# Patient Record
Sex: MALE | Race: WHITE | NOT HISPANIC OR LATINO
[De-identification: names, ages, dates, MRNs, and addresses within clinical notes are randomized per-mention and may not be internally consistent; named-entity substitution may affect disease eponyms.]

---

## 2017-10-30 ENCOUNTER — APPOINTMENT (OUTPATIENT)
Dept: UROLOGY | Facility: CLINIC | Age: 82
End: 2017-10-30

## 2017-11-29 ENCOUNTER — APPOINTMENT (OUTPATIENT)
Dept: ULTRASOUND IMAGING | Facility: IMAGING CENTER | Age: 82
End: 2017-11-29

## 2017-11-29 ENCOUNTER — APPOINTMENT (OUTPATIENT)
Dept: UROLOGY | Facility: CLINIC | Age: 82
End: 2017-11-29

## 2018-01-24 ENCOUNTER — APPOINTMENT (OUTPATIENT)
Dept: UROLOGY | Facility: CLINIC | Age: 83
End: 2018-01-24

## 2018-05-19 ENCOUNTER — INPATIENT (INPATIENT)
Facility: HOSPITAL | Age: 83
LOS: 15 days | Discharge: HOME | DRG: 239 | End: 2018-06-04
Attending: SURGERY | Admitting: SURGERY
Payer: MEDICARE

## 2018-05-19 ENCOUNTER — EMERGENCY (EMERGENCY)
Facility: HOSPITAL | Age: 83
LOS: 1 days | Discharge: ANOTHER HOSPITAL | End: 2018-05-19
Attending: EMERGENCY MEDICINE
Payer: MEDICARE

## 2018-05-19 VITALS
DIASTOLIC BLOOD PRESSURE: 103 MMHG | OXYGEN SATURATION: 99 % | SYSTOLIC BLOOD PRESSURE: 225 MMHG | RESPIRATION RATE: 19 BRPM | TEMPERATURE: 98 F | HEART RATE: 105 BPM

## 2018-05-19 VITALS
WEIGHT: 197.98 LBS | SYSTOLIC BLOOD PRESSURE: 145 MMHG | HEIGHT: 71 IN | OXYGEN SATURATION: 96 % | DIASTOLIC BLOOD PRESSURE: 64 MMHG | HEART RATE: 122 BPM | RESPIRATION RATE: 20 BRPM

## 2018-05-19 VITALS
SYSTOLIC BLOOD PRESSURE: 212 MMHG | OXYGEN SATURATION: 100 % | DIASTOLIC BLOOD PRESSURE: 102 MMHG | WEIGHT: 197.98 LBS | HEART RATE: 103 BPM

## 2018-05-19 DIAGNOSIS — Z98.89 OTHER SPECIFIED POSTPROCEDURAL STATES: Chronic | ICD-10-CM

## 2018-05-19 DIAGNOSIS — Z98.49 CATARACT EXTRACTION STATUS, UNSPECIFIED EYE: Chronic | ICD-10-CM

## 2018-05-19 LAB
ALBUMIN SERPL ELPH-MCNC: 2.3 G/DL — LOW (ref 3.3–5)
ALP SERPL-CCNC: 117 U/L — SIGNIFICANT CHANGE UP (ref 40–120)
ALT FLD-CCNC: 31 U/L — SIGNIFICANT CHANGE UP (ref 12–78)
ANION GAP SERPL CALC-SCNC: 13 MMOL/L — SIGNIFICANT CHANGE UP (ref 5–17)
APTT BLD: 39.3 SEC — HIGH (ref 27.5–37.4)
AST SERPL-CCNC: 22 U/L — SIGNIFICANT CHANGE UP (ref 15–37)
BASOPHILS # BLD AUTO: 0.2 K/UL — SIGNIFICANT CHANGE UP (ref 0–0.2)
BASOPHILS NFR BLD AUTO: 1.8 % — SIGNIFICANT CHANGE UP (ref 0–2)
BILIRUB SERPL-MCNC: 0.3 MG/DL — SIGNIFICANT CHANGE UP (ref 0.2–1.2)
BLD GP AB SCN SERPL QL: SIGNIFICANT CHANGE UP
BUN SERPL-MCNC: 11 MG/DL — SIGNIFICANT CHANGE UP (ref 7–23)
CALCIUM SERPL-MCNC: 5.9 MG/DL — CRITICAL LOW (ref 8.5–10.1)
CHLORIDE SERPL-SCNC: 120 MMOL/L — HIGH (ref 96–108)
CO2 SERPL-SCNC: 17 MMOL/L — LOW (ref 22–31)
CREAT SERPL-MCNC: 0.76 MG/DL — SIGNIFICANT CHANGE UP (ref 0.5–1.3)
EOSINOPHIL # BLD AUTO: 0.2 K/UL — SIGNIFICANT CHANGE UP (ref 0–0.5)
EOSINOPHIL NFR BLD AUTO: 2.4 % — SIGNIFICANT CHANGE UP (ref 0–6)
GLUCOSE SERPL-MCNC: 117 MG/DL — HIGH (ref 70–99)
HCT VFR BLD CALC: 47.2 % — SIGNIFICANT CHANGE UP (ref 39–50)
HGB BLD-MCNC: 15.8 G/DL — SIGNIFICANT CHANGE UP (ref 13–17)
INR BLD: 4.25 RATIO — HIGH (ref 0.88–1.16)
LACTATE SERPL-SCNC: 2.6 MMOL/L — HIGH (ref 0.7–2)
LYMPHOCYTES # BLD AUTO: 2.8 K/UL — SIGNIFICANT CHANGE UP (ref 1–3.3)
LYMPHOCYTES # BLD AUTO: 30.4 % — SIGNIFICANT CHANGE UP (ref 13–44)
MCHC RBC-ENTMCNC: 32.5 PG — SIGNIFICANT CHANGE UP (ref 27–34)
MCHC RBC-ENTMCNC: 33.5 GM/DL — SIGNIFICANT CHANGE UP (ref 32–36)
MCV RBC AUTO: 96.9 FL — SIGNIFICANT CHANGE UP (ref 80–100)
MONOCYTES # BLD AUTO: 0.6 K/UL — SIGNIFICANT CHANGE UP (ref 0–0.9)
MONOCYTES NFR BLD AUTO: 6.7 % — SIGNIFICANT CHANGE UP (ref 1–9)
NEUTROPHILS # BLD AUTO: 5.5 K/UL — SIGNIFICANT CHANGE UP (ref 1.8–7.4)
NEUTROPHILS NFR BLD AUTO: 58.8 % — SIGNIFICANT CHANGE UP (ref 43–77)
PLATELET # BLD AUTO: 165 K/UL — SIGNIFICANT CHANGE UP (ref 150–400)
POTASSIUM SERPL-MCNC: 2.5 MMOL/L — CRITICAL LOW (ref 3.5–5.3)
POTASSIUM SERPL-SCNC: 2.5 MMOL/L — CRITICAL LOW (ref 3.5–5.3)
PROT SERPL-MCNC: 4.8 G/DL — LOW (ref 6–8.3)
PROTHROM AB SERPL-ACNC: 47.7 SEC — HIGH (ref 9.8–12.7)
RBC # BLD: 4.87 M/UL — SIGNIFICANT CHANGE UP (ref 4.2–5.8)
RBC # FLD: 12.4 % — SIGNIFICANT CHANGE UP (ref 10.3–14.5)
SODIUM SERPL-SCNC: 150 MMOL/L — HIGH (ref 135–145)
WBC # BLD: 9.4 K/UL — SIGNIFICANT CHANGE UP (ref 3.8–10.5)
WBC # FLD AUTO: 9.4 K/UL — SIGNIFICANT CHANGE UP (ref 3.8–10.5)

## 2018-05-19 PROCEDURE — 99285 EMERGENCY DEPT VISIT HI MDM: CPT

## 2018-05-19 PROCEDURE — 71045 X-RAY EXAM CHEST 1 VIEW: CPT

## 2018-05-19 PROCEDURE — 96374 THER/PROPH/DIAG INJ IV PUSH: CPT

## 2018-05-19 PROCEDURE — 71045 X-RAY EXAM CHEST 1 VIEW: CPT | Mod: 26

## 2018-05-19 PROCEDURE — 83605 ASSAY OF LACTIC ACID: CPT

## 2018-05-19 PROCEDURE — 85610 PROTHROMBIN TIME: CPT

## 2018-05-19 PROCEDURE — 86850 RBC ANTIBODY SCREEN: CPT

## 2018-05-19 PROCEDURE — 85730 THROMBOPLASTIN TIME PARTIAL: CPT

## 2018-05-19 PROCEDURE — 80053 COMPREHEN METABOLIC PANEL: CPT

## 2018-05-19 PROCEDURE — 85027 COMPLETE CBC AUTOMATED: CPT

## 2018-05-19 PROCEDURE — 99285 EMERGENCY DEPT VISIT HI MDM: CPT | Mod: 25

## 2018-05-19 PROCEDURE — 96375 TX/PRO/DX INJ NEW DRUG ADDON: CPT

## 2018-05-19 PROCEDURE — 86901 BLOOD TYPING SEROLOGIC RH(D): CPT

## 2018-05-19 PROCEDURE — 86900 BLOOD TYPING SEROLOGIC ABO: CPT

## 2018-05-19 PROCEDURE — 93005 ELECTROCARDIOGRAM TRACING: CPT

## 2018-05-19 PROCEDURE — 99284 EMERGENCY DEPT VISIT MOD MDM: CPT

## 2018-05-19 RX ORDER — NICARDIPINE HYDROCHLORIDE 30 MG/1
1 CAPSULE, EXTENDED RELEASE ORAL
Qty: 40 | Refills: 0 | Status: DISCONTINUED | OUTPATIENT
Start: 2018-05-19 | End: 2018-05-23

## 2018-05-19 RX ORDER — MORPHINE SULFATE 50 MG/1
4 CAPSULE, EXTENDED RELEASE ORAL ONCE
Qty: 0 | Refills: 0 | Status: DISCONTINUED | OUTPATIENT
Start: 2018-05-19 | End: 2018-05-19

## 2018-05-19 RX ORDER — HYDROMORPHONE HYDROCHLORIDE 2 MG/ML
1 INJECTION INTRAMUSCULAR; INTRAVENOUS; SUBCUTANEOUS ONCE
Qty: 0 | Refills: 0 | Status: DISCONTINUED | OUTPATIENT
Start: 2018-05-19 | End: 2018-05-19

## 2018-05-19 RX ADMIN — NICARDIPINE HYDROCHLORIDE 5 MG/HR: 30 CAPSULE, EXTENDED RELEASE ORAL at 23:02

## 2018-05-19 RX ADMIN — HYDROMORPHONE HYDROCHLORIDE 1 MILLIGRAM(S): 2 INJECTION INTRAMUSCULAR; INTRAVENOUS; SUBCUTANEOUS at 22:55

## 2018-05-19 RX ADMIN — MORPHINE SULFATE 4 MILLIGRAM(S): 50 CAPSULE, EXTENDED RELEASE ORAL at 22:00

## 2018-05-19 RX ADMIN — MORPHINE SULFATE 4 MILLIGRAM(S): 50 CAPSULE, EXTENDED RELEASE ORAL at 22:45

## 2018-05-19 NOTE — ED PROVIDER NOTE - NEUROLOGICAL, MLM
Alert and oriented, no focal deficits, no motor or sensory deficits. Alert and oriented, no focal deficits; Decreased strength and sensation to LLE.

## 2018-05-19 NOTE — ED PROVIDER NOTE - MEDICAL DECISION MAKING DETAILS
pt with acute ischemic foot, hx of aneurysms, pain meds, vasc consult, check labs, bp control, transfer vasc [place] pt with acute ischemic foot, hx of aneurysms, pain meds, vasc consult, check labs, bp control, transfer vasc [place],  bp control

## 2018-05-19 NOTE — ED PROVIDER NOTE - OBJECTIVE STATEMENT
88M with h/o AAA , recent LLE DVT , started on Lovenox last Sunday, now on coumadin; brought in by family for acute onset of LLE pain since 10 PM tonight. Pt presented to [place] ED with cold, pulseless, pale LLE and transferred to [facility] ED for evaluation by vascular and general surgery.  No known history of Afib.  Pt denies any other acute complaints. 88M with h/o AAA , recent LLE DVT , started on Lovenox last Sunday, now on coumadin; brought in by family for acute onset of LLE pain since 10 PM tonight. Pt presented to [place] ED with cold, pulseless, pale LLE and transferred to [facility] ED for evaluation by vascular and general surgery.  No known history of Afib.  Pt denies any other acute complaints. Per [place] ED attending, Dr. Sandoval and Lisandra made aware. Pt arrived on cardene gtt for HTN and received morphine and dilaudid prior to transfer. 88M with h/o Alzheimers, HTN,  AAA , recent LLE DVT , started on Lovenox last Sunday, now on coumadin; brought in by family for acute onset of LLE pain since 10 PM tonight. Pt presented to [place] ED with cold, pulseless, pale LLE and transferred to [facility] ED for evaluation by vascular and general surgery.  No known history of Afib.  Pt denies any other acute complaints. Per [place] ED attending, Dr. Sandoval and Lisandra made aware. Pt arrived on cardene gtt for HTN and received morphine and dilaudid prior to transfer.    PMD: Dr. 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 Pt presented to [place] ED with cold, pulseless, pale LLE and transferred to [facility] ED for evaluation by vascular and general surgery.  No known history of Afib.  Pt denies any other acute complaints. Per [place] ED attending, Dr. Sandoval and Lisandra made aware. Pt arrived on cardene gtt for HTN and received morphine and dilaudid prior to transfer.    PMD: Dr. Rojas

## 2018-05-19 NOTE — ED PROVIDER NOTE - PHYSICAL EXAMINATION
Pale, cool LLE from knee down to foot  No dopplerable DP/ PT pulses.  Strong 2 + L femoral pulse Pale, cool LLE from knee down to foot  No dopplerable DP/ PT pulses.  Strong 2 + L femoral pulse  Decreased strength and sensation to LLE.

## 2018-05-19 NOTE — ED ADULT TRIAGE NOTE - CHIEF COMPLAINT QUOTE
pt a+ox3,  transfer from [place], c.o left lower leg pain. left lower leg pulseless, blue and cold to touch. pt reports numbness to left foot and lower leg. pt reports increase in pain to left lower leg. pt transferred on cardene infusion, infusion paused at this time by EMS, state stopped medication because of BP in route to hospital. pt rec'd morphine en route to ED. Accepting ED MD @ bedside for eval.

## 2018-05-19 NOTE — ED PROVIDER NOTE - OBJECTIVE STATEMENT
Pt is a 89 yo male with hx 5cm aaa, right femoral art aneurysm, recent left leg dvt startd on coumading. pt about 2 hoiurs ago with sudden left foot pain, numnbess and palor.  pt denies cp, back pain, sob, trauma.  per family no fall.  pt denies hx of afib

## 2018-05-19 NOTE — ED PROVIDER NOTE - PHYSICAL EXAMINATION
no c/e,    left foot with pallor over distal 2/3 with no dp or pt pulse, cold to touch, no cyanosis, motor weak, + numbness, pt with warmth and color normal in calf above ankle , +popliteal pulse b/l  right foot wnl, 2+ pulses    all other pulses wnl

## 2018-05-19 NOTE — ED PROVIDER NOTE - FAMILY HISTORY
Father  Still living? No  Family history of heart disease, Age at diagnosis: Age Unknown     Sibling  Still living? No  Family history of Alzheimer's disease, Age at diagnosis: Age Unknown

## 2018-05-19 NOTE — ED PROVIDER NOTE - SKIN, MLM
Skin normal color for race, warm, dry and intact. No evidence of rash. Pale LLE from slightly above knee and downward.

## 2018-05-19 NOTE — ED PROVIDER NOTE - MEDICAL DECISION MAKING DETAILS
Pt transferred from [place] ED with pulseless LLE; h/o popliteal aneurysm and new LLE DVT found 1 week ago - plan to repeat blood work; consult vascular and general surgery;

## 2018-05-19 NOTE — ED PROVIDER NOTE - MUSCULOSKELETAL, MLM
Spine appears normal, range of motion is not limited, no muscle or joint tenderness Spine appears normal, painful ROM of L knee

## 2018-05-19 NOTE — ED ADULT NURSE NOTE - OBJECTIVE STATEMENT
Pt. received alert/awake and confused w/ chief complaint of left lower extremity pain for 1 day. Pt. presents w/ blue left lower extremity, w/ numbness and pulseless. Pt. also states pain to posterior left knee. Pt. placed on continuous cardiac monitor with continuous pulse ox. EKG performed at bedside upon arrival.

## 2018-05-19 NOTE — ED PROVIDER NOTE - PMH
AAA (abdominal aortic aneurysm)  5cm  Right iliac aneurysm 4cm  Alzheimer disease    Cataract    Gallstones    Prostate ca

## 2018-05-19 NOTE — ED ADULT NURSE NOTE - PMH
AAA (abdominal aortic aneurysm)  5cm  Right iliac aneurysm 4cm  Alzheimer disease    Cataract    DVT (deep venous thrombosis)    Gallstones    Prostate ca

## 2018-05-19 NOTE — ED PROVIDER NOTE - PROGRESS NOTE DETAILS
dw [place] transfer center,  accepted by dr zelaya or emergency surg,  will tx for by but requests no other meds

## 2018-05-19 NOTE — ED PROVIDER NOTE - CONSTITUTIONAL, MLM
normal... chronically ill, well nourished, awake, alert, oriented to person, place, time/situation and in pain

## 2018-05-19 NOTE — ED PROVIDER NOTE - PROGRESS NOTE DETAILS
paging general surgery and vascular surgery. Awaiting call back. Vascular surgery PA now at bedside. Pt now at CT; radiology tech's accepting creatining from [facility] Pt now at CT; radiology tech's accepting creatinine from PV.  WIll hold heparin at this time per vascular attending recs.

## 2018-05-20 ENCOUNTER — TRANSCRIPTION ENCOUNTER (OUTPATIENT)
Age: 83
End: 2018-05-20

## 2018-05-20 DIAGNOSIS — I70.90 UNSPECIFIED ATHEROSCLEROSIS: ICD-10-CM

## 2018-05-20 DIAGNOSIS — Z01.810 ENCOUNTER FOR PREPROCEDURAL CARDIOVASCULAR EXAMINATION: ICD-10-CM

## 2018-05-20 DIAGNOSIS — I82.502 CHRONIC EMBOLISM AND THROMBOSIS OF UNSPECIFIED DEEP VEINS OF LEFT LOWER EXTREMITY: ICD-10-CM

## 2018-05-20 DIAGNOSIS — Z98.49 CATARACT EXTRACTION STATUS, UNSPECIFIED EYE: Chronic | ICD-10-CM

## 2018-05-20 DIAGNOSIS — Z90.79 ACQUIRED ABSENCE OF OTHER GENITAL ORGAN(S): Chronic | ICD-10-CM

## 2018-05-20 DIAGNOSIS — Z90.49 ACQUIRED ABSENCE OF OTHER SPECIFIED PARTS OF DIGESTIVE TRACT: Chronic | ICD-10-CM

## 2018-05-20 DIAGNOSIS — I99.8 OTHER DISORDER OF CIRCULATORY SYSTEM: ICD-10-CM

## 2018-05-20 LAB
ALBUMIN SERPL ELPH-MCNC: 3.7 G/DL — SIGNIFICANT CHANGE UP (ref 3.3–5.2)
ALP SERPL-CCNC: 164 U/L — HIGH (ref 40–120)
ALT FLD-CCNC: 39 U/L — SIGNIFICANT CHANGE UP
ANION GAP SERPL CALC-SCNC: 16 MMOL/L — SIGNIFICANT CHANGE UP (ref 5–17)
ANION GAP SERPL CALC-SCNC: 16 MMOL/L — SIGNIFICANT CHANGE UP (ref 5–17)
ANION GAP SERPL CALC-SCNC: 19 MMOL/L — HIGH (ref 5–17)
APTT BLD: 105.4 SEC — HIGH (ref 27.5–37.4)
APTT BLD: 49.6 SEC — HIGH (ref 27.5–37.4)
APTT BLD: 93.7 SEC — HIGH (ref 27.5–37.4)
APTT BLD: 95.6 SEC — HIGH (ref 27.5–37.4)
APTT BLD: >200 SEC — CRITICAL HIGH (ref 27.5–37.4)
AST SERPL-CCNC: 34 U/L — SIGNIFICANT CHANGE UP
BASE EXCESS BLDV CALC-SCNC: -4.1 MMOL/L — LOW (ref -2–2)
BASOPHILS # BLD AUTO: 0 K/UL — SIGNIFICANT CHANGE UP (ref 0–0.2)
BASOPHILS # BLD AUTO: 0 K/UL — SIGNIFICANT CHANGE UP (ref 0–0.2)
BASOPHILS NFR BLD AUTO: 0.2 % — SIGNIFICANT CHANGE UP (ref 0–2)
BASOPHILS NFR BLD AUTO: 0.3 % — SIGNIFICANT CHANGE UP (ref 0–2)
BILIRUB SERPL-MCNC: 0.8 MG/DL — SIGNIFICANT CHANGE UP (ref 0.4–2)
BLD GP AB SCN SERPL QL: SIGNIFICANT CHANGE UP
BUN SERPL-MCNC: 13 MG/DL — SIGNIFICANT CHANGE UP (ref 8–20)
BUN SERPL-MCNC: 13 MG/DL — SIGNIFICANT CHANGE UP (ref 8–20)
BUN SERPL-MCNC: 15 MG/DL — SIGNIFICANT CHANGE UP (ref 8–20)
CA-I SERPL-SCNC: 1.08 MMOL/L — LOW (ref 1.15–1.33)
CALCIUM SERPL-MCNC: 8.4 MG/DL — LOW (ref 8.6–10.2)
CALCIUM SERPL-MCNC: 8.5 MG/DL — LOW (ref 8.6–10.2)
CALCIUM SERPL-MCNC: 9 MG/DL — SIGNIFICANT CHANGE UP (ref 8.6–10.2)
CHLORIDE BLDV-SCNC: 111 MMOL/L — HIGH (ref 98–107)
CHLORIDE SERPL-SCNC: 101 MMOL/L — SIGNIFICANT CHANGE UP (ref 98–107)
CHLORIDE SERPL-SCNC: 103 MMOL/L — SIGNIFICANT CHANGE UP (ref 98–107)
CHLORIDE SERPL-SCNC: 104 MMOL/L — SIGNIFICANT CHANGE UP (ref 98–107)
CO2 SERPL-SCNC: 19 MMOL/L — LOW (ref 22–29)
CO2 SERPL-SCNC: 23 MMOL/L — SIGNIFICANT CHANGE UP (ref 22–29)
CO2 SERPL-SCNC: 23 MMOL/L — SIGNIFICANT CHANGE UP (ref 22–29)
CREAT SERPL-MCNC: 0.89 MG/DL — SIGNIFICANT CHANGE UP (ref 0.5–1.3)
CREAT SERPL-MCNC: 0.98 MG/DL — SIGNIFICANT CHANGE UP (ref 0.5–1.3)
CREAT SERPL-MCNC: 1.16 MG/DL — SIGNIFICANT CHANGE UP (ref 0.5–1.3)
EOSINOPHIL # BLD AUTO: 0 K/UL — SIGNIFICANT CHANGE UP (ref 0–0.5)
EOSINOPHIL # BLD AUTO: 0 K/UL — SIGNIFICANT CHANGE UP (ref 0–0.5)
EOSINOPHIL NFR BLD AUTO: 0.1 % — SIGNIFICANT CHANGE UP (ref 0–5)
EOSINOPHIL NFR BLD AUTO: 0.2 % — SIGNIFICANT CHANGE UP (ref 0–5)
GAS PNL BLDA: SIGNIFICANT CHANGE UP
GAS PNL BLDV: 145 MMOL/L — SIGNIFICANT CHANGE UP (ref 135–145)
GAS PNL BLDV: SIGNIFICANT CHANGE UP
GAS PNL BLDV: SIGNIFICANT CHANGE UP
GLUCOSE BLDC GLUCOMTR-MCNC: 114 MG/DL — HIGH (ref 70–99)
GLUCOSE BLDC GLUCOMTR-MCNC: 131 MG/DL — HIGH (ref 70–99)
GLUCOSE BLDC GLUCOMTR-MCNC: 137 MG/DL — HIGH (ref 70–99)
GLUCOSE BLDV-MCNC: 211 MG/DL — HIGH (ref 70–99)
GLUCOSE SERPL-MCNC: 145 MG/DL — HIGH (ref 70–115)
GLUCOSE SERPL-MCNC: 156 MG/DL — HIGH (ref 70–115)
GLUCOSE SERPL-MCNC: 219 MG/DL — HIGH (ref 70–115)
HCO3 BLDV-SCNC: 21 MMOL/L — SIGNIFICANT CHANGE UP (ref 21–29)
HCT VFR BLD CALC: 47.6 % — SIGNIFICANT CHANGE UP (ref 42–52)
HCT VFR BLD CALC: 48.9 % — SIGNIFICANT CHANGE UP (ref 42–52)
HGB BLD-MCNC: 15.9 G/DL — SIGNIFICANT CHANGE UP (ref 14–18)
HGB BLD-MCNC: 16.5 G/DL — SIGNIFICANT CHANGE UP (ref 14–18)
INR BLD: 1.51 RATIO — HIGH (ref 0.88–1.16)
INR BLD: 4.71 RATIO — HIGH (ref 0.88–1.16)
LACTATE BLDV-MCNC: 4.2 MMOL/L — CRITICAL HIGH (ref 0.5–2)
LACTATE BLDV-MCNC: 4.2 MMOL/L — CRITICAL HIGH (ref 0.5–2)
LYMPHOCYTES # BLD AUTO: 1 K/UL — SIGNIFICANT CHANGE UP (ref 1–4.8)
LYMPHOCYTES # BLD AUTO: 1.2 K/UL — SIGNIFICANT CHANGE UP (ref 1–4.8)
LYMPHOCYTES # BLD AUTO: 12.5 % — LOW (ref 20–55)
LYMPHOCYTES # BLD AUTO: 6.7 % — LOW (ref 20–55)
MAGNESIUM SERPL-MCNC: 2.6 MG/DL — SIGNIFICANT CHANGE UP (ref 1.6–2.6)
MCHC RBC-ENTMCNC: 32 PG — HIGH (ref 27–31)
MCHC RBC-ENTMCNC: 32.9 PG — HIGH (ref 27–31)
MCHC RBC-ENTMCNC: 33.4 G/DL — SIGNIFICANT CHANGE UP (ref 32–36)
MCHC RBC-ENTMCNC: 33.7 G/DL — SIGNIFICANT CHANGE UP (ref 32–36)
MCV RBC AUTO: 95.8 FL — HIGH (ref 80–94)
MCV RBC AUTO: 97.6 FL — HIGH (ref 80–94)
MONOCYTES # BLD AUTO: 1.1 K/UL — HIGH (ref 0–0.8)
MONOCYTES # BLD AUTO: 1.2 K/UL — HIGH (ref 0–0.8)
MONOCYTES NFR BLD AUTO: 12.7 % — HIGH (ref 3–10)
MONOCYTES NFR BLD AUTO: 7.5 % — SIGNIFICANT CHANGE UP (ref 3–10)
NEUTROPHILS # BLD AUTO: 13 K/UL — HIGH (ref 1.8–8)
NEUTROPHILS # BLD AUTO: 6.8 K/UL — SIGNIFICANT CHANGE UP (ref 1.8–8)
NEUTROPHILS NFR BLD AUTO: 74.2 % — HIGH (ref 37–73)
NEUTROPHILS NFR BLD AUTO: 85.1 % — HIGH (ref 37–73)
PCO2 BLDV: 36 MMHG — SIGNIFICANT CHANGE UP (ref 35–50)
PH BLDV: 7.37 — SIGNIFICANT CHANGE UP (ref 7.32–7.43)
PHOSPHATE SERPL-MCNC: 4.6 MG/DL — SIGNIFICANT CHANGE UP (ref 2.4–4.7)
PLATELET # BLD AUTO: 143 K/UL — LOW (ref 150–400)
PLATELET # BLD AUTO: 158 K/UL — SIGNIFICANT CHANGE UP (ref 150–400)
PO2 BLDV: 199 MMHG — HIGH (ref 25–45)
POTASSIUM BLDV-SCNC: 3.5 MMOL/L — SIGNIFICANT CHANGE UP (ref 3.4–4.5)
POTASSIUM SERPL-MCNC: 3.6 MMOL/L — SIGNIFICANT CHANGE UP (ref 3.5–5.3)
POTASSIUM SERPL-MCNC: 4.6 MMOL/L — SIGNIFICANT CHANGE UP (ref 3.5–5.3)
POTASSIUM SERPL-MCNC: 5.7 MMOL/L — HIGH (ref 3.5–5.3)
POTASSIUM SERPL-SCNC: 3.6 MMOL/L — SIGNIFICANT CHANGE UP (ref 3.5–5.3)
POTASSIUM SERPL-SCNC: 4.6 MMOL/L — SIGNIFICANT CHANGE UP (ref 3.5–5.3)
POTASSIUM SERPL-SCNC: 5.7 MMOL/L — HIGH (ref 3.5–5.3)
PROT SERPL-MCNC: 7.3 G/DL — SIGNIFICANT CHANGE UP (ref 6.6–8.7)
PROTHROM AB SERPL-ACNC: 16.8 SEC — HIGH (ref 9.8–12.7)
PROTHROM AB SERPL-ACNC: 53.5 SEC — HIGH (ref 9.8–12.7)
RBC # BLD: 4.97 M/UL — SIGNIFICANT CHANGE UP (ref 4.6–6.2)
RBC # BLD: 5.01 M/UL — SIGNIFICANT CHANGE UP (ref 4.6–6.2)
RBC # FLD: 13.6 % — SIGNIFICANT CHANGE UP (ref 11–15.6)
RBC # FLD: 13.8 % — SIGNIFICANT CHANGE UP (ref 11–15.6)
SAO2 % BLDV: 100 % — SIGNIFICANT CHANGE UP
SODIUM SERPL-SCNC: 140 MMOL/L — SIGNIFICANT CHANGE UP (ref 135–145)
SODIUM SERPL-SCNC: 142 MMOL/L — SIGNIFICANT CHANGE UP (ref 135–145)
SODIUM SERPL-SCNC: 142 MMOL/L — SIGNIFICANT CHANGE UP (ref 135–145)
TSH SERPL-MCNC: 2 UIU/ML — SIGNIFICANT CHANGE UP (ref 0.27–4.2)
TSH SERPL-MCNC: 4.35 UIU/ML — HIGH (ref 0.27–4.2)
TYPE + AB SCN PNL BLD: SIGNIFICANT CHANGE UP
WBC # BLD: 15.3 K/UL — HIGH (ref 4.8–10.8)
WBC # BLD: 9.2 K/UL — SIGNIFICANT CHANGE UP (ref 4.8–10.8)
WBC # FLD AUTO: 15.3 K/UL — HIGH (ref 4.8–10.8)
WBC # FLD AUTO: 9.2 K/UL — SIGNIFICANT CHANGE UP (ref 4.8–10.8)

## 2018-05-20 PROCEDURE — 71045 X-RAY EXAM CHEST 1 VIEW: CPT | Mod: 26

## 2018-05-20 PROCEDURE — 75635 CT ANGIO ABDOMINAL ARTERIES: CPT | Mod: 26

## 2018-05-20 PROCEDURE — 99223 1ST HOSP IP/OBS HIGH 75: CPT

## 2018-05-20 PROCEDURE — 93010 ELECTROCARDIOGRAM REPORT: CPT

## 2018-05-20 RX ORDER — CALCIUM GLUCONATE 100 MG/ML
2 VIAL (ML) INTRAVENOUS ONCE
Qty: 0 | Refills: 0 | Status: COMPLETED | OUTPATIENT
Start: 2018-05-20 | End: 2018-05-20

## 2018-05-20 RX ORDER — HYDROMORPHONE HYDROCHLORIDE 2 MG/ML
1 INJECTION INTRAMUSCULAR; INTRAVENOUS; SUBCUTANEOUS
Qty: 0 | Refills: 0 | Status: DISCONTINUED | OUTPATIENT
Start: 2018-05-20 | End: 2018-05-21

## 2018-05-20 RX ORDER — SODIUM CHLORIDE 9 MG/ML
1000 INJECTION, SOLUTION INTRAVENOUS
Qty: 0 | Refills: 0 | Status: DISCONTINUED | OUTPATIENT
Start: 2018-05-20 | End: 2018-05-20

## 2018-05-20 RX ORDER — HEPARIN SODIUM 5000 [USP'U]/ML
3500 INJECTION INTRAVENOUS; SUBCUTANEOUS EVERY 6 HOURS
Qty: 0 | Refills: 0 | Status: DISCONTINUED | OUTPATIENT
Start: 2018-05-20 | End: 2018-05-20

## 2018-05-20 RX ORDER — CEFAZOLIN SODIUM 1 G
2000 VIAL (EA) INJECTION ONCE
Qty: 0 | Refills: 0 | Status: DISCONTINUED | OUTPATIENT
Start: 2018-05-20 | End: 2018-05-20

## 2018-05-20 RX ORDER — PHYTONADIONE (VIT K1) 5 MG
10 TABLET ORAL ONCE
Qty: 0 | Refills: 0 | Status: COMPLETED | OUTPATIENT
Start: 2018-05-20 | End: 2018-05-20

## 2018-05-20 RX ORDER — ACETAMINOPHEN 500 MG
1000 TABLET ORAL ONCE
Qty: 0 | Refills: 0 | Status: COMPLETED | OUTPATIENT
Start: 2018-05-20 | End: 2018-05-20

## 2018-05-20 RX ORDER — HYDROMORPHONE HYDROCHLORIDE 2 MG/ML
0.5 INJECTION INTRAMUSCULAR; INTRAVENOUS; SUBCUTANEOUS ONCE
Qty: 0 | Refills: 0 | Status: DISCONTINUED | OUTPATIENT
Start: 2018-05-20 | End: 2018-05-20

## 2018-05-20 RX ORDER — MAGNESIUM SULFATE 500 MG/ML
2 VIAL (ML) INJECTION ONCE
Qty: 0 | Refills: 0 | Status: COMPLETED | OUTPATIENT
Start: 2018-05-20 | End: 2018-05-20

## 2018-05-20 RX ORDER — ASPIRIN/CALCIUM CARB/MAGNESIUM 324 MG
81 TABLET ORAL DAILY
Qty: 0 | Refills: 0 | Status: DISCONTINUED | OUTPATIENT
Start: 2018-05-20 | End: 2018-05-21

## 2018-05-20 RX ORDER — POTASSIUM PHOSPHATE, MONOBASIC POTASSIUM PHOSPHATE, DIBASIC 236; 224 MG/ML; MG/ML
30 INJECTION, SOLUTION INTRAVENOUS ONCE
Qty: 0 | Refills: 0 | Status: COMPLETED | OUTPATIENT
Start: 2018-05-20 | End: 2018-05-20

## 2018-05-20 RX ORDER — SODIUM CHLORIDE 9 MG/ML
1000 INJECTION INTRAMUSCULAR; INTRAVENOUS; SUBCUTANEOUS ONCE
Qty: 0 | Refills: 0 | Status: COMPLETED | OUTPATIENT
Start: 2018-05-20 | End: 2018-05-20

## 2018-05-20 RX ORDER — POTASSIUM CHLORIDE 20 MEQ
10 PACKET (EA) ORAL ONCE
Qty: 0 | Refills: 0 | Status: COMPLETED | OUTPATIENT
Start: 2018-05-20 | End: 2018-05-20

## 2018-05-20 RX ORDER — PANTOPRAZOLE SODIUM 20 MG/1
40 TABLET, DELAYED RELEASE ORAL
Qty: 0 | Refills: 0 | Status: DISCONTINUED | OUTPATIENT
Start: 2018-05-20 | End: 2018-05-21

## 2018-05-20 RX ORDER — DONEPEZIL HYDROCHLORIDE 10 MG/1
10 TABLET, FILM COATED ORAL AT BEDTIME
Qty: 0 | Refills: 0 | Status: DISCONTINUED | OUTPATIENT
Start: 2018-05-20 | End: 2018-05-21

## 2018-05-20 RX ORDER — LISINOPRIL 2.5 MG/1
5 TABLET ORAL DAILY
Qty: 0 | Refills: 0 | Status: DISCONTINUED | OUTPATIENT
Start: 2018-05-20 | End: 2018-05-21

## 2018-05-20 RX ORDER — ATORVASTATIN CALCIUM 80 MG/1
10 TABLET, FILM COATED ORAL AT BEDTIME
Qty: 0 | Refills: 0 | Status: DISCONTINUED | OUTPATIENT
Start: 2018-05-20 | End: 2018-05-21

## 2018-05-20 RX ORDER — HEPARIN SODIUM 5000 [USP'U]/ML
1200 INJECTION INTRAVENOUS; SUBCUTANEOUS
Qty: 25000 | Refills: 0 | Status: DISCONTINUED | OUTPATIENT
Start: 2018-05-20 | End: 2018-05-21

## 2018-05-20 RX ORDER — HEPARIN SODIUM 5000 [USP'U]/ML
7000 INJECTION INTRAVENOUS; SUBCUTANEOUS EVERY 6 HOURS
Qty: 0 | Refills: 0 | Status: DISCONTINUED | OUTPATIENT
Start: 2018-05-20 | End: 2018-05-20

## 2018-05-20 RX ORDER — GABAPENTIN 400 MG/1
300 CAPSULE ORAL THREE TIMES A DAY
Qty: 0 | Refills: 0 | Status: DISCONTINUED | OUTPATIENT
Start: 2018-05-20 | End: 2018-05-21

## 2018-05-20 RX ORDER — HEPARIN SODIUM 5000 [USP'U]/ML
INJECTION INTRAVENOUS; SUBCUTANEOUS
Qty: 25000 | Refills: 0 | Status: DISCONTINUED | OUTPATIENT
Start: 2018-05-20 | End: 2018-05-20

## 2018-05-20 RX ORDER — CEFAZOLIN SODIUM 1 G
2000 VIAL (EA) INJECTION ONCE
Qty: 0 | Refills: 0 | Status: DISCONTINUED | OUTPATIENT
Start: 2018-05-21 | End: 2018-05-21

## 2018-05-20 RX ORDER — HYDROMORPHONE HYDROCHLORIDE 2 MG/ML
0.5 INJECTION INTRAMUSCULAR; INTRAVENOUS; SUBCUTANEOUS
Qty: 0 | Refills: 0 | Status: DISCONTINUED | OUTPATIENT
Start: 2018-05-20 | End: 2018-05-21

## 2018-05-20 RX ADMIN — Medication 50 GRAM(S): at 04:35

## 2018-05-20 RX ADMIN — DONEPEZIL HYDROCHLORIDE 10 MILLIGRAM(S): 10 TABLET, FILM COATED ORAL at 21:24

## 2018-05-20 RX ADMIN — HEPARIN SODIUM 1000 UNIT(S)/HR: 5000 INJECTION INTRAVENOUS; SUBCUTANEOUS at 23:46

## 2018-05-20 RX ADMIN — GABAPENTIN 300 MILLIGRAM(S): 400 CAPSULE ORAL at 05:40

## 2018-05-20 RX ADMIN — PANTOPRAZOLE SODIUM 40 MILLIGRAM(S): 20 TABLET, DELAYED RELEASE ORAL at 06:19

## 2018-05-20 RX ADMIN — HEPARIN SODIUM 1000 UNIT(S)/HR: 5000 INJECTION INTRAVENOUS; SUBCUTANEOUS at 17:26

## 2018-05-20 RX ADMIN — Medication 200 GRAM(S): at 14:56

## 2018-05-20 RX ADMIN — GABAPENTIN 300 MILLIGRAM(S): 400 CAPSULE ORAL at 21:24

## 2018-05-20 RX ADMIN — POTASSIUM PHOSPHATE, MONOBASIC POTASSIUM PHOSPHATE, DIBASIC 83.33 MILLIMOLE(S): 236; 224 INJECTION, SOLUTION INTRAVENOUS at 09:30

## 2018-05-20 RX ADMIN — Medication 1000 MILLIGRAM(S): at 04:00

## 2018-05-20 RX ADMIN — GABAPENTIN 300 MILLIGRAM(S): 400 CAPSULE ORAL at 14:58

## 2018-05-20 RX ADMIN — HEPARIN SODIUM 1200 UNIT(S)/HR: 5000 INJECTION INTRAVENOUS; SUBCUTANEOUS at 05:03

## 2018-05-20 RX ADMIN — SODIUM CHLORIDE 1000 MILLILITER(S): 9 INJECTION INTRAMUSCULAR; INTRAVENOUS; SUBCUTANEOUS at 01:50

## 2018-05-20 RX ADMIN — HYDROMORPHONE HYDROCHLORIDE 0.5 MILLIGRAM(S): 2 INJECTION INTRAMUSCULAR; INTRAVENOUS; SUBCUTANEOUS at 03:57

## 2018-05-20 RX ADMIN — ATORVASTATIN CALCIUM 10 MILLIGRAM(S): 80 TABLET, FILM COATED ORAL at 21:24

## 2018-05-20 RX ADMIN — Medication 102 MILLIGRAM(S): at 02:02

## 2018-05-20 RX ADMIN — Medication 400 MILLIGRAM(S): at 03:57

## 2018-05-20 RX ADMIN — HYDROMORPHONE HYDROCHLORIDE 0.5 MILLIGRAM(S): 2 INJECTION INTRAMUSCULAR; INTRAVENOUS; SUBCUTANEOUS at 04:55

## 2018-05-20 RX ADMIN — Medication 81 MILLIGRAM(S): at 12:14

## 2018-05-20 RX ADMIN — HYDROMORPHONE HYDROCHLORIDE 0.5 MILLIGRAM(S): 2 INJECTION INTRAMUSCULAR; INTRAVENOUS; SUBCUTANEOUS at 02:02

## 2018-05-20 RX ADMIN — SODIUM CHLORIDE 100 MILLILITER(S): 9 INJECTION, SOLUTION INTRAVENOUS at 03:55

## 2018-05-20 RX ADMIN — LISINOPRIL 5 MILLIGRAM(S): 2.5 TABLET ORAL at 06:19

## 2018-05-20 RX ADMIN — Medication 100 MILLIEQUIVALENT(S): at 10:04

## 2018-05-20 RX ADMIN — HYDROMORPHONE HYDROCHLORIDE 0.5 MILLIGRAM(S): 2 INJECTION INTRAMUSCULAR; INTRAVENOUS; SUBCUTANEOUS at 04:35

## 2018-05-20 RX ADMIN — POTASSIUM PHOSPHATE, MONOBASIC POTASSIUM PHOSPHATE, DIBASIC 83.33 MILLIMOLE(S): 236; 224 INJECTION, SOLUTION INTRAVENOUS at 04:43

## 2018-05-20 NOTE — H&P ADULT - NSHPPHYSICALEXAM_GEN_ALL_CORE
VSS, NAD, A&O   CVS: S1, S2  Chest: BS BL  Abd: soft, NT, ecchymotic   LLE: pale, insensate, without motor function, cool to touch from foot to knee extending into thigh, palpable popliteal fullness, pulses non-palpable throughout exremity except for 2+ femoral pulse  RLE: NVI , warm

## 2018-05-20 NOTE — PROGRESS NOTE ADULT - SUBJECTIVE AND OBJECTIVE BOX
PRE OPERATIVE NOTE    Pre-op Diagnosis: Critical limb ischemia  Procedure: Left AKA  Surgeon: Dr Fry    PAST MEDICAL & SURGICAL HISTORY:  DVT (deep venous thrombosis)  Cataract of both eyes  Prostate CA  AAA (abdominal aortic aneurysm)  Alzheimer disease  Hypertension  DVT (deep venous thrombosis)  AAA (abdominal aortic aneurysm): 5cm  Right iliac aneurysm 4cm  Alzheimer disease  Cataract  Prostate ca  Gallstones  History of cataract surgery  H/O prostatectomy  History of cholecystectomy  H/O prostatectomy:   Status post cataract surgery: bilateral  History of cholecystectomy:     Allergies  No Known Allergies    Daily Height in cm: 180.34 (19 May 2018 23:36)    Daily Weight in k.3 (20 May 2018 03:30)    Vital Signs Last 24 Hrs  T(C): 36.4 (20 May 2018 15:59), Max: 37.2 (20 May 2018 05:00)  T(F): 97.5 (20 May 2018 15:59), Max: 99 (20 May 2018 05:00)  HR: 86 (20 May 2018 16:00) (79 - 124)  BP: 171/81 (20 May 2018 16:00) (145/64 - 225/103)  BP(mean): 117 (20 May 2018 16:00) (100 - 130)  RR: 15 (20 May 2018 16:00) (11 - 20)  SpO2: 97% (20 May 2018 16:00) (95% - 100%)    MEDICATIONS  (STANDING):  aspirin  chewable 81 milliGRAM(s) Oral daily  atorvastatin 10 milliGRAM(s) Oral at bedtime  donepezil 10 milliGRAM(s) Oral at bedtime  gabapentin 300 milliGRAM(s) Oral three times a day  heparin  Infusion. 1200 Unit(s)/Hr (12 mL/Hr) IV Continuous <Continuous>  lisinopril 5 milliGRAM(s) Oral daily  pantoprazole    Tablet 40 milliGRAM(s) Oral before breakfast    MEDICATIONS  (PRN):  HYDROmorphone  Injectable 0.5 milliGRAM(s) IV Push every 3 hours PRN Moderate Pain (4 - 6)  HYDROmorphone  Injectable 1 milliGRAM(s) IV Push every 3 hours PRN Severe Pain (7 - 10)                            15.9   9.2   )-----------( 143      ( 20 May 2018 09:44 )             47.6     05-20    142  |  103  |  13.0  ----------------------------<  156<H>  4.6   |  23.0  |  0.98    Ca    8.5<L>      20 May 2018 15:47  Phos  4.6       Mg     2.6         TPro  7.3  /  Alb  3.7  /  TBili  0.8  /  DBili  x   /  AST  34  /  ALT  39  /  AlkPhos  164<H>      PT/INR - ( 20 May 2018 09:44 )   PT: 16.8 sec;   INR: 1.51 ratio    PTT - ( 20 May 2018 15:47 )  PTT:>200.0 sec    CAPILLARY BLOOD GLUCOSE  POCT Blood Glucose.: 114 mg/dL (20 May 2018 11:51)    Type & Screen: on chart  CXR: on chart  EKG: on chart    A/P: 88y Male planned for above procedure  Consent to be obtained by MD in periop holding area  NPO past midnight, except medications  lisinopril  Hold heparin on call to OR  Blood on hold, 4 Units  Periop antibiotics ordered

## 2018-05-20 NOTE — ED ADULT NURSE NOTE - OBJECTIVE STATEMENT
Pt A&Ox4 c/o LLE pain and numbness at this time, was a transfer from [place] for Surgery consult. LLE is dusky in color cool to touch, absent pulse and sentation. Pt resting comfortably, VSS, no signs of distress at this time, CM in place, safety maintained, call bell in reach.

## 2018-05-20 NOTE — H&P ADULT - PMH
AAA (abdominal aortic aneurysm)    Alzheimer disease    Cataract of both eyes    DVT (deep venous thrombosis)    Hypertension    Prostate CA

## 2018-05-20 NOTE — H&P ADULT - HISTORY OF PRESENT ILLNESS
88 year old male w/PMH of Alzheimers, prostate ca s/p prostatectomy transferred here from [place] with pulselessness, pain and numbness to E that developed around 9pm . Patients family reports a recent diagnosis of DVT to [facility] and started Lovenox / coumadin therapy about 10 days ago with a recent INR of 2.3 a few days ago, however INR - 4.2 on repeat labs here . Lactate uptrending from 2.6 to 4.7 . Pt comes in on cardene gtt for SBP > 200 .     Pts family report no history of vascular intervention .   Pt complains of pain despite medication, loss of sensation and ability to move leg.  Pt normally ambulates independently at home.

## 2018-05-20 NOTE — ED ADULT NURSE NOTE - CHIEF COMPLAINT QUOTE
pt a+ox3,  transfer from [place], c.o left lower leg pain. left lower leg pulseless, blue and cold to touch. pt reports numbness to left foot and lower leg. pt reports increase in pain to left lower leg. pt transferred on cardene infusion, infusion paused at this time by EMS, state stopped medication because of BP in route to hospital. pt rec'd morphine en route to ED. Accepting ED MD @ bedside for eval.

## 2018-05-20 NOTE — CONSULT NOTE ADULT - SUBJECTIVE AND OBJECTIVE BOX
[facility]                                                        Office: [address] [address]                                                       Telephone: [phone]. Fax:[phone]      CC: leg pain    HPI: 88 year old male w/PMH of Alzheimers, prostate ca s/p prostatectomy transferred here from [place] with pulselessness, pain and numbness to [facility] that developed around 9pm . Patients family reports a recent diagnosis of DVT to [facility] and started Lovenox / coumadin therapy about 10 days ago with a recent INR of 2.3 a few days ago, however INR - 4.2 on repeat labs here . Lactate uptrending from 2.6 to 4.7 . Pt comes in on cardene gtt for SBP > 200 .     Unclear cardiac history. Patient unable to provide any specifics. No chest pain at this time.     PAST MEDICAL & SURGICAL HISTORY:  DVT (deep venous thrombosis)  Cataract of both eyes  Prostate CA  AAA (abdominal aortic aneurysm)  Alzheimer disease  Hypertension  DVT (deep venous thrombosis)  AAA (abdominal aortic aneurysm): 5cm  Right iliac aneurysm 4cm  Alzheimer disease  Cataract  Prostate ca  Gallstones  History of cataract surgery  H/O prostatectomy  History of cholecystectomy  H/O prostatectomy: 1998  Status post cataract surgery: bilateral  History of cholecystectomy: 1997    FAMILY HISTORY:  No pertinent family history in first degree relatives  Family history of Alzheimer's disease (Sibling)  Family history of heart disease (Father)    SOCIAL HISTORY: no EtOH, drugs or tobacco    MEDICATIONS  (STANDING):  aspirin  chewable 81 milliGRAM(s) Oral daily  atorvastatin 10 milliGRAM(s) Oral at bedtime  calcium gluconate IVPB 2 Gram(s) IV Intermittent once  donepezil 10 milliGRAM(s) Oral at bedtime  gabapentin 300 milliGRAM(s) Oral three times a day  heparin  Infusion. 1200 Unit(s)/Hr (12 mL/Hr) IV Continuous <Continuous>  lisinopril 5 milliGRAM(s) Oral daily  multiple electrolytes Injection Type 1 1000 milliLiter(s) (100 mL/Hr) IV Continuous <Continuous>  pantoprazole    Tablet 40 milliGRAM(s) Oral before breakfast    ROS: All others negative    PHYSICAL EXAM:  Vital Signs Last 24 Hrs  T(C): 37.2 (20 May 2018 12:10), Max: 37.2 (20 May 2018 05:00)  T(F): 98.9 (20 May 2018 12:10), Max: 99 (20 May 2018 05:00)  HR: 87 (20 May 2018 10:00) (87 - 124)  BP: 151/70 (20 May 2018 10:00) (145/64 - 225/103)  BP(mean): 100 (20 May 2018 10:00) (100 - 130)  RR: 19 (20 May 2018 10:00) (11 - 20)  SpO2: 95% (20 May 2018 10:00) (95% - 100%)  I&O's Summary    19 May 2018 07:01  -  20 May 2018 07:00  --------------------------------------------------------  IN: 1394.6 mL / OUT: 0 mL / NET: 1394.6 mL    20 May 2018 07:01  -  20 May 2018 12:33  --------------------------------------------------------  IN: 456 mL / OUT: 825 mL / NET: -369 mL      Appearance: Normal	  HEENT:   Normal oral mucosa, PERRL, EOMI	  Lymphatic: No lymphadenopathy  Cardiovascular: Normal S1 S2, No JVD, No murmurs, No edema  Respiratory: Lungs clear to auscultation	  Psychiatry: A & O x 3, Mood & affect appropriate  Gastrointestinal:  Soft, Non-tender, + BS	  Skin: No rashes, No ecchymoses, No cyanosis  Neurologic: unable to move left lower extremity.   Extremities: Left lower extremity is cold with no pulse.    ECG: Sinus with PACs and PVCs.   LABS:                        15.9   9.2   )-----------( 143      ( 20 May 2018 09:44 )             47.6     05-20    140  |  101  |  13.0  ----------------------------<  145<H>  5.7<H>   |  23.0  |  0.89    Ca    8.4<L>      20 May 2018 09:44  Phos  4.6     05-20  Mg     2.6     05-20    TPro  7.3  /  Alb  3.7  /  TBili  0.8  /  DBili  x   /  AST  34  /  ALT  39  /  AlkPhos  164<H>  05-20    PT/INR - ( 20 May 2018 09:44 )   PT: 16.8 sec;   INR: 1.51 ratio         PTT - ( 20 May 2018 09:44 )  PTT:93.7 sec  CARDIAC MARKERS ( 20 May 2018 00:20 )  x     / x     / 209 U/L / x     / 3.1 ng/mL      RADIOLOGY & ADDITIONAL STUDIES: Thrombosed pop aneurysm.

## 2018-05-20 NOTE — H&P ADULT - PROBLEM SELECTOR PLAN 1
-Vitamin K to reverse coumadin / INR of 4.7  -2units FFP    -Start heparin gtt @ 1000 units , no bolus to prevent further clot  -Admit to SICU for continued vascular checks   -Family and Dr. Fry agree to plan for AKA on Monday 5/21   -Echo ordered   -Home meds resumed   -Dilaudid prn for analgesia

## 2018-05-20 NOTE — H&P ADULT - NSHPLABSRESULTS_GEN_ALL_CORE
CTA - L ext iliac open, CFA , profunda patent with narrowing of SFA and complete occlusion distal segment of SFA, no distal flow to extremity. popliteal aneurysm

## 2018-05-20 NOTE — PATIENT PROFILE ADULT. - VISION (WITH CORRECTIVE LENSES IF THE PATIENT USUALLY WEARS THEM):
Normal vision: sees adequately in most situations; can see medication labels, newsprint/Prescription Glassess

## 2018-05-20 NOTE — PROGRESS NOTE ADULT - SUBJECTIVE AND OBJECTIVE BOX
\Spoke to pt and family iin great detail about the findings  Left legt cold to knee no motor no sensation no pusle occluded pop aneurysm  Recommended AKA which will be done monday. Family all understand and agree to the preocedure  Cardio work up will be done pre op

## 2018-05-21 ENCOUNTER — RESULT REVIEW (OUTPATIENT)
Age: 83
End: 2018-05-21

## 2018-05-21 LAB
ANION GAP SERPL CALC-SCNC: 11 MMOL/L — SIGNIFICANT CHANGE UP (ref 5–17)
ANION GAP SERPL CALC-SCNC: 13 MMOL/L — SIGNIFICANT CHANGE UP (ref 5–17)
APTT BLD: 74.1 SEC — HIGH (ref 27.5–37.4)
BASOPHILS # BLD AUTO: 0 K/UL — SIGNIFICANT CHANGE UP (ref 0–0.2)
BASOPHILS NFR BLD AUTO: 0.5 % — SIGNIFICANT CHANGE UP (ref 0–2)
BUN SERPL-MCNC: 16 MG/DL — SIGNIFICANT CHANGE UP (ref 8–20)
BUN SERPL-MCNC: 17 MG/DL — SIGNIFICANT CHANGE UP (ref 8–20)
CALCIUM SERPL-MCNC: 7.7 MG/DL — LOW (ref 8.6–10.2)
CALCIUM SERPL-MCNC: 8.3 MG/DL — LOW (ref 8.6–10.2)
CHLORIDE SERPL-SCNC: 106 MMOL/L — SIGNIFICANT CHANGE UP (ref 98–107)
CHLORIDE SERPL-SCNC: 107 MMOL/L — SIGNIFICANT CHANGE UP (ref 98–107)
CK MB CFR SERPL CALC: 147.2 NG/ML — HIGH (ref 0–6.7)
CK MB CFR SERPL CALC: 67.5 NG/ML — HIGH (ref 0–6.7)
CK SERPL-CCNC: CRITICAL HIGH U/L (ref 30–200)
CK SERPL-CCNC: CRITICAL HIGH U/L (ref 30–200)
CO2 SERPL-SCNC: 22 MMOL/L — SIGNIFICANT CHANGE UP (ref 22–29)
CO2 SERPL-SCNC: 24 MMOL/L — SIGNIFICANT CHANGE UP (ref 22–29)
CREAT SERPL-MCNC: 1.09 MG/DL — SIGNIFICANT CHANGE UP (ref 0.5–1.3)
CREAT SERPL-MCNC: 1.11 MG/DL — SIGNIFICANT CHANGE UP (ref 0.5–1.3)
EOSINOPHIL # BLD AUTO: 0 K/UL — SIGNIFICANT CHANGE UP (ref 0–0.5)
EOSINOPHIL NFR BLD AUTO: 0.5 % — SIGNIFICANT CHANGE UP (ref 0–5)
GLUCOSE BLDC GLUCOMTR-MCNC: 131 MG/DL — HIGH (ref 70–99)
GLUCOSE SERPL-MCNC: 132 MG/DL — HIGH (ref 70–115)
GLUCOSE SERPL-MCNC: 136 MG/DL — HIGH (ref 70–115)
HCT VFR BLD CALC: 38.7 % — LOW (ref 42–52)
HCT VFR BLD CALC: 47.8 % — SIGNIFICANT CHANGE UP (ref 42–52)
HGB BLD-MCNC: 12.5 G/DL — LOW (ref 14–18)
HGB BLD-MCNC: 15.4 G/DL — SIGNIFICANT CHANGE UP (ref 14–18)
INR BLD: 1.11 RATIO — SIGNIFICANT CHANGE UP (ref 0.88–1.16)
LYMPHOCYTES # BLD AUTO: 1.7 K/UL — SIGNIFICANT CHANGE UP (ref 1–4.8)
LYMPHOCYTES # BLD AUTO: 18 % — LOW (ref 20–55)
MAGNESIUM SERPL-MCNC: 2.1 MG/DL — SIGNIFICANT CHANGE UP (ref 1.6–2.6)
MAGNESIUM SERPL-MCNC: 2.3 MG/DL — SIGNIFICANT CHANGE UP (ref 1.6–2.6)
MCHC RBC-ENTMCNC: 31.6 PG — HIGH (ref 27–31)
MCHC RBC-ENTMCNC: 32.2 G/DL — SIGNIFICANT CHANGE UP (ref 32–36)
MCHC RBC-ENTMCNC: 32.3 G/DL — SIGNIFICANT CHANGE UP (ref 32–36)
MCHC RBC-ENTMCNC: 32.3 PG — HIGH (ref 27–31)
MCV RBC AUTO: 100 FL — HIGH (ref 80–94)
MCV RBC AUTO: 98 FL — HIGH (ref 80–94)
MONOCYTES # BLD AUTO: 1.2 K/UL — HIGH (ref 0–0.8)
MONOCYTES NFR BLD AUTO: 12.2 % — HIGH (ref 3–10)
NEUTROPHILS # BLD AUTO: 6.4 K/UL — SIGNIFICANT CHANGE UP (ref 1.8–8)
NEUTROPHILS NFR BLD AUTO: 68.6 % — SIGNIFICANT CHANGE UP (ref 37–73)
PHOSPHATE SERPL-MCNC: 2.6 MG/DL — SIGNIFICANT CHANGE UP (ref 2.4–4.7)
PHOSPHATE SERPL-MCNC: 3.6 MG/DL — SIGNIFICANT CHANGE UP (ref 2.4–4.7)
PLATELET # BLD AUTO: 131 K/UL — LOW (ref 150–400)
PLATELET # BLD AUTO: 137 K/UL — LOW (ref 150–400)
POTASSIUM SERPL-MCNC: 4.1 MMOL/L — SIGNIFICANT CHANGE UP (ref 3.5–5.3)
POTASSIUM SERPL-MCNC: 4.8 MMOL/L — SIGNIFICANT CHANGE UP (ref 3.5–5.3)
POTASSIUM SERPL-SCNC: 4.1 MMOL/L — SIGNIFICANT CHANGE UP (ref 3.5–5.3)
POTASSIUM SERPL-SCNC: 4.8 MMOL/L — SIGNIFICANT CHANGE UP (ref 3.5–5.3)
PROTHROM AB SERPL-ACNC: 12.2 SEC — SIGNIFICANT CHANGE UP (ref 9.8–12.7)
RBC # BLD: 3.87 M/UL — LOW (ref 4.6–6.2)
RBC # BLD: 4.88 M/UL — SIGNIFICANT CHANGE UP (ref 4.6–6.2)
RBC # FLD: 14.1 % — SIGNIFICANT CHANGE UP (ref 11–15.6)
RBC # FLD: 14.3 % — SIGNIFICANT CHANGE UP (ref 11–15.6)
SODIUM SERPL-SCNC: 141 MMOL/L — SIGNIFICANT CHANGE UP (ref 135–145)
SODIUM SERPL-SCNC: 142 MMOL/L — SIGNIFICANT CHANGE UP (ref 135–145)
WBC # BLD: 8.7 K/UL — SIGNIFICANT CHANGE UP (ref 4.8–10.8)
WBC # BLD: 9.4 K/UL — SIGNIFICANT CHANGE UP (ref 4.8–10.8)
WBC # FLD AUTO: 8.7 K/UL — SIGNIFICANT CHANGE UP (ref 4.8–10.8)
WBC # FLD AUTO: 9.4 K/UL — SIGNIFICANT CHANGE UP (ref 4.8–10.8)

## 2018-05-21 PROCEDURE — 88311 DECALCIFY TISSUE: CPT | Mod: 26

## 2018-05-21 PROCEDURE — 88307 TISSUE EXAM BY PATHOLOGIST: CPT | Mod: 26

## 2018-05-21 PROCEDURE — 93010 ELECTROCARDIOGRAM REPORT: CPT

## 2018-05-21 PROCEDURE — 99291 CRITICAL CARE FIRST HOUR: CPT

## 2018-05-21 PROCEDURE — 99222 1ST HOSP IP/OBS MODERATE 55: CPT

## 2018-05-21 RX ORDER — FENTANYL CITRATE 50 UG/ML
25 INJECTION INTRAVENOUS
Qty: 0 | Refills: 0 | Status: DISCONTINUED | OUTPATIENT
Start: 2018-05-21 | End: 2018-05-21

## 2018-05-21 RX ORDER — SODIUM CHLORIDE 9 MG/ML
1000 INJECTION, SOLUTION INTRAVENOUS ONCE
Qty: 0 | Refills: 0 | Status: COMPLETED | OUTPATIENT
Start: 2018-05-21 | End: 2018-05-21

## 2018-05-21 RX ORDER — SODIUM CHLORIDE 9 MG/ML
500 INJECTION, SOLUTION INTRAVENOUS ONCE
Qty: 0 | Refills: 0 | Status: DISCONTINUED | OUTPATIENT
Start: 2018-05-21 | End: 2018-05-21

## 2018-05-21 RX ORDER — SENNA PLUS 8.6 MG/1
2 TABLET ORAL AT BEDTIME
Qty: 0 | Refills: 0 | Status: DISCONTINUED | OUTPATIENT
Start: 2018-05-21 | End: 2018-06-04

## 2018-05-21 RX ORDER — ASPIRIN/CALCIUM CARB/MAGNESIUM 324 MG
81 TABLET ORAL DAILY
Qty: 0 | Refills: 0 | Status: DISCONTINUED | OUTPATIENT
Start: 2018-05-21 | End: 2018-06-04

## 2018-05-21 RX ORDER — GABAPENTIN 400 MG/1
300 CAPSULE ORAL THREE TIMES A DAY
Qty: 0 | Refills: 0 | Status: DISCONTINUED | OUTPATIENT
Start: 2018-05-21 | End: 2018-06-04

## 2018-05-21 RX ORDER — PANTOPRAZOLE SODIUM 20 MG/1
40 TABLET, DELAYED RELEASE ORAL
Qty: 0 | Refills: 0 | Status: DISCONTINUED | OUTPATIENT
Start: 2018-05-21 | End: 2018-06-04

## 2018-05-21 RX ORDER — LANOLIN ALCOHOL/MO/W.PET/CERES
5 CREAM (GRAM) TOPICAL AT BEDTIME
Qty: 0 | Refills: 0 | Status: DISCONTINUED | OUTPATIENT
Start: 2018-05-21 | End: 2018-05-21

## 2018-05-21 RX ORDER — DOCUSATE SODIUM 100 MG
100 CAPSULE ORAL THREE TIMES A DAY
Qty: 0 | Refills: 0 | Status: DISCONTINUED | OUTPATIENT
Start: 2018-05-21 | End: 2018-06-04

## 2018-05-21 RX ORDER — GABAPENTIN 400 MG/1
300 CAPSULE ORAL
Qty: 0 | Refills: 0 | Status: DISCONTINUED | OUTPATIENT
Start: 2018-05-21 | End: 2018-05-21

## 2018-05-21 RX ORDER — SODIUM CHLORIDE 9 MG/ML
1000 INJECTION, SOLUTION INTRAVENOUS
Qty: 0 | Refills: 0 | Status: DISCONTINUED | OUTPATIENT
Start: 2018-05-21 | End: 2018-05-22

## 2018-05-21 RX ORDER — LANOLIN ALCOHOL/MO/W.PET/CERES
5 CREAM (GRAM) TOPICAL AT BEDTIME
Qty: 0 | Refills: 0 | Status: DISCONTINUED | OUTPATIENT
Start: 2018-05-21 | End: 2018-06-04

## 2018-05-21 RX ORDER — DONEPEZIL HYDROCHLORIDE 10 MG/1
10 TABLET, FILM COATED ORAL AT BEDTIME
Qty: 0 | Refills: 0 | Status: DISCONTINUED | OUTPATIENT
Start: 2018-05-21 | End: 2018-06-04

## 2018-05-21 RX ORDER — ATORVASTATIN CALCIUM 80 MG/1
10 TABLET, FILM COATED ORAL AT BEDTIME
Qty: 0 | Refills: 0 | Status: DISCONTINUED | OUTPATIENT
Start: 2018-05-21 | End: 2018-06-04

## 2018-05-21 RX ORDER — SODIUM CHLORIDE 9 MG/ML
1000 INJECTION, SOLUTION INTRAVENOUS
Qty: 0 | Refills: 0 | Status: DISCONTINUED | OUTPATIENT
Start: 2018-05-21 | End: 2018-05-21

## 2018-05-21 RX ORDER — FENTANYL CITRATE 50 UG/ML
50 INJECTION INTRAVENOUS
Qty: 0 | Refills: 0 | Status: DISCONTINUED | OUTPATIENT
Start: 2018-05-21 | End: 2018-05-21

## 2018-05-21 RX ORDER — HYDROMORPHONE HYDROCHLORIDE 2 MG/ML
0.4 INJECTION INTRAMUSCULAR; INTRAVENOUS; SUBCUTANEOUS
Qty: 0 | Refills: 0 | Status: DISCONTINUED | OUTPATIENT
Start: 2018-05-21 | End: 2018-05-21

## 2018-05-21 RX ORDER — ONDANSETRON 8 MG/1
4 TABLET, FILM COATED ORAL ONCE
Qty: 0 | Refills: 0 | Status: DISCONTINUED | OUTPATIENT
Start: 2018-05-21 | End: 2018-05-21

## 2018-05-21 RX ORDER — HEPARIN SODIUM 5000 [USP'U]/ML
5000 INJECTION INTRAVENOUS; SUBCUTANEOUS EVERY 8 HOURS
Qty: 0 | Refills: 0 | Status: DISCONTINUED | OUTPATIENT
Start: 2018-05-21 | End: 2018-05-29

## 2018-05-21 RX ORDER — HYDROMORPHONE HYDROCHLORIDE 2 MG/ML
1 INJECTION INTRAMUSCULAR; INTRAVENOUS; SUBCUTANEOUS
Qty: 0 | Refills: 0 | Status: DISCONTINUED | OUTPATIENT
Start: 2018-05-21 | End: 2018-05-21

## 2018-05-21 RX ORDER — HEPARIN SODIUM 5000 [USP'U]/ML
1200 INJECTION INTRAVENOUS; SUBCUTANEOUS
Qty: 25000 | Refills: 0 | Status: DISCONTINUED | OUTPATIENT
Start: 2018-05-21 | End: 2018-05-21

## 2018-05-21 RX ORDER — HYDROMORPHONE HYDROCHLORIDE 2 MG/ML
0.5 INJECTION INTRAMUSCULAR; INTRAVENOUS; SUBCUTANEOUS
Qty: 0 | Refills: 0 | Status: DISCONTINUED | OUTPATIENT
Start: 2018-05-21 | End: 2018-05-27

## 2018-05-21 RX ADMIN — ATORVASTATIN CALCIUM 10 MILLIGRAM(S): 80 TABLET, FILM COATED ORAL at 21:38

## 2018-05-21 RX ADMIN — HYDROMORPHONE HYDROCHLORIDE 0.5 MILLIGRAM(S): 2 INJECTION INTRAMUSCULAR; INTRAVENOUS; SUBCUTANEOUS at 22:04

## 2018-05-21 RX ADMIN — DONEPEZIL HYDROCHLORIDE 10 MILLIGRAM(S): 10 TABLET, FILM COATED ORAL at 21:38

## 2018-05-21 RX ADMIN — Medication 81 MILLIGRAM(S): at 17:27

## 2018-05-21 RX ADMIN — HYDROMORPHONE HYDROCHLORIDE 0.5 MILLIGRAM(S): 2 INJECTION INTRAMUSCULAR; INTRAVENOUS; SUBCUTANEOUS at 22:20

## 2018-05-21 RX ADMIN — SENNA PLUS 2 TABLET(S): 8.6 TABLET ORAL at 21:38

## 2018-05-21 RX ADMIN — Medication 62.5 MILLIMOLE(S): at 18:40

## 2018-05-21 RX ADMIN — SODIUM CHLORIDE 150 MILLILITER(S): 9 INJECTION, SOLUTION INTRAVENOUS at 21:44

## 2018-05-21 RX ADMIN — HEPARIN SODIUM 5000 UNIT(S): 5000 INJECTION INTRAVENOUS; SUBCUTANEOUS at 21:38

## 2018-05-21 RX ADMIN — LISINOPRIL 5 MILLIGRAM(S): 2.5 TABLET ORAL at 05:29

## 2018-05-21 RX ADMIN — SODIUM CHLORIDE 1000 MILLILITER(S): 9 INJECTION, SOLUTION INTRAVENOUS at 09:12

## 2018-05-21 RX ADMIN — GABAPENTIN 300 MILLIGRAM(S): 400 CAPSULE ORAL at 05:29

## 2018-05-21 RX ADMIN — HEPARIN SODIUM 1000 UNIT(S)/HR: 5000 INJECTION INTRAVENOUS; SUBCUTANEOUS at 06:04

## 2018-05-21 RX ADMIN — Medication 5 MILLIGRAM(S): at 21:38

## 2018-05-21 RX ADMIN — GABAPENTIN 300 MILLIGRAM(S): 400 CAPSULE ORAL at 21:38

## 2018-05-21 RX ADMIN — SODIUM CHLORIDE 100 MILLILITER(S): 9 INJECTION, SOLUTION INTRAVENOUS at 06:26

## 2018-05-21 RX ADMIN — Medication 100 MILLIGRAM(S): at 21:38

## 2018-05-21 NOTE — CONSULT NOTE ADULT - ASSESSMENT
87yo M with Severe Atheroscerosis Arterial occlusuion LLE    LLE without sensory or motor-  Ischemic  Scheduled for L AKA this morning    Alzheimers Dementia will most likely have post-op Delirium    Pre-op Pain- cannot advocate or self report  Expected post-op pain-  Assume pain is present  Multimodal pain management plan needed  Recommend Fentanyl 12 mcg/hr patch  Dialudid 0.5mgIV  Q6 and Q4 prn  Ofirmev infusion  Risperdal for post-op delirium symptom management    Reached out to family; will support during perioperative period; help get through acute crisis.  Will meet post-op with them today    AKD= hydrate 89yo M with Severe Atheroscerosis Arterial occlusuion LLE    LLE without sensory or motor-  Ischemic  S/P L AKA POD #0    Alzheimers Dementia will most likely have post-op Delirium    Pain- cannot advocate or self report  Expected post-op pain-  Assume pain is present  Multimodal pain management plan needed  Recommend Fentanyl 12 mcg/hr patch  Dialudid 0.5mgIV  Q6 and Q4 prn  Ofirmev infusion  Risperdal 0.5 disintegrating tablet  at bedtime and Q8 prn for post-op delirium symptom management    Reached out to family; will support during perioperative period; help get through acute crisis.  Met with wife and family members at bedside, Patient awake, doing well post-op C/O nerve pain otherwise comfortable    AKD= hydrate

## 2018-05-21 NOTE — PROGRESS NOTE ADULT - SUBJECTIVE AND OBJECTIVE BOX
Patient seen, s/p left AKA for acute on chronic arterial insufficiency with neurological and tissue loss.    POD 0  Patient currently with no complaints.  Tolerating PO, pain controlled.  Patient was extubated postoperatively with 50cc reported operative blood loss.    Vital Signs Last 24 Hrs  T(C): 37.5 (21 May 2018 15:40), Max: 37.5 (21 May 2018 15:40)  T(F): 99.5 (21 May 2018 15:40), Max: 99.5 (21 May 2018 15:40)  HR: 101 (21 May 2018 16:00) (77 - 108)  BP: 131/72 (21 May 2018 16:00) (107/86 - 175/78)  BP(mean): 96 (21 May 2018 16:00) (94 - 118)  RR: 30 (21 May 2018 16:00) (13 - 30)  SpO2: 97% (21 May 2018 16:00) (92% - 100%)  I&O's Detail    20 May 2018 07:01  -  21 May 2018 07:00  --------------------------------------------------------  IN:    heparin  Infusion.: 258 mL    multiple electrolytes Injection Type 1multiple electrolytes Injection Type 1: 200 mL    multiple electrolytes Injection Type 1multiple electrolytes Injection Type 1: 500 mL    Oral Fluid: 590 mL    Solution: 100 mL    Solution: 100 mL    Solution: 664 mL  Total IN: 2412 mL    OUT:    Indwelling Catheter - Urethral: 1915 mL  Total OUT: 1915 mL    Total NET: 497 mL      21 May 2018 07:01  -  21 May 2018 16:20  --------------------------------------------------------  IN:    heparin  Infusion.: 30 mL    multiple electrolytes Injection Type 1: 300 mL    multiple electrolytes Injection Type 1multiple electrolytes Injection Type 1: 1200 mL    Oral Fluid: 120 mL  Total IN: 1650 mL    OUT:    Indwelling Catheter - Urethral: 450 mL  Total OUT: 450 mL    Total NET: 1200 mL        aspirin  chewable 81  heparin  Injectable 5000    PAST MEDICAL & SURGICAL HISTORY:  DVT (deep venous thrombosis)  Cataract of both eyes  Prostate CA  AAA (abdominal aortic aneurysm)  Alzheimer disease  Hypertension  DVT (deep venous thrombosis)  AAA (abdominal aortic aneurysm): 5cm  Right iliac aneurysm 4cm  Alzheimer disease  Cataract  Prostate ca  Gallstones  History of cataract surgery  H/O prostatectomy  History of cholecystectomy  H/O prostatectomy: 1998  Status post cataract surgery: bilateral  History of cholecystectomy: 1997    Physical Exam:  General: NAD, resting comfortably in bed  Pulmonary: Nonlabored breathing, no respiratory distress  Cardiovascular: NSR  Abdominal: soft, distended, non tender   Extremities: left AKA stump with clean dressing in place.  Right foot with palpable DP        LABS:                        12.5   8.7   )-----------( 131      ( 21 May 2018 14:43 )             38.7     05-21    141  |  106  |  17.0  ----------------------------<  132<H>  4.8   |  24.0  |  1.09    Ca    7.7<L>      21 May 2018 14:43  Phos  2.6     05-21  Mg     2.1     05-21    TPro  7.3  /  Alb  3.7  /  TBili  0.8  /  DBili  x   /  AST  34  /  ALT  39  /  AlkPhos  164<H>  05-20    PT/INR - ( 21 May 2018 05:39 )   PT: 12.2 sec;   INR: 1.11 ratio         PTT - ( 21 May 2018 05:39 )  PTT:74.1 sec  CAPILLARY BLOOD GLUCOSE      POCT Blood Glucose.: 131 mg/dL (21 May 2018 05:26)  POCT Blood Glucose.: 137 mg/dL (20 May 2018 23:12)  POCT Blood Glucose.: 131 mg/dL (20 May 2018 16:55)      Radiology and Additional Studies:    Assessment:88yMaleHPI:    S/P Left AKA, Patient is stable with no complaints, doing well   Patient with aneurysmal abdominal aorta, bilateral common iliacs and right popliteal, will likely need additional intervention post AKA recovery, will discuss with Dr. Fry/[place]       POD# 0        Plan:  Continue to hold heparin ggt  PO diet  Bed rest for now, dressing to remain in place for 48-72 hours   Cont IV Hydration trend creatine and CK

## 2018-05-21 NOTE — CONSULT NOTE ADULT - SUBJECTIVE AND OBJECTIVE BOX
HPI: Jacinto is an 87yo M PMH of Alzheimers Dementia, Atherosclerosis. Multiple Aneurysms with severelower extremity arterial insufficiency. Admitted with worsening LLE  mottling, cold temperature unable to ambulate; scheduled for a L AKA this morning.  CC:  Confusion--Forgetfulness-Awake and alert to self this morning, "What's going on ? where are they taking me?" Gently reoriented, and reassured we will be taking care of his leg.  Asked about LLE pain he said no. Cannot feel leg (sensation)-not moving lower extremity independently- wincing when I touched affected leg.    88 year old male w/PMH of Alzheimers, prostate ca s/p prostatectomy transferred here from [place] with pulselessness, pain and numbness to [facility] that developed around 9pm . Patients family reports a recent diagnosis of DVT to [facility] and started Lovenox / coumadin therapy about 10 days ago with a recent INR of 2.3 a few days ago, however INR - 4.2 on repeat labs here . Lactate uptrending from 2.6 to 4.7 . Pt comes in on cardene gtt for SBP > 200 .     Pts family report no history of vascular intervention .   Pt complains of pain despite medication, loss of sensation and ability to move leg.  Pt normally ambulates independently at home. (20 May 2018 06:48)      PERTINENT PMH REVIEWED: Yes     PAST MEDICAL & SURGICAL HISTORY:  DVT (deep venous thrombosis)  Cataract of both eyes  Prostate CA  AAA (abdominal aortic aneurysm)  Alzheimer disease  Hypertension  DVT (deep venous thrombosis)  AAA (abdominal aortic aneurysm): 5cm  Right iliac aneurysm 4cm  Alzheimer disease  Cataract  Prostate ca  Gallstones  History of cataract surgery  H/O prostatectomy  History of cholecystectomy  H/O prostatectomy: 1998  Status post cataract surgery: bilateral  History of cholecystectomy: 1997      SOCIAL HISTORY:  EtOH    No                                    Drugs    No                                     nonsmoker                                    Admitted from: home  Wife: Key Díaz [phone]    FAMILY HISTORY:  No pertinent family history in first degree relatives  Family history of Alzheimer's disease (Sibling)  Family history of heart disease (Father)      No Known Allergies    Baseline ADLs (prior to admission):  Independent/ Dependent      Present Symptoms:     Dyspnea: 0   Nausea/Vomiting:  No  Anxiety:  Yes   Depression: Yes No  Fatigue: Yes No awake  Loss of appetite: Yes No    Pain: Not able to self advocate denies pain- We must assume Pain is Present            Character-            Duration-            Effect-            Factors-            Frequency-            Location-            Severity-LLE    Review of Systems: Reviewed                    Unable to obtain due to poor mentation   All others negative  MEDICATIONS  (STANDING):  aspirin  chewable 81 milliGRAM(s) Oral daily  atorvastatin 10 milliGRAM(s) Oral at bedtime  ceFAZolin  Injectable. 2000 milliGRAM(s) IV Push once  donepezil 10 milliGRAM(s) Oral at bedtime  gabapentin 300 milliGRAM(s) Oral three times a day  heparin  Infusion. 1200 Unit(s)/Hr (12 mL/Hr) IV Continuous <Continuous>  lisinopril 5 milliGRAM(s) Oral daily  melatonin 5 milliGRAM(s) Oral at bedtime  multiple electrolytes Injection Type 1 1000 milliLiter(s) (100 mL/Hr) IV Continuous <Continuous>  pantoprazole    Tablet 40 milliGRAM(s) Oral before breakfast    MEDICATIONS  (PRN):  HYDROmorphone  Injectable 0.5 milliGRAM(s) IV Push every 3 hours PRN Moderate Pain (4 - 6)  HYDROmorphone  Injectable 1 milliGRAM(s) IV Push every 3 hours PRN Severe Pain (7 - 10)      PHYSICAL EXAM:    Vital Signs Last 24 Hrs  T(C): 36.7 (21 May 2018 08:00), Max: 37.2 (20 May 2018 12:10)  T(F): 98.1 (21 May 2018 08:00), Max: 99 (20 May 2018 20:00)  HR: 77 (21 May 2018 10:00) (77 - 101)  BP: 148/70 (21 May 2018 10:00) (132/68 - 179/83)  BP(mean): 100 (21 May 2018 10:00) (94 - 119)  RR: 16 (21 May 2018 10:00) (13 - 20)  SpO2: 97% (21 May 2018 10:00) (92% - 97%)    General: alert  oriented x ____ lethargic agitated                  cachexia  nonverbal  coma    Karnofsky:  %    HEENT: normal  dry mouth  ET tube/trach    Lungs: comfortable tachypnea/labored breathing  excessive secretions    CV: normal  tachycardia    GI: normal  distended  tender  no BS               PEG/NG/OG tube  constipation  last BM:     : normal  incontinent  oliguria/anuria  parra    MSK: normal  weakness  edema             ambulatory  bedbound/wheelchair bound    Skin: normal  pressure ulcers- Stage_____  no rash    LABS:                        15.4   9.4   )-----------( 137      ( 21 May 2018 05:39 )             47.8     05-21    142  |  107  |  16.0  ----------------------------<  136<H>  4.1   |  22.0  |  1.11    Ca    8.3<L>      21 May 2018 05:39  Phos  3.6     05-21  Mg     2.3     05-21    TPro  7.3  /  Alb  3.7  /  TBili  0.8  /  DBili  x   /  AST  34  /  ALT  39  /  AlkPhos  164<H>  05-20    PT/INR - ( 21 May 2018 05:39 )   PT: 12.2 sec;   INR: 1.11 ratio         PTT - ( 21 May 2018 05:39 )  PTT:74.1 sec    I&O's Summary    20 May 2018 07:01  -  21 May 2018 07:00  --------------------------------------------------------  IN: 2412 mL / OUT: 1915 mL / NET: 497 mL    21 May 2018 07:01  -  21 May 2018 11:04  --------------------------------------------------------  IN: 1230 mL / OUT: 250 mL / NET: 980 mL        RADIOLOGY & ADDITIONAL STUDIES:    ADVANCE DIRECTIVES:   DNR YES   Completed on:          Rescinded for OR today          MOL   NO   Completed on:  Living Will   NO   Completed on:      COUNSELING:    Face to face meeting to discuss Advanced Care Planning - Time Spent ______ Minutes.  See goals of care note.  ** Called daughter Ayala Koenig-notified her patient on the way to OR  More than 50% time spent in counseling and coordinating care. ______ Minutes.                                               He was calm; confused-no idea where he was going, or remebered  	  Thank you for the opportunity to assist with the care of this patient.   [facility] [phone]. HPI: Jacinto is an 89yo M PMH of Alzheimers Dementia, Atherosclerosis. Multiple Aneurysms with severelower extremity arterial insufficiency. Admitted with worsening LLE  mottling, cold temperature unable to ambulate; scheduled for a L AKA this morning.  CC:  Confusion--Forgetfulness-Awake and alert to self this morning, "What's going on ? where are they taking me?" Gently reoriented, and reassured we will be taking care of his leg.  Asked about LLE pain he said no. Cannot feel leg (sensation)-not moving lower extremity independently- wincing when I touched affected leg.    88 year old male w/PMH of Alzheimers, prostate ca s/p prostatectomy transferred here from [place] with pulselessness, pain and numbness to [facility] that developed around 9pm . Patients family reports a recent diagnosis of DVT to [facility] and started Lovenox / coumadin therapy about 10 days ago with a recent INR of 2.3 a few days ago, however INR - 4.2 on repeat labs here . Lactate uptrending from 2.6 to 4.7 . Pt comes in on cardene gtt for SBP > 200 .     Pts family report no history of vascular intervention .   Pt complains of pain despite medication, loss of sensation and ability to move leg.  Pt normally ambulates independently at home. (20 May 2018 06:48)      PERTINENT PMH REVIEWED: Yes     PAST MEDICAL & SURGICAL HISTORY:  DVT (deep venous thrombosis)  Cataract of both eyes  Prostate CA  AAA (abdominal aortic aneurysm)  Alzheimer disease  Hypertension  DVT (deep venous thrombosis)  AAA (abdominal aortic aneurysm): 5cm  Right iliac aneurysm 4cm  Alzheimer disease  Cataract  Prostate ca  Gallstones  History of cataract surgery  H/O prostatectomy  History of cholecystectomy  H/O prostatectomy: 1998  Status post cataract surgery: bilateral  History of cholecystectomy: 1997      SOCIAL HISTORY:  EtOH    No                                    Drugs    No                                     nonsmoker                                    Admitted from: home  Wife: Key Díaz [phone]    FAMILY HISTORY:  No pertinent family history in first degree relatives  Family history of Alzheimer's disease (Sibling)  Family history of heart disease (Father)      No Known Allergies    Baseline ADLs (prior to admission):  Independent/ Dependent      Present Symptoms:     Dyspnea: 0   Nausea/Vomiting:  No  Anxiety:  Yes   Depression: Yes No  Fatigue: Yes No awake  Loss of appetite: Yes No    Pain: Not able to self advocate denies pain- We must assume Pain is Present            Character-            Duration-            Effect-            Factors-            Frequency-            Location-            Severity-LLE    Review of Systems: Reviewed                    Unable to obtain due to poor mentation   All others negative  MEDICATIONS  (STANDING):  aspirin  chewable 81 milliGRAM(s) Oral daily  atorvastatin 10 milliGRAM(s) Oral at bedtime  ceFAZolin  Injectable. 2000 milliGRAM(s) IV Push once  donepezil 10 milliGRAM(s) Oral at bedtime  gabapentin 300 milliGRAM(s) Oral three times a day  heparin  Infusion. 1200 Unit(s)/Hr (12 mL/Hr) IV Continuous <Continuous>  lisinopril 5 milliGRAM(s) Oral daily  melatonin 5 milliGRAM(s) Oral at bedtime  multiple electrolytes Injection Type 1 1000 milliLiter(s) (100 mL/Hr) IV Continuous <Continuous>  pantoprazole    Tablet 40 milliGRAM(s) Oral before breakfast    MEDICATIONS  (PRN):  HYDROmorphone  Injectable 0.5 milliGRAM(s) IV Push every 3 hours PRN Moderate Pain (4 - 6)  HYDROmorphone  Injectable 1 milliGRAM(s) IV Push every 3 hours PRN Severe Pain (7 - 10)      PHYSICAL EXAM:    Vital Signs Last 24 Hrs  T(C): 36.7 (21 May 2018 08:00), Max: 37.2 (20 May 2018 12:10)  T(F): 98.1 (21 May 2018 08:00), Max: 99 (20 May 2018 20:00)  HR: 77 (21 May 2018 10:00) (77 - 101)  BP: 148/70 (21 May 2018 10:00) (132/68 - 179/83)  BP(mean): 100 (21 May 2018 10:00) (94 - 119)  RR: 16 (21 May 2018 10:00) (13 - 20)  SpO2: 97% (21 May 2018 10:00) (92% - 97%)    General: alert  oriented x ____ lethargic agitated                  cachexia  nonverbal  coma    Karnofsky:  %    HEENT: normal  dry mouth  ET tube/trach    Lungs: comfortable tachypnea/labored breathing  excessive secretions    CV: normal  tachycardia    GI: normal  distended  tender  no BS               PEG/NG/OG tube  constipation  last BM:     : normal  incontinent  oliguria/anuria  parra    MSK: normal  weakness  edema             ambulatory  bedbound/wheelchair bound    Skin: normal  pressure ulcers- Stage_____  no rash    LABS:                        15.4   9.4   )-----------( 137      ( 21 May 2018 05:39 )             47.8     05-21    142  |  107  |  16.0  ----------------------------<  136<H>  4.1   |  22.0  |  1.11    Ca    8.3<L>      21 May 2018 05:39  Phos  3.6     05-21  Mg     2.3     05-21    TPro  7.3  /  Alb  3.7  /  TBili  0.8  /  DBili  x   /  AST  34  /  ALT  39  /  AlkPhos  164<H>  05-20    PT/INR - ( 21 May 2018 05:39 )   PT: 12.2 sec;   INR: 1.11 ratio         PTT - ( 21 May 2018 05:39 )  PTT:74.1 sec    I&O's Summary    20 May 2018 07:01  -  21 May 2018 07:00  --------------------------------------------------------  IN: 2412 mL / OUT: 1915 mL / NET: 497 mL    21 May 2018 07:01  -  21 May 2018 11:04  --------------------------------------------------------  IN: 1230 mL / OUT: 250 mL / NET: 980 mL        RADIOLOGY & ADDITIONAL STUDIES:    ADVANCE DIRECTIVES:   DNR YES   Completed on:          Rescinded for OR today          [facility]   NO   Completed on:  Living Will   NO   Completed on:

## 2018-05-21 NOTE — BRIEF OPERATIVE NOTE - PROCEDURE
<<-----Click on this checkbox to enter Procedure Amputation above knee  05/21/2018  Left leg  Active  JTHERIOT

## 2018-05-21 NOTE — PROGRESS NOTE ADULT - SUBJECTIVE AND OBJECTIVE BOX
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  No acute overnight events reported.  Patient also with aneurysmal dilations to his Abdominal aorta, bilateral iliacs and bilateral popliteals.        (20 May 2018 06:48)    ceFAZolin  Injectable. 2000  aspirin  chewable 81  ceFAZolin  Injectable. 2000  heparin  Infusion. 1200  lisinopril 5    Allergies    No Known Allergies    Intolerances        Vital Signs Last 24 Hrs  T(C): 36.7 (21 May 2018 08:00), Max: 37.2 (20 May 2018 12:10)  T(F): 98.1 (21 May 2018 08:00), Max: 99 (20 May 2018 20:00)  HR: 84 (21 May 2018 07:00) (79 - 101)  BP: 132/68 (21 May 2018 07:00) (132/68 - 179/83)  BP(mean): 94 (21 May 2018 07:00) (94 - 119)  RR: 15 (21 May 2018 07:00) (13 - 20)  SpO2: 93% (21 May 2018 07:00) (92% - 97%)  I&O's Detail    20 May 2018 07:01  -  21 May 2018 07:00  --------------------------------------------------------  IN:    heparin  Infusion.: 258 mL    multiple electrolytes Injection Type 1: 200 mL    multiple electrolytes Injection Type 1multiple electrolytes Injection Type 1: 500 mL    Oral Fluid: 590 mL    Solution: 100 mL    Solution: 100 mL    Solution: 664 mL  Total IN: 2412 mL    OUT:    Indwelling Catheter - Urethral: 1915 mL  Total OUT: 1915 mL    Total NET: 497 mL          Physical Exam:  General: NAD, resting comfortably in bed  Pulmonary: Nonlabored breathing, no respiratory distress  Cardiovascular: NSR  Abdominal: soft, Distended, No tenderness  Extremities: Mottled left foot, cold, no motor or sensory fxn.  Unable to flex left knee  Pulses:   Right:    Right Femoral palpable, + dopplerable signal to right DP                                                                      Left: no palpable pulses                                                      LABS:                        15.4   9.4   )-----------( 137      ( 21 May 2018 05:39 )             47.8     05-21    142  |  107  |  16.0  ----------------------------<  136<H>  4.1   |  22.0  |  1.11    Ca    8.3<L>      21 May 2018 05:39  Phos  3.6     05-21  Mg     2.3     05-21    TPro  7.3  /  Alb  3.7  /  TBili  0.8  /  DBili  x   /  AST  34  /  ALT  39  /  AlkPhos  164<H>  05-20    PT/INR - ( 21 May 2018 05:39 )   PT: 12.2 sec;   INR: 1.11 ratio         PTT - ( 21 May 2018 05:39 )  PTT:74.1 sec  CAPILLARY BLOOD GLUCOSE      POCT Blood Glucose.: 131 mg/dL (21 May 2018 05:26)  POCT Blood Glucose.: 137 mg/dL (20 May 2018 23:12)  POCT Blood Glucose.: 131 mg/dL (20 May 2018 16:55)  POCT Blood Glucose.: 114 mg/dL (20 May 2018 11:51)    Radiology and Additional Studies:    Assessment and Plan:      88y Male with Atherosclerosis, multiple aneurysms, acute on chronic arterial occlusion of the left lower extremity with extensive tissue loss requiring left above the knee amputation.     - Keep NPO  - hold heparin ggt on call to the OR      DVT (deep venous thrombosis)  Cataract of both eyes  Prostate CA  AAA (abdominal aortic aneurysm)  Alzheimer disease  Hypertension  DVT (deep venous thrombosis)  AAA (abdominal aortic aneurysm)  Alzheimer disease  Cataract  Prostate ca  Gallstones  History of cataract surgery  H/O prostatectomy  History of cholecystectomy  H/O prostatectomy  Status post cataract surgery  History of cholecystectomy

## 2018-05-21 NOTE — PROGRESS NOTE ADULT - SUBJECTIVE AND OBJECTIVE BOX
INTERVAL HPI/OVERNIGHT EVENTS:  urine output was marginal. on heparin drip for cold left leg.    PRESSORS: [ ] YES [x ] NO  WHICH:  DOSE:    ANTIBIOTICS:          x        DATE STARTED:  ANTIBIOTICS:                  DATE STARTED:  ANTIBIOTICS:                  DATE STARTED:    MEDICATIONS  (STANDING):  aspirin  chewable 81 milliGRAM(s) Oral daily  atorvastatin 10 milliGRAM(s) Oral at bedtime  ceFAZolin  Injectable. 2000 milliGRAM(s) IV Push once  donepezil 10 milliGRAM(s) Oral at bedtime  gabapentin 300 milliGRAM(s) Oral three times a day  lisinopril 5 milliGRAM(s) Oral daily  melatonin 5 milliGRAM(s) Oral at bedtime  multiple electrolytes Injection Type 1 1000 milliLiter(s) (100 mL/Hr) IV Continuous <Continuous>  pantoprazole    Tablet 40 milliGRAM(s) Oral before breakfast    MEDICATIONS  (PRN):  HYDROmorphone  Injectable 0.5 milliGRAM(s) IV Push every 3 hours PRN Moderate Pain (4 - 6)  HYDROmorphone  Injectable 1 milliGRAM(s) IV Push every 3 hours PRN Severe Pain (7 - 10)      Drug Dosing Weight  Height (cm): 180.34 (19 May 2018 23:36)  Weight (kg): 89.8 (19 May 2018 23:36)  BMI (kg/m2): 27.6 (19 May 2018 23:36)  BSA (m2): 2.1 (19 May 2018 23:36)    CENTRAL LINE: [ ] YES [x ] NO  LOCATION:   DATE INSERTED:  REMOVE: [ ] YES [ ] NO  EXPLAIN:    MICHAELS: [x ] YES [ ] NO    DATE INSERTED:  REMOVE: [ ] YES [ ] NO  EXPLAIN:    A-LINE: [ ] YES [x ] NO  LOCATION:   DATE INSERTED:  REMOVE: [ ] YES [ ] NO  EXPLAIN:    PAST MEDICAL & SURGICAL HISTORY:  DVT (deep venous thrombosis)  Cataract of both eyes  Prostate CA  AAA (abdominal aortic aneurysm)  Alzheimer disease  Hypertension  DVT (deep venous thrombosis)  AAA (abdominal aortic aneurysm): 5cm  Right iliac aneurysm 4cm  Alzheimer disease  Cataract  Prostate ca  Gallstones  History of cataract surgery  H/O prostatectomy  History of cholecystectomy  H/O prostatectomy: 1998  Status post cataract surgery: bilateral  History of cholecystectomy: 1997      ICU Vital Signs Last 24 Hrs  T(C): 36.8 (21 May 2018 11:17), Max: 37.2 (20 May 2018 20:00)  T(F): 98.2 (21 May 2018 11:17), Max: 99 (20 May 2018 20:00)  HR: 87 (21 May 2018 11:17) (77 - 101)  BP: 140/79 (21 May 2018 11:17) (132/68 - 179/83)  BP(mean): 100 (21 May 2018 10:00) (94 - 119)  ABP: --  ABP(mean): --  RR: 16 (21 May 2018 11:17) (15 - 20)  SpO2: 100% (21 May 2018 11:17) (92% - 100%)      ABG - ( 20 May 2018 08:29 )  pH, Arterial: 7.40  pH, Blood: x     /  pCO2: 39    /  pO2: 84    / HCO3: 24    / Base Excess: -0.8  /  SaO2: 97                  I&O's Detail    20 May 2018 07:01  -  21 May 2018 07:00  --------------------------------------------------------  IN:    heparin  Infusion.: 258 mL    multiple electrolytes Injection Type 1: 200 mL    multiple electrolytes Injection Type 1multiple electrolytes Injection Type 1: 500 mL    Oral Fluid: 590 mL    Solution: 100 mL    Solution: 100 mL    Solution: 664 mL  Total IN: 2412 mL    OUT:    Indwelling Catheter - Urethral: 1915 mL  Total OUT: 1915 mL    Total NET: 497 mL      21 May 2018 07:01  -  21 May 2018 12:26  --------------------------------------------------------  IN:    heparin  Infusion.: 30 mL    multiple electrolytes Injection Type 1: 1200 mL  Total IN: 1230 mL    OUT:    Indwelling Catheter - Urethral: 250 mL  Total OUT: 250 mL    Total NET: 980 mL              Physical Exam:    Neurological:  a/o x 2 confussed, hx of dementia.    HEENT: PERRLA, EOMI, no drainage or redness    Neck: No bruits; no thyromegaly or nodules,  No JVD    Back: Normal spine flexure, No CVA tenderness, No deformity or limitation of movement    Respiratory: Breath Sounds equal & clear    Cardiovascular: afib, no rvr    Gastrointestinal: Soft, non-tender, normal bowel sounds    Extremities: rle : warm good sensation, lle: no sensation below knee, mottled to knee no movement.     Vascular: +dopplerable rle dp, no pulse noted to lle.      LABS:  CBC Full  -  ( 21 May 2018 05:39 )  WBC Count : 9.4 K/uL  Hemoglobin : 15.4 g/dL  Hematocrit : 47.8 %  Platelet Count - Automated : 137 K/uL  Mean Cell Volume : 98.0 fl  Mean Cell Hemoglobin : 31.6 pg  Mean Cell Hemoglobin Concentration : 32.2 g/dL  Auto Neutrophil # : 6.4 K/uL  Auto Lymphocyte # : 1.7 K/uL  Auto Monocyte # : 1.2 K/uL  Auto Eosinophil # : 0.0 K/uL  Auto Basophil # : 0.0 K/uL  Auto Neutrophil % : 68.6 %  Auto Lymphocyte % : 18.0 %  Auto Monocyte % : 12.2 %  Auto Eosinophil % : 0.5 %  Auto Basophil % : 0.5 %    05-21    142  |  107  |  16.0  ----------------------------<  136<H>  4.1   |  22.0  |  1.11    Ca    8.3<L>      21 May 2018 05:39  Phos  3.6     05-21  Mg     2.3     05-21    TPro  7.3  /  Alb  3.7  /  TBili  0.8  /  DBili  x   /  AST  34  /  ALT  39  /  AlkPhos  164<H>  05-20    PT/INR - ( 21 May 2018 05:39 )   PT: 12.2 sec;   INR: 1.11 ratio         PTT - ( 21 May 2018 05:39 )  PTT:74.1 sec      Assessment and Plan:    Neuro:  Monitor for delirium.  Continue to optimize pain control.     CV: Continue hemodynamic monitoring, resume home meds    Pulm: Pulmonary toilet.  Continue incentive spirometer.  Chest PT.  Encourage OOB to chair and ambulation     GI/Nutrition: Bowel Regimen, will start diet after surgery today.    /Renal: monitor UOP. Monitor BMP.  Replete Lytes as needed  . the patient has now developed rhabdo, cpk >28K, increase LR to 150cc/hour, check labs q6hr. keep hourly output .0.5cc/kg/hour.    HEME- hep drip , ptt in good range.    ID: x    Lines/Tubes: keep fole yfor monitoring i/o strictly     Endo: x    Skin: x    Dispo: sicu    critical care time : 45min

## 2018-05-21 NOTE — BRIEF OPERATIVE NOTE - OPERATION/FINDINGS
Well perfused tissue above the knee. No signs of infection or ischemia. Critical structures identified. Hemostasis achieved. Closed in multiple layers.

## 2018-05-22 LAB
ANION GAP SERPL CALC-SCNC: 13 MMOL/L — SIGNIFICANT CHANGE UP (ref 5–17)
APTT BLD: 26.7 SEC — LOW (ref 27.5–37.4)
BUN SERPL-MCNC: 17 MG/DL — SIGNIFICANT CHANGE UP (ref 8–20)
CALCIUM SERPL-MCNC: 7.3 MG/DL — LOW (ref 8.6–10.2)
CHLORIDE SERPL-SCNC: 104 MMOL/L — SIGNIFICANT CHANGE UP (ref 98–107)
CK MB CFR SERPL CALC: 11.2 NG/ML — HIGH (ref 0–6.7)
CK MB CFR SERPL CALC: 16.6 NG/ML — HIGH (ref 0–6.7)
CK MB CFR SERPL CALC: 23.3 NG/ML — HIGH (ref 0–6.7)
CK SERPL-CCNC: 5196 U/L — HIGH (ref 30–200)
CK SERPL-CCNC: 5855 U/L — HIGH (ref 30–200)
CK SERPL-CCNC: 7111 U/L — HIGH (ref 30–200)
CO2 SERPL-SCNC: 23 MMOL/L — SIGNIFICANT CHANGE UP (ref 22–29)
CREAT SERPL-MCNC: 1.02 MG/DL — SIGNIFICANT CHANGE UP (ref 0.5–1.3)
EOSINOPHIL # BLD AUTO: 0 K/UL — SIGNIFICANT CHANGE UP (ref 0–0.5)
EOSINOPHIL NFR BLD AUTO: 0 % — SIGNIFICANT CHANGE UP (ref 0–5)
GLUCOSE SERPL-MCNC: 156 MG/DL — HIGH (ref 70–115)
HCT VFR BLD CALC: 33.9 % — LOW (ref 42–52)
HGB BLD-MCNC: 10.8 G/DL — LOW (ref 14–18)
LYMPHOCYTES # BLD AUTO: 0.8 K/UL — LOW (ref 1–4.8)
LYMPHOCYTES # BLD AUTO: 7.5 % — LOW (ref 20–55)
MAGNESIUM SERPL-MCNC: 2.1 MG/DL — SIGNIFICANT CHANGE UP (ref 1.6–2.6)
MCHC RBC-ENTMCNC: 31.6 PG — HIGH (ref 27–31)
MCHC RBC-ENTMCNC: 31.9 G/DL — LOW (ref 32–36)
MCV RBC AUTO: 99.1 FL — HIGH (ref 80–94)
MONOCYTES # BLD AUTO: 1.5 K/UL — HIGH (ref 0–0.8)
MONOCYTES NFR BLD AUTO: 13.1 % — HIGH (ref 3–10)
NEUTROPHILS # BLD AUTO: 8.8 K/UL — HIGH (ref 1.8–8)
NEUTROPHILS NFR BLD AUTO: 79.2 % — HIGH (ref 37–73)
PHOSPHATE SERPL-MCNC: 2.6 MG/DL — SIGNIFICANT CHANGE UP (ref 2.4–4.7)
PLATELET # BLD AUTO: 136 K/UL — LOW (ref 150–400)
POTASSIUM SERPL-MCNC: 4.6 MMOL/L — SIGNIFICANT CHANGE UP (ref 3.5–5.3)
POTASSIUM SERPL-SCNC: 4.6 MMOL/L — SIGNIFICANT CHANGE UP (ref 3.5–5.3)
RBC # BLD: 3.42 M/UL — LOW (ref 4.6–6.2)
RBC # FLD: 13.7 % — SIGNIFICANT CHANGE UP (ref 11–15.6)
SODIUM SERPL-SCNC: 140 MMOL/L — SIGNIFICANT CHANGE UP (ref 135–145)
WBC # BLD: 11.1 K/UL — HIGH (ref 4.8–10.8)
WBC # FLD AUTO: 11.1 K/UL — HIGH (ref 4.8–10.8)

## 2018-05-22 PROCEDURE — 93971 EXTREMITY STUDY: CPT | Mod: 26,LT

## 2018-05-22 RX ORDER — SODIUM CHLORIDE 9 MG/ML
1000 INJECTION, SOLUTION INTRAVENOUS
Qty: 0 | Refills: 0 | Status: DISCONTINUED | OUTPATIENT
Start: 2018-05-22 | End: 2018-05-30

## 2018-05-22 RX ORDER — LISINOPRIL 2.5 MG/1
5 TABLET ORAL DAILY
Qty: 0 | Refills: 0 | Status: DISCONTINUED | OUTPATIENT
Start: 2018-05-22 | End: 2018-06-04

## 2018-05-22 RX ADMIN — GABAPENTIN 300 MILLIGRAM(S): 400 CAPSULE ORAL at 05:49

## 2018-05-22 RX ADMIN — HYDROMORPHONE HYDROCHLORIDE 0.5 MILLIGRAM(S): 2 INJECTION INTRAMUSCULAR; INTRAVENOUS; SUBCUTANEOUS at 19:45

## 2018-05-22 RX ADMIN — Medication 81 MILLIGRAM(S): at 12:04

## 2018-05-22 RX ADMIN — Medication 5 MILLIGRAM(S): at 21:18

## 2018-05-22 RX ADMIN — GABAPENTIN 300 MILLIGRAM(S): 400 CAPSULE ORAL at 21:18

## 2018-05-22 RX ADMIN — HEPARIN SODIUM 5000 UNIT(S): 5000 INJECTION INTRAVENOUS; SUBCUTANEOUS at 14:56

## 2018-05-22 RX ADMIN — DONEPEZIL HYDROCHLORIDE 10 MILLIGRAM(S): 10 TABLET, FILM COATED ORAL at 21:18

## 2018-05-22 RX ADMIN — SENNA PLUS 2 TABLET(S): 8.6 TABLET ORAL at 21:18

## 2018-05-22 RX ADMIN — GABAPENTIN 300 MILLIGRAM(S): 400 CAPSULE ORAL at 14:56

## 2018-05-22 RX ADMIN — HEPARIN SODIUM 5000 UNIT(S): 5000 INJECTION INTRAVENOUS; SUBCUTANEOUS at 05:48

## 2018-05-22 RX ADMIN — Medication 100 MILLIGRAM(S): at 14:56

## 2018-05-22 RX ADMIN — PANTOPRAZOLE SODIUM 40 MILLIGRAM(S): 20 TABLET, DELAYED RELEASE ORAL at 05:54

## 2018-05-22 RX ADMIN — HEPARIN SODIUM 5000 UNIT(S): 5000 INJECTION INTRAVENOUS; SUBCUTANEOUS at 21:18

## 2018-05-22 RX ADMIN — HYDROMORPHONE HYDROCHLORIDE 0.5 MILLIGRAM(S): 2 INJECTION INTRAMUSCULAR; INTRAVENOUS; SUBCUTANEOUS at 20:00

## 2018-05-22 RX ADMIN — Medication 100 MILLIGRAM(S): at 05:49

## 2018-05-22 RX ADMIN — Medication 62.5 MILLIMOLE(S): at 09:58

## 2018-05-22 RX ADMIN — HYDROMORPHONE HYDROCHLORIDE 0.5 MILLIGRAM(S): 2 INJECTION INTRAMUSCULAR; INTRAVENOUS; SUBCUTANEOUS at 09:58

## 2018-05-22 RX ADMIN — ATORVASTATIN CALCIUM 10 MILLIGRAM(S): 80 TABLET, FILM COATED ORAL at 21:18

## 2018-05-22 RX ADMIN — HYDROMORPHONE HYDROCHLORIDE 0.5 MILLIGRAM(S): 2 INJECTION INTRAMUSCULAR; INTRAVENOUS; SUBCUTANEOUS at 10:13

## 2018-05-22 RX ADMIN — LISINOPRIL 5 MILLIGRAM(S): 2.5 TABLET ORAL at 12:04

## 2018-05-22 RX ADMIN — Medication 100 MILLIGRAM(S): at 21:18

## 2018-05-22 RX ADMIN — SODIUM CHLORIDE 100 MILLILITER(S): 9 INJECTION, SOLUTION INTRAVENOUS at 21:18

## 2018-05-22 NOTE — PROGRESS NOTE ADULT - SUBJECTIVE AND OBJECTIVE BOX
Patient is a 88y old  Male who presents with a chief complaint of cold L leg (20 May 2018 06:48)    Pt is S/P  L AKA       POD# 1  Extubated without difficulty and no acute events overnight  Feels well with minimal discomfort    Vital Signs Last 24 Hrs  T(C): 37.3 (22 May 2018 07:00), Max: 37.8 (21 May 2018 20:00)  T(F): 99.1 (22 May 2018 07:00), Max: 100 (21 May 2018 20:00)  HR: 80 (22 May 2018 07:00) (59 - 108)  BP: 146/68 (22 May 2018 05:00) (107/86 - 148/82)  BP(mean): 98 (22 May 2018 05:00) (82 - 100)  RR: 16 (22 May 2018 07:00) (13 - 30)  SpO2: 93% (22 May 2018 07:00) (93% - 100%)  I&O's Detail    21 May 2018 07:01  -  22 May 2018 07:00  --------------------------------------------------------  IN:    heparin  Infusion.: 30 mL    multiple electrolytes Injection Type 1: 2550 mL    multiple electrolytes Injection Type 1multiple electrolytes Injection Type 1: 1200 mL    Oral Fluid: 460 mL    Solution: 251.5 mL  Total IN: 4491.5 mL    OUT:    Indwelling Catheter - Urethral: 1495 mL  Total OUT: 1495 mL    Total NET: 2996.5 mL    MEDICATIONS  (STANDING):  aspirin  chewable 81 milliGRAM(s) Oral daily  atorvastatin 10 milliGRAM(s) Oral at bedtime  docusate sodium 100 milliGRAM(s) Oral three times a day  donepezil 10 milliGRAM(s) Oral at bedtime  gabapentin 300 milliGRAM(s) Oral three times a day  heparin  Injectable 5000 Unit(s) SubCutaneous every 8 hours  melatonin 5 milliGRAM(s) Oral at bedtime  multiple electrolytes Injection Type 1 1000 milliLiter(s) (150 mL/Hr) IV Continuous <Continuous>  pantoprazole    Tablet 40 milliGRAM(s) Oral before breakfast  senna 2 Tablet(s) Oral at bedtime    MEDICATIONS  (PRN):  HYDROmorphone  Injectable 0.5 milliGRAM(s) IV Push every 3 hours PRN Moderate Pain (4 - 6)  HYDROmorphone  Injectable 1 milliGRAM(s) IV Push every 3 hours PRN Severe Pain (7 - 10)      PAST MEDICAL & SURGICAL HISTORY:  DVT (deep venous thrombosis)  Cataract of both eyes  Prostate CA  AAA (abdominal aortic aneurysm)  Alzheimer disease  Hypertension  DVT (deep venous thrombosis)  AAA (abdominal aortic aneurysm): 5cm  Right iliac aneurysm 4cm  Alzheimer disease  Cataract  Prostate ca  Gallstones  History of cataract surgery  H/O prostatectomy  History of cholecystectomy  H/O prostatectomy: 1998  Status post cataract surgery: bilateral  History of cholecystectomy: 1997    Physical Exam:  General: NAD, resting comfortably in bed  Pulmonary: Nonlabored breathing, CTA  Cardiovascular: Normal S1, S2  Abdominal: soft, NT/ND  Extremities: L AKA dressing CDI, mod swelling thigh  Pulses:   Right:                                                                           FEM [X ]2+ [ ]1+ [ ]doppler                                               DP [ ]2+ [X ]1+ [ ]doppler                                                    LABS:                        10.8   11.1  )-----------( 136      ( 22 May 2018 05:13 )             33.9     05-22    140  |  104  |  17.0  ----------------------------<  156<H>  4.6   |  23.0  |  1.02    Ca    7.3<L>      22 May 2018 05:13  Phos  2.6     05-22  Mg     2.1     05-22      PT/INR - ( 21 May 2018 05:39 )   PT: 12.2 sec;   INR: 1.11 ratio    PTT - ( 22 May 2018 05:13 )  PTT:26.7 sec    Radiology and Additional Studies:  < from: CT Angio Abd Aorta w/run-off w/ IV Cont (05.20.18 @ 00:50) >  EXAM:  CT ANGIO ABD AOR W RUN(W)AW IC                          PROCEDURE DATE:  05/20/2018          INTERPRETATION:  [facility] RADIOLOGIST PRELIMINARY REPORT    EXAM:  CT Angiography Abdomen and Pelvis With Runoff to the Lower   Extremities   With Intravenous Contrast  CLINICAL HISTORY:  88 years old, male; Signs and symptoms; Numbness;   Lower   extremity; Left  TECHNIQUE:  Axial computed tomographic angiography images of the abdomen,   pelvis and lower extremities with intravenous contrast using CT   angiography   protocol.  MIP reconstructed images were created and reviewed.  Coronal   and   sagittal reformatted images were created and reviewed.  COMPARISON:  None currently available.      FINDINGS: Infrarenal abdominal aortic aneurysm measures 4.7 x 5.4 cm.  No   rupture.  Thick concentric mural thrombus lines the aneurysmal segment,   there   are small ulcerations in the thrombus.  Thin irregular mural thrombus   along the   visualized distal descending thoracic aorta and suprarenal abdominal   aorta.    The celiac trunk and its major branches are patent.  The SMA has a mild   stenosis at its origin, otherwise appears patent.  Mild to moderate left   renal   artery stenosis, the right renal artery is grossly patent.    Right leg: Aneurysmal change of the common iliac artery measuring 4.6 x   4.9 cm,   large crescent shape mural thrombus in the aneurysmal segment.  Mild   aneurysmal   change of the internal iliac artery measuring 1.3 cm.  The external iliac   artery is very tortuous with a mild stenosis at its origin.  The common   femoral, profunda femoral and superficial femoral arteries are widely   patent   with scattered calcifications.  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  A second smaller distal   popliteal   artery aneurysm with concentric thrombus measures 2.2 x 2.8 cm.  No   rupture of   either aneurysm.  Faint enhancement of the anterior and posterior tibial   and   peroneal arteries.  Multiple calcifications along these arteries.  The   dorsalis   pedis and plantar arteries also faintly enhance.  Subcutaneous edema of   the   foot, ankle and calf.    Left leg: Aneurysmal change of the common iliac artery measures 3.0 cm   across.    Aneurysmal change of the internal iliac artery measures 2.0 cm across.    The   external iliac, common femoral and profunda femoral arteries are widely   patent.    The lumen of the superficial femoral artery becomes quite irregular and   stenotic about 5-6 cm from its origin and eventually occludes at the   adductor   canal, uncertain if the irregular filling defects in the SFA lumen   represent   irregular mural thrombus versus embolic clot.  There is no reconstitution   of   distal arteries.  Aneurysmal change ofthe popliteal artery measures 4.2   x 5.0   cm.  Densely calcified trifurcation arteries.  No rupture of the   aneurysms.    Mild subcutaneous edema of the left calf and foot.    Small hiatal hernia.  No small bowel obstruction.  No appendix seen, no  secondary signs of appendicitis.  Diverticula in the distal descending   colon   and proximal sigmoid colon.  No diverticulitis.  Wall thickening of the   rectosigmoid.  No free fluid or free air.      Normal liver.  Cholecystectomy.      Normal pancreas.      Normal spleen.      Normal adrenal glands.  Irregular, lobulated kidneys.  Moderate left   renal   atrophy.  5 x 7 mm nonobstructing calcified stone in the mid left kidney.    Mild   left hydronephrosis, and no obstructing calcified stoneseen.  No right   hydronephrosis.Normal appearing bladder.  Prior prostate surgery,   multiple   surgical clips in the pelvis.    Degenerative changes and osteopenia.      Mild dependent atelectasis bilaterally.  Otherwise well-aerated lung   bases.    IMPRESSION:         Please see above discussion.  Multiple irregular filling defects lie in   the   lumen of the left superficial femoral artery, the left SFA occludes by   the   adductor canal.  There is no reconstitution of distal arteries of the   left leg,   concerning for severe arterial compromise to the left calf and foot.    Unknown   if the irregular filling defects in the left SFA represent irregular   mural   thrombus and severe vascular disease versus thromboemboli.  Vascular   surgical   consult recommended.    Aneurysmal change of the infrarenal abdominal aorta, the common iliac   arteries   bilaterally, the internal iliac arteries bilaterally and the popliteal   arteries   bilaterally as described above.  No rupture.    Mild left hydronephrosis, unknown etiology.    Diverticula in the distal descending and proximal sigmoid colon, no   diverticulitis.        Assessment: 88 year old male w/PMH of Alzheimers, prostate ca s/p prostatectomy transferred here from [place] with pulselessness, pain and numbness to [facility] that developed around 9pm . Patients family reports a recent diagnosis of DVT to [facility] and started Lovenox / coumadin therapy about 10 days ago with a recent INR of 2.3 a few days ago, however INR - 4.2 on repeat labs here . Lactate uptrending from 2.6 to 4.7 . Pt comes in on cardene gtt for SBP > 200 .      S/P  L AKA       POD# 1  R iliac and popliteal artery aneurysms  Infrarenal AAA    Plan:  Cont ASA and Statin  OOB/Ambulate/PT consult  Dressing change tomorrow  Maintain parra for now until more mobile to prevent wound contamination  May go to monitored floor Patient is a 88y old  Male who presents with a chief complaint of cold L leg (20 May 2018 06:48)    Pt is S/P  L AKA       POD# 1  Extubated without difficulty and no acute events overnight  Feels well with minimal discomfort    Vital Signs Last 24 Hrs  T(C): 37.3 (22 May 2018 07:00), Max: 37.8 (21 May 2018 20:00)  T(F): 99.1 (22 May 2018 07:00), Max: 100 (21 May 2018 20:00)  HR: 80 (22 May 2018 07:00) (59 - 108)  BP: 146/68 (22 May 2018 05:00) (107/86 - 148/82)  BP(mean): 98 (22 May 2018 05:00) (82 - 100)  RR: 16 (22 May 2018 07:00) (13 - 30)  SpO2: 93% (22 May 2018 07:00) (93% - 100%)  I&O's Detail    21 May 2018 07:01  -  22 May 2018 07:00  --------------------------------------------------------  IN:    heparin  Infusion.: 30 mL    multiple electrolytes Injection Type 1: 2550 mL    multiple electrolytes Injection Type 1multiple electrolytes Injection Type 1: 1200 mL    Oral Fluid: 460 mL    Solution: 251.5 mL  Total IN: 4491.5 mL    OUT:    Indwelling Catheter - Urethral: 1495 mL  Total OUT: 1495 mL    Total NET: 2996.5 mL    MEDICATIONS  (STANDING):  aspirin  chewable 81 milliGRAM(s) Oral daily  atorvastatin 10 milliGRAM(s) Oral at bedtime  docusate sodium 100 milliGRAM(s) Oral three times a day  donepezil 10 milliGRAM(s) Oral at bedtime  gabapentin 300 milliGRAM(s) Oral three times a day  heparin  Injectable 5000 Unit(s) SubCutaneous every 8 hours  melatonin 5 milliGRAM(s) Oral at bedtime  multiple electrolytes Injection Type 1 1000 milliLiter(s) (150 mL/Hr) IV Continuous <Continuous>  pantoprazole    Tablet 40 milliGRAM(s) Oral before breakfast  senna 2 Tablet(s) Oral at bedtime    MEDICATIONS  (PRN):  HYDROmorphone  Injectable 0.5 milliGRAM(s) IV Push every 3 hours PRN Moderate Pain (4 - 6)  HYDROmorphone  Injectable 1 milliGRAM(s) IV Push every 3 hours PRN Severe Pain (7 - 10)      PAST MEDICAL & SURGICAL HISTORY:  DVT (deep venous thrombosis)  Cataract of both eyes  Prostate CA  AAA (abdominal aortic aneurysm)  Alzheimer disease  Hypertension  DVT (deep venous thrombosis)  AAA (abdominal aortic aneurysm): 5cm  Right iliac aneurysm 4cm  Alzheimer disease  Cataract  Prostate ca  Gallstones  History of cataract surgery  H/O prostatectomy  History of cholecystectomy  H/O prostatectomy: 1998  Status post cataract surgery: bilateral  History of cholecystectomy: 1997    Physical Exam:  General: NAD, resting comfortably in bed  Pulmonary: Nonlabored breathing, CTA  Cardiovascular: Normal S1, S2  Abdominal: soft, NT/ND  Extremities: L AKA dressing CDI, mod swelling thigh  Pulses:   Right:                                                                           FEM [X ]2+ [ ]1+ [ ]doppler                                               DP [ ]2+ [X ]1+ [ ]doppler                                                    LABS:                        10.8   11.1  )-----------( 136      ( 22 May 2018 05:13 )             33.9     05-22    140  |  104  |  17.0  ----------------------------<  156<H>  4.6   |  23.0  |  1.02    Ca    7.3<L>      22 May 2018 05:13  Phos  2.6     05-22  Mg     2.1     05-22      PT/INR - ( 21 May 2018 05:39 )   PT: 12.2 sec;   INR: 1.11 ratio    PTT - ( 22 May 2018 05:13 )  PTT:26.7 sec    Radiology and Additional Studies:  < from: CT Angio Abd Aorta w/run-off w/ IV Cont (05.20.18 @ 00:50) >  EXAM:  CT ANGIO ABD AOR W RUN(W)AW IC                          PROCEDURE DATE:  05/20/2018          INTERPRETATION:  [facility] RADIOLOGIST PRELIMINARY REPORT    EXAM:  CT Angiography Abdomen and Pelvis With Runoff to the Lower   Extremities   With Intravenous Contrast  CLINICAL HISTORY:  88 years old, male; Signs and symptoms; Numbness;   Lower   extremity; Left  TECHNIQUE:  Axial computed tomographic angiography images of the abdomen,   pelvis and lower extremities with intravenous contrast using CT   angiography   protocol.  MIP reconstructed images were created and reviewed.  Coronal   and   sagittal reformatted images were created and reviewed.  COMPARISON:  None currently available.      FINDINGS: Infrarenal abdominal aortic aneurysm measures 4.7 x 5.4 cm.  No   rupture.  Thick concentric mural thrombus lines the aneurysmal segment,   there   are small ulcerations in the thrombus.  Thin irregular mural thrombus   along the   visualized distal descending thoracic aorta and suprarenal abdominal   aorta.    The celiac trunk and its major branches are patent.  The SMA has a mild   stenosis at its origin, otherwise appears patent.  Mild to moderate left   renal   artery stenosis, the right renal artery is grossly patent.    Right leg: Aneurysmal change of the common iliac artery measuring 4.6 x   4.9 cm,   large crescent shape mural thrombus in the aneurysmal segment.  Mild   aneurysmal   change of the internal iliac artery measuring 1.3 cm.  The external iliac   artery is very tortuous with a mild stenosis at its origin.  The common   femoral, profunda femoral and superficial femoral arteries are widely   patent   with scattered calcifications.  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  A second smaller distal   popliteal   artery aneurysm with concentric thrombus measures 2.2 x 2.8 cm.  No   rupture of   either aneurysm.  Faint enhancement of the anterior and posterior tibial   and   peroneal arteries.  Multiple calcifications along these arteries.  The   dorsalis   pedis and plantar arteries also faintly enhance.  Subcutaneous edema of   the   foot, ankle and calf.    Left leg: Aneurysmal change of the common iliac artery measures 3.0 cm   across.    Aneurysmal change of the internal iliac artery measures 2.0 cm across.    The   external iliac, common femoral and profunda femoral arteries are widely   patent.    The lumen of the superficial femoral artery becomes quite irregular and   stenotic about 5-6 cm from its origin and eventually occludes at the   adductor   canal, uncertain if the irregular filling defects in the SFA lumen   represent   irregular mural thrombus versus embolic clot.  There is no reconstitution   of   distal arteries.  Aneurysmal change ofthe popliteal artery measures 4.2   x 5.0   cm.  Densely calcified trifurcation arteries.  No rupture of the   aneurysms.    Mild subcutaneous edema of the left calf and foot.    Small hiatal hernia.  No small bowel obstruction.  No appendix seen, no  secondary signs of appendicitis.  Diverticula in the distal descending   colon   and proximal sigmoid colon.  No diverticulitis.  Wall thickening of the   rectosigmoid.  No free fluid or free air.      Normal liver.  Cholecystectomy.      Normal pancreas.      Normal spleen.      Normal adrenal glands.  Irregular, lobulated kidneys.  Moderate left   renal   atrophy.  5 x 7 mm nonobstructing calcified stone in the mid left kidney.    Mild   left hydronephrosis, and no obstructing calcified stoneseen.  No right   hydronephrosis.Normal appearing bladder.  Prior prostate surgery,   multiple   surgical clips in the pelvis.    Degenerative changes and osteopenia.      Mild dependent atelectasis bilaterally.  Otherwise well-aerated lung   bases.    IMPRESSION:         Please see above discussion.  Multiple irregular filling defects lie in   the   lumen of the left superficial femoral artery, the left SFA occludes by   the   adductor canal.  There is no reconstitution of distal arteries of the   left leg,   concerning for severe arterial compromise to the left calf and foot.    Unknown   if the irregular filling defects in the left SFA represent irregular   mural   thrombus and severe vascular disease versus thromboemboli.  Vascular   surgical   consult recommended.    Aneurysmal change of the infrarenal abdominal aorta, the common iliac   arteries   bilaterally, the internal iliac arteries bilaterally and the popliteal   arteries   bilaterally as described above.  No rupture.    Mild left hydronephrosis, unknown etiology.    Diverticula in the distal descending and proximal sigmoid colon, no   diverticulitis.        Assessment: 88 year old male w/PMH of Alzheimers, prostate ca s/p prostatectomy transferred here from [place] with pulselessness, pain and numbness to [facility] that developed around 9pm . Patients family reports a recent diagnosis of DVT to [facility] and started Lovenox / coumadin therapy about 10 days ago with a recent INR of 2.3 a few days ago, however INR - 4.2 on repeat labs here . Lactate uptrending from 2.6 to 4.7 . Pt comes in on cardene gtt for SBP > 200 .      S/P  L AKA       POD# 1  R iliac and popliteal artery aneurysms  Infrarenal AAA    Plan:  Cont ASA and Statin  OOB/Ambulate/PT consult  Dressing change tomorrow  Maintain parra for now until more mobile to prevent wound contamination  May go to monitored floor  Will get venous duplex of LLE with h/o DVT(unknown where) to R/O L fem DVT. If + will start heparin gtt for AC  Will need further intervention of aneurysm and likely infrarenal AAA as well  Discussed with Dr Fry

## 2018-05-22 NOTE — PROGRESS NOTE ADULT - SUBJECTIVE AND OBJECTIVE BOX
INTERVAL HPI/OVERNIGHT EVENTS:  s/p left aka , resolving rhabdo.    PRESSORS: [ ] YES x[ ] NO  WHICH:  DOSE:    ANTIBIOTICS:       x           DATE STARTED:  ANTIBIOTICS:                  DATE STARTED:  ANTIBIOTICS:                  DATE STARTED:    MEDICATIONS  (STANDING):  aspirin  chewable 81 milliGRAM(s) Oral daily  atorvastatin 10 milliGRAM(s) Oral at bedtime  docusate sodium 100 milliGRAM(s) Oral three times a day  donepezil 10 milliGRAM(s) Oral at bedtime  gabapentin 300 milliGRAM(s) Oral three times a day  heparin  Injectable 5000 Unit(s) SubCutaneous every 8 hours  lisinopril 5 milliGRAM(s) Oral daily  melatonin 5 milliGRAM(s) Oral at bedtime  multiple electrolytes Injection Type 1 1000 milliLiter(s) (100 mL/Hr) IV Continuous <Continuous>  pantoprazole    Tablet 40 milliGRAM(s) Oral before breakfast  senna 2 Tablet(s) Oral at bedtime    MEDICATIONS  (PRN):  HYDROmorphone  Injectable 0.5 milliGRAM(s) IV Push every 3 hours PRN Moderate Pain (4 - 6)  HYDROmorphone  Injectable 1 milliGRAM(s) IV Push every 3 hours PRN Severe Pain (7 - 10)      Drug Dosing Weight  Height (cm): 180.34 (19 May 2018 23:36)  Weight (kg): 89.8 (19 May 2018 23:36)  BMI (kg/m2): 27.6 (19 May 2018 23:36)  BSA (m2): 2.1 (19 May 2018 23:36)    CENTRAL LINE: [ ] YES [ x] NO  LOCATION:   DATE INSERTED:  REMOVE: [ ] YES [ ] NO  EXPLAIN:    PARRA: [x ] YES [ ] NO    DATE INSERTED:  REMOVE: [ ] YES [ ] NO  EXPLAIN:    A-LINE: [ ] YES [ x] NO  LOCATION:   DATE INSERTED:  REMOVE: [ ] YES [ ] NO  EXPLAIN:    PAST MEDICAL & SURGICAL HISTORY:  DVT (deep venous thrombosis)  Cataract of both eyes  Prostate CA  AAA (abdominal aortic aneurysm)  Alzheimer disease  Hypertension  DVT (deep venous thrombosis)  AAA (abdominal aortic aneurysm): 5cm  Right iliac aneurysm 4cm  Alzheimer disease  Cataract  Prostate ca  Gallstones  History of cataract surgery  H/O prostatectomy  History of cholecystectomy  H/O prostatectomy: 1998  Status post cataract surgery: bilateral  History of cholecystectomy: 1997      ICU Vital Signs Last 24 Hrs  T(C): 36.9 (22 May 2018 12:00), Max: 37.8 (21 May 2018 20:00)  T(F): 98.4 (22 May 2018 12:00), Max: 100 (21 May 2018 20:00)  HR: 88 (22 May 2018 13:00) (59 - 101)  BP: 140/67 (22 May 2018 13:00) (119/82 - 146/79)  BP(mean): 96 (22 May 2018 13:00) (82 - 98)  ABP: 159/61 (22 May 2018 13:00) (106/41 - 175/76)  ABP(mean): 98 (22 May 2018 13:00) (63 - 115)  RR: 22 (22 May 2018 13:00) (13 - 30)  SpO2: 94% (22 May 2018 13:00) (93% - 98%)          I&O's Detail    21 May 2018 07:01  -  22 May 2018 07:00  --------------------------------------------------------  IN:    heparin  Infusion.: 30 mL    multiple electrolytes Injection Type 1multiple electrolytes Injection Type 1: 1200 mL    multiple electrolytes Injection Type 1multiple electrolytes Injection Type 1: 2550 mL    Oral Fluid: 460 mL    Solution: 251.5 mL  Total IN: 4491.5 mL    OUT:    Indwelling Catheter - Urethral: 1495 mL  Total OUT: 1495 mL    Total NET: 2996.5 mL      22 May 2018 07:01  -  22 May 2018 14:47  --------------------------------------------------------  IN:    multiple electrolytes Injection Type 1: 400 mL    multiple electrolytes Injection Type 1multiple electrolytes Injection Type 1: 300 mL    Solution: 250 mL  Total IN: 950 mL    OUT:    Indwelling Catheter - Urethral: 225 mL  Total OUT: 225 mL    Total NET: 725 mL              Physical Exam:    Neurological:  No sensory/motor deficits    Respiratory: Breath Sounds equal & clear to auscultation on room air.    Cardiovascular: irregular.    Gastrointestinal: Soft, non-tender, normal bowel sounds    Extremities: left aka site is clean dry intact     Vascular: + dopplerable rle pulse    Musculoskeletal: No joint pain, swelling or deformity; no limitation of movement    Skin: No rashes    LABS:  CBC Full  -  ( 22 May 2018 05:13 )  WBC Count : 11.1 K/uL  Hemoglobin : 10.8 g/dL  Hematocrit : 33.9 %  Platelet Count - Automated : 136 K/uL  Mean Cell Volume : 99.1 fl  Mean Cell Hemoglobin : 31.6 pg  Mean Cell Hemoglobin Concentration : 31.9 g/dL  Auto Neutrophil # : 8.8 K/uL  Auto Lymphocyte # : 0.8 K/uL  Auto Monocyte # : 1.5 K/uL  Auto Eosinophil # : 0.0 K/uL  Auto Basophil # : x  Auto Neutrophil % : 79.2 %  Auto Lymphocyte % : 7.5 %  Auto Monocyte % : 13.1 %  Auto Eosinophil % : 0.0 %  Auto Basophil % : x    05-22    140  |  104  |  17.0  ----------------------------<  156<H>  4.6   |  23.0  |  1.02    Ca    7.3<L>      22 May 2018 05:13  Phos  2.6     05-22  Mg     2.1     05-22      PT/INR - ( 21 May 2018 05:39 )   PT: 12.2 sec;   INR: 1.11 ratio         PTT - ( 22 May 2018 05:13 )  PTT:26.7 sec      Assessment and Plan:    Neuro: Monitor for delirium.  Continue to optimize pain control.    CV: Continue hemodynamic monitoring    Pulm: Pulmonary toilet.  Continue incentive spirometer.  Chest PT.  Encourage OOB to chair and ambulation     GI/Nutrition: Bowel Regimen    /Renal: rhado improved, d/c parra , need to still monitor i/o's. decrease ivf rate to 100cc/hour. serial check cpk until <1000.    HEME- DVT prophylaxis, SCDs, asa as per vascular, f/u u/s b/l le to r/o dvt.      Dispo: sicu until rhabdo resolves , then asf to vascular service.

## 2018-05-22 NOTE — DIETITIAN INITIAL EVALUATION ADULT. - OTHER INFO
Pt is POD #1 of AKA on left leg. Pt's family members present at bedside and will help feed him. Pt wears upper dentures and there are no complaints of chewing difficulties. Pt was eating well PTA and has been eating well on his current diet order. As per H&P note, pt's weight was 184# on most recent hospital admission in 2015. Meal preferences and menu options discussed with pt's family members. Pt is POD #1 of AKA on left leg. Pt's family members present at bedside and will help feed him. Pt wears upper dentures and there are no complaints of chewing difficulties with softer foods such as sandwiches.  Pt was eating well PTA and has been eating well on his current diet order. As per H&P note, pt's weight was 184# on most recent hospital admission in 2015. Meal preferences and menu options discussed with pt's family members.

## 2018-05-22 NOTE — DIETITIAN INITIAL EVALUATION ADULT. - PERTINENT LABORATORY DATA
05-22 Na140 mmol/L Glu 156 mg/dL<H> K+ 4.6 mmol/L Cr  1.02 mg/dL BUN 17.0 mg/dL Phos 2.6 mg/dL Alb n/a   PAB n/a

## 2018-05-22 NOTE — CHART NOTE - NSCHARTNOTEFT_GEN_A_CORE
US reviewed....preliminarily no evidence of L DVT  Dressing was removed and L AKA incision with staples intact; no drainage and/or erythema  Re wrapped with DSD and ACE  Will discuss findings with Dr Fry

## 2018-05-23 LAB
ANION GAP SERPL CALC-SCNC: 12 MMOL/L — SIGNIFICANT CHANGE UP (ref 5–17)
BASOPHILS # BLD AUTO: 0 K/UL — SIGNIFICANT CHANGE UP (ref 0–0.2)
BASOPHILS NFR BLD AUTO: 0.2 % — SIGNIFICANT CHANGE UP (ref 0–2)
BUN SERPL-MCNC: 14 MG/DL — SIGNIFICANT CHANGE UP (ref 8–20)
CALCIUM SERPL-MCNC: 7.3 MG/DL — LOW (ref 8.6–10.2)
CHLORIDE SERPL-SCNC: 102 MMOL/L — SIGNIFICANT CHANGE UP (ref 98–107)
CK MB CFR SERPL CALC: 7.4 NG/ML — HIGH (ref 0–6.7)
CK SERPL-CCNC: 4580 U/L — HIGH (ref 30–200)
CO2 SERPL-SCNC: 23 MMOL/L — SIGNIFICANT CHANGE UP (ref 22–29)
CREAT SERPL-MCNC: 0.86 MG/DL — SIGNIFICANT CHANGE UP (ref 0.5–1.3)
EOSINOPHIL # BLD AUTO: 0 K/UL — SIGNIFICANT CHANGE UP (ref 0–0.5)
EOSINOPHIL NFR BLD AUTO: 0.4 % — SIGNIFICANT CHANGE UP (ref 0–6)
GLUCOSE SERPL-MCNC: 109 MG/DL — SIGNIFICANT CHANGE UP (ref 70–115)
HCT VFR BLD CALC: 30.8 % — LOW (ref 42–52)
HGB BLD-MCNC: 9.9 G/DL — LOW (ref 14–18)
LYMPHOCYTES # BLD AUTO: 1.7 K/UL — SIGNIFICANT CHANGE UP (ref 1–4.8)
LYMPHOCYTES # BLD AUTO: 16.6 % — LOW (ref 20–55)
MAGNESIUM SERPL-MCNC: 1.9 MG/DL — SIGNIFICANT CHANGE UP (ref 1.6–2.6)
MCHC RBC-ENTMCNC: 32 PG — HIGH (ref 27–31)
MCHC RBC-ENTMCNC: 32.1 G/DL — SIGNIFICANT CHANGE UP (ref 32–36)
MCV RBC AUTO: 99.7 FL — HIGH (ref 80–94)
MONOCYTES # BLD AUTO: 1.4 K/UL — HIGH (ref 0–0.8)
MONOCYTES NFR BLD AUTO: 14.1 % — HIGH (ref 3–10)
NEUTROPHILS # BLD AUTO: 6.9 K/UL — SIGNIFICANT CHANGE UP (ref 1.8–8)
NEUTROPHILS NFR BLD AUTO: 68.4 % — SIGNIFICANT CHANGE UP (ref 37–73)
PHOSPHATE SERPL-MCNC: 2.2 MG/DL — LOW (ref 2.4–4.7)
PLATELET # BLD AUTO: 147 K/UL — LOW (ref 150–400)
POTASSIUM SERPL-MCNC: 4.2 MMOL/L — SIGNIFICANT CHANGE UP (ref 3.5–5.3)
POTASSIUM SERPL-SCNC: 4.2 MMOL/L — SIGNIFICANT CHANGE UP (ref 3.5–5.3)
RBC # BLD: 3.09 M/UL — LOW (ref 4.6–6.2)
RBC # FLD: 13.3 % — SIGNIFICANT CHANGE UP (ref 11–15.6)
SODIUM SERPL-SCNC: 137 MMOL/L — SIGNIFICANT CHANGE UP (ref 135–145)
WBC # BLD: 10.1 K/UL — SIGNIFICANT CHANGE UP (ref 4.8–10.8)
WBC # FLD AUTO: 10.1 K/UL — SIGNIFICANT CHANGE UP (ref 4.8–10.8)

## 2018-05-23 PROCEDURE — 74018 RADEX ABDOMEN 1 VIEW: CPT | Mod: 26

## 2018-05-23 RX ORDER — MINERAL OIL
133 OIL (ML) MISCELLANEOUS ONCE
Qty: 0 | Refills: 0 | Status: COMPLETED | OUTPATIENT
Start: 2018-05-23 | End: 2018-05-23

## 2018-05-23 RX ORDER — MINERAL OIL
133 OIL (ML) MISCELLANEOUS EVERY 4 HOURS
Qty: 0 | Refills: 0 | Status: COMPLETED | OUTPATIENT
Start: 2018-05-23 | End: 2018-05-24

## 2018-05-23 RX ORDER — MINERAL OIL
133 OIL (ML) MISCELLANEOUS EVERY 6 HOURS
Qty: 0 | Refills: 0 | Status: DISCONTINUED | OUTPATIENT
Start: 2018-05-23 | End: 2018-05-23

## 2018-05-23 RX ORDER — MINERAL OIL
133 OIL (ML) MISCELLANEOUS EVERY 4 HOURS
Qty: 0 | Refills: 0 | Status: DISCONTINUED | OUTPATIENT
Start: 2018-05-23 | End: 2018-05-23

## 2018-05-23 RX ADMIN — Medication 5 MILLIGRAM(S): at 22:08

## 2018-05-23 RX ADMIN — Medication 133 MILLILITER(S): at 15:59

## 2018-05-23 RX ADMIN — HEPARIN SODIUM 5000 UNIT(S): 5000 INJECTION INTRAVENOUS; SUBCUTANEOUS at 13:17

## 2018-05-23 RX ADMIN — Medication 81 MILLIGRAM(S): at 13:17

## 2018-05-23 RX ADMIN — GABAPENTIN 300 MILLIGRAM(S): 400 CAPSULE ORAL at 13:17

## 2018-05-23 RX ADMIN — SENNA PLUS 2 TABLET(S): 8.6 TABLET ORAL at 22:08

## 2018-05-23 RX ADMIN — PANTOPRAZOLE SODIUM 40 MILLIGRAM(S): 20 TABLET, DELAYED RELEASE ORAL at 06:01

## 2018-05-23 RX ADMIN — ATORVASTATIN CALCIUM 10 MILLIGRAM(S): 80 TABLET, FILM COATED ORAL at 22:07

## 2018-05-23 RX ADMIN — HEPARIN SODIUM 5000 UNIT(S): 5000 INJECTION INTRAVENOUS; SUBCUTANEOUS at 22:07

## 2018-05-23 RX ADMIN — Medication 100 MILLIGRAM(S): at 05:53

## 2018-05-23 RX ADMIN — GABAPENTIN 300 MILLIGRAM(S): 400 CAPSULE ORAL at 05:53

## 2018-05-23 RX ADMIN — HEPARIN SODIUM 5000 UNIT(S): 5000 INJECTION INTRAVENOUS; SUBCUTANEOUS at 05:52

## 2018-05-23 RX ADMIN — Medication 100 MILLIGRAM(S): at 13:17

## 2018-05-23 RX ADMIN — Medication 133 MILLILITER(S): at 09:25

## 2018-05-23 RX ADMIN — GABAPENTIN 300 MILLIGRAM(S): 400 CAPSULE ORAL at 22:07

## 2018-05-23 RX ADMIN — Medication 133 MILLILITER(S): at 20:57

## 2018-05-23 RX ADMIN — HYDROMORPHONE HYDROCHLORIDE 0.5 MILLIGRAM(S): 2 INJECTION INTRAMUSCULAR; INTRAVENOUS; SUBCUTANEOUS at 06:04

## 2018-05-23 RX ADMIN — Medication 100 MILLIGRAM(S): at 22:07

## 2018-05-23 RX ADMIN — DONEPEZIL HYDROCHLORIDE 10 MILLIGRAM(S): 10 TABLET, FILM COATED ORAL at 22:08

## 2018-05-23 RX ADMIN — Medication 62.5 MILLIMOLE(S): at 09:26

## 2018-05-23 RX ADMIN — HYDROMORPHONE HYDROCHLORIDE 0.5 MILLIGRAM(S): 2 INJECTION INTRAMUSCULAR; INTRAVENOUS; SUBCUTANEOUS at 06:18

## 2018-05-23 RX ADMIN — SODIUM CHLORIDE 100 MILLILITER(S): 9 INJECTION, SOLUTION INTRAVENOUS at 06:10

## 2018-05-23 RX ADMIN — Medication 10 MILLIGRAM(S): at 06:18

## 2018-05-23 RX ADMIN — LISINOPRIL 5 MILLIGRAM(S): 2.5 TABLET ORAL at 05:53

## 2018-05-23 NOTE — PROGRESS NOTE ADULT - SUBJECTIVE AND OBJECTIVE BOX
Patient is a 88y old  Male who presents with a chief complaint of cold L leg (20 May 2018 06:48)    Pt is S/P  L AKA       POD# 2  No acute events overnight  Feels well with minimal discomfort    ICU Vital Signs Last 24 Hrs  T(C): 37.6 (23 May 2018 07:00), Max: 38 (22 May 2018 19:33)  T(F): 99.7 (23 May 2018 07:00), Max: 100.4 (22 May 2018 19:33)  HR: 81 (23 May 2018 07:00) (66 - 101)  BP: 158/72 (22 May 2018 21:00) (130/72 - 190/86)  BP(mean): 104 (22 May 2018 21:00) (96 - 123)  ABP: 152/54 (23 May 2018 07:00) (152/54 - 186/69)  ABP(mean): 89 (23 May 2018 07:00) (84 - 131)  RR: 13 (23 May 2018 07:00) (13 - 27)  SpO2: 96% (23 May 2018 07:00) (92% - 97%)  I&O's Summary    22 May 2018 07:01  -  23 May 2018 07:00  --------------------------------------------------------  IN: 3050 mL / OUT: 1550 mL / NET: 1500 mL      MEDICATIONS  (STANDING):  aspirin  chewable 81 milliGRAM(s) Oral daily  atorvastatin 10 milliGRAM(s) Oral at bedtime  docusate sodium 100 milliGRAM(s) Oral three times a day  donepezil 10 milliGRAM(s) Oral at bedtime  gabapentin 300 milliGRAM(s) Oral three times a day  heparin  Injectable 5000 Unit(s) SubCutaneous every 8 hours  lisinopril 5 milliGRAM(s) Oral daily  melatonin 5 milliGRAM(s) Oral at bedtime  mineral oil enema 133 milliLiter(s) Rectal once  multiple electrolytes Injection Type 1 1000 milliLiter(s) (100 mL/Hr) IV Continuous <Continuous>  pantoprazole    Tablet 40 milliGRAM(s) Oral before breakfast  senna 2 Tablet(s) Oral at bedtime  sodium phosphate IVPB 15 milliMole(s) IV Intermittent once    MEDICATIONS  (PRN):  HYDROmorphone  Injectable 0.5 milliGRAM(s) IV Push every 3 hours PRN Moderate Pain (4 - 6)  HYDROmorphone  Injectable 1 milliGRAM(s) IV Push every 3 hours PRN Severe Pain (7 - 10)    PAST MEDICAL & SURGICAL HISTORY:  DVT (deep venous thrombosis)  Cataract of both eyes  Prostate CA  AAA (abdominal aortic aneurysm)  Alzheimer disease  Hypertension  DVT (deep venous thrombosis)  AAA (abdominal aortic aneurysm): 5cm  Right iliac aneurysm 4cm  Alzheimer disease  Cataract  Prostate ca  Gallstones  History of cataract surgery  H/O prostatectomy  History of cholecystectomy  H/O prostatectomy: 1998  Status post cataract surgery: bilateral  History of cholecystectomy: 1997    Physical Exam:  General: NAD, resting comfortably in bed  Pulmonary: Nonlabored breathing, CTA  Cardiovascular: Normal S1, S2  Abdominal: Distended, tympanic, NT, +hypo BS, no BM  Extremities: L AKA dressing CDI, swelling improved L thigh                                                   LABS:                        9.9    10.1  )-----------( 147      ( 23 May 2018 05:58 )             30.8   05-23    137  |  102  |  14.0  ----------------------------<  109  4.2   |  23.0  |  0.86    Ca    7.3<L>      23 May 2018 05:58  Phos  2.2     05-23  Mg     1.9     05-23    PTT - ( 22 May 2018 05:13 )  PTT:26.7 sec     Radiology and Additional Studies:  < from: CT Angio Abd Aorta w/run-off w/ IV Cont (05.20.18 @ 00:50) >  EXAM:  CT ANGIO ABD AOR W RUN(W)AW IC                          PROCEDURE DATE:  05/20/2018    EXAM:  CT Angiography Abdomen and Pelvis With Runoff to the Lower   Extremities   With Intravenous Contrast  CLINICAL HISTORY:  88 years old, male; Signs and symptoms; Numbness;   Lower   extremity; Left  TECHNIQUE:  Axial computed tomographic angiography images of the abdomen,   pelvis and lower extremities with intravenous contrast using CT   angiography   protocol.  MIP reconstructed images were created and reviewed.  Coronal   and   sagittal reformatted images were created and reviewed.  COMPARISON:  None currently available.      FINDINGS: Infrarenal abdominal aortic aneurysm measures 4.7 x 5.4 cm.  No   rupture.  Thick concentric mural thrombus lines the aneurysmal segment,   there   are small ulcerations in the thrombus.  Thin irregular mural thrombus   along the   visualized distal descending thoracic aorta and suprarenal abdominal   aorta.    The celiac trunk and its major branches are patent.  The SMA has a mild   stenosis at its origin, otherwise appears patent.  Mild to moderate left   renal   artery stenosis, the right renal artery is grossly patent.    Right leg: Aneurysmal change of the common iliac artery measuring 4.6 x   4.9 cm,   large crescent shape mural thrombus in the aneurysmal segment.  Mild   aneurysmal   change of the internal iliac artery measuring 1.3 cm.  The external iliac   artery is very tortuous with a mild stenosis at its origin.  The common   femoral, profunda femoral and superficial femoral arteries are widely   patent   with scattered calcifications.  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  A second smaller distal   popliteal   artery aneurysm with concentric thrombus measures 2.2 x 2.8 cm.  No   rupture of   either aneurysm.  Faint enhancement of the anterior and posterior tibial   and   peroneal arteries.  Multiple calcifications along these arteries.  The   dorsalis   pedis and plantar arteries also faintly enhance.  Subcutaneous edema of   the   foot, ankle and calf.    Left leg: Aneurysmal change of the common iliac artery measures 3.0 cm   across.    Aneurysmal change of the internal iliac artery measures 2.0 cm across.    The   external iliac, common femoral and profunda femoral arteries are widely   patent.    The lumen of the superficial femoral artery becomes quite irregular and   stenotic about 5-6 cm from its origin and eventually occludes at the   adductor   canal, uncertain if the irregular filling defects in the SFA lumen   represent   irregular mural thrombus versus embolic clot.  There is no reconstitution   of   distal arteries.  Aneurysmal change ofthe popliteal artery measures 4.2   x 5.0   cm.  Densely calcified trifurcation arteries.  No rupture of the   aneurysms.    Mild subcutaneous edema of the left calf and foot.    Small hiatal hernia.  No small bowel obstruction.  No appendix seen, no  secondary signs of appendicitis.  Diverticula in the distal descending   colon   and proximal sigmoid colon.  No diverticulitis.  Wall thickening of the   rectosigmoid.  No free fluid or free air.      Normal liver.  Cholecystectomy.      Normal pancreas.      Normal spleen.      Normal adrenal glands.  Irregular, lobulated kidneys.  Moderate left   renal   atrophy.  5 x 7 mm nonobstructing calcified stone in the mid left kidney.    Mild   left hydronephrosis, and no obstructing calcified stoneseen.  No right   hydronephrosis.Normal appearing bladder.  Prior prostate surgery,   multiple   surgical clips in the pelvis.    Degenerative changes and osteopenia.      Mild dependent atelectasis bilaterally.  Otherwise well-aerated lung   bases.    IMPRESSION:         Please see above discussion.  Multiple irregular filling defects lie in   the   lumen of the left superficial femoral artery, the left SFA occludes by   the   adductor canal.  There is no reconstitution of distal arteries of the   left leg,   concerning for severe arterial compromise to the left calf and foot.    Unknown   if the irregular filling defects in the left SFA represent irregular   mural   thrombus and severe vascular disease versus thromboemboli.  Vascular   surgical   consult recommended.    Aneurysmal change of the infrarenal abdominal aorta, the common iliac   arteries   bilaterally, the internal iliac arteries bilaterally and the popliteal   arteries   bilaterally as described above.  No rupture.    Mild left hydronephrosis, unknown etiology.    Diverticula in the distal descending and proximal sigmoid colon, no   diverticulitis.      EXAM:  US DPLX LWR EXT VEINS LTD LT                          PROCEDURE DATE:  05/22/2018      INTERPRETATION:  CLINICAL INFORMATION: Status post above-knee amputation   with left eye swelling evaluate for femoral vein thrombosis. COMPARISON:   None available.    TECHNIQUE: Duplex sonography of the LEFT LOWER extremity with color and   spectral Doppler, with and without compression.      FINDINGS:    There is normal compressibility of the left common femoral, femoral.   Greater saphenous vein patent.        Doppler examination shows normal spontaneous and phasic flow.    IMPRESSION:     No evidence of left lower extremity deep venous thrombosis.        Assessment: 88 year old male w/PMH of Alzheimers, prostate ca s/p prostatectomy transferred here from [place] with pulselessness, pain and numbness to [facility] that developed around 9pm . Patients family reports a recent diagnosis of DVT to [facility] and started Lovenox / coumadin therapy about 10 days ago with a recent INR of 2.3 a few days ago, however INR - 4.2 on repeat labs here . Lactate uptrending from 2.6 to 4.7 . Pt comes in on cardene gtt for SBP > 200 .      S/P  L AKA       POD# 2  R iliac and popliteal artery aneurysms  Infrarenal AAA  Abd distended ? ileus    Plan:  Cont ASA and Statin  OOB/Ambulate/PT consult  Daily dressing changes DSD  Maintain parra for now until more mobile to prevent wound contamination  May go to monitored floor  Will get KUB to eval for possible ileus; bowel regimen in place  Will need further intervention of aneurysm and likely infrarenal AAA as well  Discussed with Dr Fry

## 2018-05-23 NOTE — PROGRESS NOTE ADULT - SUBJECTIVE AND OBJECTIVE BOX
INTERVAL HPI/OVERNIGHT EVENTS:  developed abd distention overnight. maintained good urine output.     PRESSORS: [ ] YES [x ] NO  WHICH:  DOSE:    ANTIBIOTICS:              x    DATE STARTED:  ANTIBIOTICS:                  DATE STARTED:  ANTIBIOTICS:                  DATE STARTED:    MEDICATIONS  (STANDING):  aspirin  chewable 81 milliGRAM(s) Oral daily  atorvastatin 10 milliGRAM(s) Oral at bedtime  docusate sodium 100 milliGRAM(s) Oral three times a day  donepezil 10 milliGRAM(s) Oral at bedtime  gabapentin 300 milliGRAM(s) Oral three times a day  heparin  Injectable 5000 Unit(s) SubCutaneous every 8 hours  lisinopril 5 milliGRAM(s) Oral daily  melatonin 5 milliGRAM(s) Oral at bedtime  mineral oil enema 133 milliLiter(s) Rectal every 4 hours  multiple electrolytes Injection Type 1 1000 milliLiter(s) (100 mL/Hr) IV Continuous <Continuous>  pantoprazole    Tablet 40 milliGRAM(s) Oral before breakfast  senna 2 Tablet(s) Oral at bedtime    MEDICATIONS  (PRN):  HYDROmorphone  Injectable 0.5 milliGRAM(s) IV Push every 3 hours PRN Moderate Pain (4 - 6)  HYDROmorphone  Injectable 1 milliGRAM(s) IV Push every 3 hours PRN Severe Pain (7 - 10)      Drug Dosing Weight  Height (cm): 180.34 (19 May 2018 23:36)  Weight (kg): 89.8 (19 May 2018 23:36)  BMI (kg/m2): 27.6 (19 May 2018 23:36)  BSA (m2): 2.1 (19 May 2018 23:36)    CENTRAL LINE: [ ] YES [x ] NO  LOCATION:   DATE INSERTED:  REMOVE: [ ] YES [ ] NO  EXPLAIN:    MICHAELS: [x ] YES [ ] NO    DATE INSERTED:  REMOVE: [ ] YES [x] NO  EXPLAIN: monitoring rhabdo need strict i/o monitoring.     A-LINE: [ ] YES [x ] NO  LOCATION:   DATE INSERTED:  REMOVE: [ ] YES [ ] NO  EXPLAIN:    PAST MEDICAL & SURGICAL HISTORY:  DVT (deep venous thrombosis)  Cataract of both eyes  Prostate CA  AAA (abdominal aortic aneurysm)  Alzheimer disease  Hypertension  DVT (deep venous thrombosis)  AAA (abdominal aortic aneurysm): 5cm  Right iliac aneurysm 4cm  Alzheimer disease  Cataract  Prostate ca  Gallstones  History of cataract surgery  H/O prostatectomy  History of cholecystectomy  H/O prostatectomy: 1998  Status post cataract surgery: bilateral  History of cholecystectomy: 1997      ICU Vital Signs Last 24 Hrs  T(C): 37.7 (23 May 2018 12:00), Max: 38 (22 May 2018 19:33)  T(F): 99.9 (23 May 2018 12:00), Max: 100.4 (22 May 2018 19:33)  HR: 75 (23 May 2018 15:00) (72 - 101)  BP: 148/66 (23 May 2018 15:00) (146/65 - 190/86)  BP(mean): 95 (23 May 2018 15:00) (93 - 123)  ABP: 100/86 (23 May 2018 15:00) (94/73 - 186/69)  ABP(mean): 95 (23 May 2018 15:00) (78 - 131)  RR: 20 (23 May 2018 15:00) (13 - 27)  SpO2: 98% (23 May 2018 15:00) (92% - 98%)          I&O's Detail    22 May 2018 07:01  -  23 May 2018 07:00  --------------------------------------------------------  IN:    multiple electrolytes Injection Type 1: 2200 mL    multiple electrolytes Injection Type 1multiple electrolytes Injection Type 1: 300 mL    Oral Fluid: 300 mL    Solution: 250 mL  Total IN: 3050 mL    OUT:    Indwelling Catheter - Urethral: 1550 mL  Total OUT: 1550 mL    Total NET: 1500 mL      23 May 2018 07:01  -  23 May 2018 16:11  --------------------------------------------------------  IN:    multiple electrolytes Injection Type 1: 900 mL    Solution: 250 mL  Total IN: 1150 mL    OUT:    Indwelling Catheter - Urethral: 810 mL  Total OUT: 810 mL    Total NET: 340 mL              Physical Exam:    Neurological:  baseline confused. but alert .      Respiratory: Breath Sounds equal & clear to auscultation, no accessory muscle use    Cardiovascular: irregular rhythm      Gastrointestinal: Soft, +distended, +tympany , no ttp noted.    Extremities: No peripheral edema, No cyanosis, clubbing     Vascular: Equal and normal pulses: 2+ peripheral pulses throughout    Musculoskeletal: left lower ext surgical site c/d/i.     Skin: No rashes    LABS:  CBC Full  -  ( 23 May 2018 05:58 )  WBC Count : 10.1 K/uL  Hemoglobin : 9.9 g/dL  Hematocrit : 30.8 %  Platelet Count - Automated : 147 K/uL  Mean Cell Volume : 99.7 fl  Mean Cell Hemoglobin : 32.0 pg  Mean Cell Hemoglobin Concentration : 32.1 g/dL  Auto Neutrophil # : 6.9 K/uL  Auto Lymphocyte # : 1.7 K/uL  Auto Monocyte # : 1.4 K/uL  Auto Eosinophil # : 0.0 K/uL  Auto Basophil # : 0.0 K/uL  Auto Neutrophil % : 68.4 %  Auto Lymphocyte % : 16.6 %  Auto Monocyte % : 14.1 %  Auto Eosinophil % : 0.4 %  Auto Basophil % : 0.2 %    05-23    137  |  102  |  14.0  ----------------------------<  109  4.2   |  23.0  |  0.86    Ca    7.3<L>      23 May 2018 05:58  Phos  2.2     05-23  Mg     1.9     05-23      PTT - ( 22 May 2018 05:13 )  PTT:26.7 sec      Assessment and Plan:    Neuro: pain control    CV: Continue hemodynamic monitoring    Pulm: Pulmonary toilet.  Continue incentive spirometer.  Chest PT.  Encourage OOB to chair and ambulation     GI/Nutrition: make npo for now, enema q 4hours , might need rectal tube for what looks like Edd.      /Renal: monitor UOP. Rhabdo improving but still >1000 cpk. check daily cpk, ivf at 100cc/hour. Cr wnl.       HEME- DVT prophylaxis, SCDs, daily asa 81mg as per vascular.     Dispo: [facility]

## 2018-05-24 DIAGNOSIS — I71.4 ABDOMINAL AORTIC ANEURYSM, WITHOUT RUPTURE: ICD-10-CM

## 2018-05-24 DIAGNOSIS — M62.82 RHABDOMYOLYSIS: ICD-10-CM

## 2018-05-24 LAB
ANION GAP SERPL CALC-SCNC: 12 MMOL/L — SIGNIFICANT CHANGE UP (ref 5–17)
BASOPHILS # BLD AUTO: 0 K/UL — SIGNIFICANT CHANGE UP (ref 0–0.2)
BASOPHILS NFR BLD AUTO: 0.4 % — SIGNIFICANT CHANGE UP (ref 0–2)
BUN SERPL-MCNC: 15 MG/DL — SIGNIFICANT CHANGE UP (ref 8–20)
CALCIUM SERPL-MCNC: 7.8 MG/DL — LOW (ref 8.6–10.2)
CHLORIDE SERPL-SCNC: 103 MMOL/L — SIGNIFICANT CHANGE UP (ref 98–107)
CK MB CFR SERPL CALC: 4.9 NG/ML — SIGNIFICANT CHANGE UP (ref 0–6.7)
CK SERPL-CCNC: 4163 U/L — HIGH (ref 30–200)
CO2 SERPL-SCNC: 23 MMOL/L — SIGNIFICANT CHANGE UP (ref 22–29)
CREAT SERPL-MCNC: 0.75 MG/DL — SIGNIFICANT CHANGE UP (ref 0.5–1.3)
EOSINOPHIL # BLD AUTO: 0.1 K/UL — SIGNIFICANT CHANGE UP (ref 0–0.5)
EOSINOPHIL NFR BLD AUTO: 1.1 % — SIGNIFICANT CHANGE UP (ref 0–5)
GLUCOSE SERPL-MCNC: 105 MG/DL — SIGNIFICANT CHANGE UP (ref 70–115)
HCT VFR BLD CALC: 29.5 % — LOW (ref 42–52)
HGB BLD-MCNC: 9.6 G/DL — LOW (ref 14–18)
LYMPHOCYTES # BLD AUTO: 1.1 K/UL — SIGNIFICANT CHANGE UP (ref 1–4.8)
LYMPHOCYTES # BLD AUTO: 14.4 % — LOW (ref 20–55)
MAGNESIUM SERPL-MCNC: 1.9 MG/DL — SIGNIFICANT CHANGE UP (ref 1.6–2.6)
MCHC RBC-ENTMCNC: 31.9 PG — HIGH (ref 27–31)
MCHC RBC-ENTMCNC: 32.5 G/DL — SIGNIFICANT CHANGE UP (ref 32–36)
MCV RBC AUTO: 98 FL — HIGH (ref 80–94)
MONOCYTES # BLD AUTO: 0.9 K/UL — HIGH (ref 0–0.8)
MONOCYTES NFR BLD AUTO: 11.6 % — HIGH (ref 3–10)
NEUTROPHILS # BLD AUTO: 5.6 K/UL — SIGNIFICANT CHANGE UP (ref 1.8–8)
NEUTROPHILS NFR BLD AUTO: 72.1 % — SIGNIFICANT CHANGE UP (ref 37–73)
PHOSPHATE SERPL-MCNC: 2.5 MG/DL — SIGNIFICANT CHANGE UP (ref 2.4–4.7)
PLATELET # BLD AUTO: 168 K/UL — SIGNIFICANT CHANGE UP (ref 150–400)
POTASSIUM SERPL-MCNC: 4 MMOL/L — SIGNIFICANT CHANGE UP (ref 3.5–5.3)
POTASSIUM SERPL-SCNC: 4 MMOL/L — SIGNIFICANT CHANGE UP (ref 3.5–5.3)
RBC # BLD: 3.01 M/UL — LOW (ref 4.6–6.2)
RBC # FLD: 13.2 % — SIGNIFICANT CHANGE UP (ref 11–15.6)
SODIUM SERPL-SCNC: 138 MMOL/L — SIGNIFICANT CHANGE UP (ref 135–145)
WBC # BLD: 7.8 K/UL — SIGNIFICANT CHANGE UP (ref 4.8–10.8)
WBC # FLD AUTO: 7.8 K/UL — SIGNIFICANT CHANGE UP (ref 4.8–10.8)

## 2018-05-24 PROCEDURE — 74018 RADEX ABDOMEN 1 VIEW: CPT | Mod: 26

## 2018-05-24 PROCEDURE — 99232 SBSQ HOSP IP/OBS MODERATE 35: CPT

## 2018-05-24 RX ORDER — TAMSULOSIN HYDROCHLORIDE 0.4 MG/1
0.4 CAPSULE ORAL AT BEDTIME
Qty: 0 | Refills: 0 | Status: DISCONTINUED | OUTPATIENT
Start: 2018-05-24 | End: 2018-06-04

## 2018-05-24 RX ORDER — ACETAMINOPHEN 500 MG
650 TABLET ORAL EVERY 6 HOURS
Qty: 0 | Refills: 0 | Status: DISCONTINUED | OUTPATIENT
Start: 2018-05-24 | End: 2018-06-04

## 2018-05-24 RX ORDER — ACETAMINOPHEN 500 MG
1000 TABLET ORAL ONCE
Qty: 0 | Refills: 0 | Status: COMPLETED | OUTPATIENT
Start: 2018-05-24 | End: 2018-05-24

## 2018-05-24 RX ORDER — MAGNESIUM SULFATE 500 MG/ML
1 VIAL (ML) INJECTION ONCE
Qty: 0 | Refills: 0 | Status: COMPLETED | OUTPATIENT
Start: 2018-05-24 | End: 2018-05-24

## 2018-05-24 RX ADMIN — SENNA PLUS 2 TABLET(S): 8.6 TABLET ORAL at 22:02

## 2018-05-24 RX ADMIN — Medication 100 MILLIGRAM(S): at 14:39

## 2018-05-24 RX ADMIN — Medication 1000 MILLIGRAM(S): at 11:45

## 2018-05-24 RX ADMIN — Medication 62.5 MILLIMOLE(S): at 06:20

## 2018-05-24 RX ADMIN — GABAPENTIN 300 MILLIGRAM(S): 400 CAPSULE ORAL at 06:21

## 2018-05-24 RX ADMIN — Medication 400 MILLIGRAM(S): at 11:29

## 2018-05-24 RX ADMIN — GABAPENTIN 300 MILLIGRAM(S): 400 CAPSULE ORAL at 14:39

## 2018-05-24 RX ADMIN — Medication 100 GRAM(S): at 06:20

## 2018-05-24 RX ADMIN — ATORVASTATIN CALCIUM 10 MILLIGRAM(S): 80 TABLET, FILM COATED ORAL at 22:02

## 2018-05-24 RX ADMIN — Medication 100 MILLIGRAM(S): at 22:02

## 2018-05-24 RX ADMIN — HEPARIN SODIUM 5000 UNIT(S): 5000 INJECTION INTRAVENOUS; SUBCUTANEOUS at 22:02

## 2018-05-24 RX ADMIN — Medication 5 MILLIGRAM(S): at 22:02

## 2018-05-24 RX ADMIN — Medication 133 MILLILITER(S): at 00:44

## 2018-05-24 RX ADMIN — HEPARIN SODIUM 5000 UNIT(S): 5000 INJECTION INTRAVENOUS; SUBCUTANEOUS at 14:39

## 2018-05-24 RX ADMIN — Medication 81 MILLIGRAM(S): at 11:28

## 2018-05-24 RX ADMIN — Medication 650 MILLIGRAM(S): at 22:02

## 2018-05-24 RX ADMIN — HEPARIN SODIUM 5000 UNIT(S): 5000 INJECTION INTRAVENOUS; SUBCUTANEOUS at 06:21

## 2018-05-24 RX ADMIN — GABAPENTIN 300 MILLIGRAM(S): 400 CAPSULE ORAL at 22:02

## 2018-05-24 RX ADMIN — PANTOPRAZOLE SODIUM 40 MILLIGRAM(S): 20 TABLET, DELAYED RELEASE ORAL at 06:21

## 2018-05-24 RX ADMIN — TAMSULOSIN HYDROCHLORIDE 0.4 MILLIGRAM(S): 0.4 CAPSULE ORAL at 22:02

## 2018-05-24 RX ADMIN — LISINOPRIL 5 MILLIGRAM(S): 2.5 TABLET ORAL at 06:21

## 2018-05-24 RX ADMIN — Medication 100 MILLIGRAM(S): at 06:21

## 2018-05-24 RX ADMIN — DONEPEZIL HYDROCHLORIDE 10 MILLIGRAM(S): 10 TABLET, FILM COATED ORAL at 22:02

## 2018-05-24 NOTE — PROGRESS NOTE ADULT - SUBJECTIVE AND OBJECTIVE BOX
INTERVAL HPI/OVERNIGHT EVENTS: Pt had significant ileus and abd distention yesterday which is somewhat improved today after 3 BM in past 24 hr with more aggressive bowel regimen.  Pt can not give accurate HX since he has significant dementia.      STATUS POST:  URSZULA CODY 5/21    POST OPERATIVE DAY #: 3    SUBJECTIVE:  Flatus: [ ] YES [ ]   Bowel Movement: [ ] YES [ ]   Pain (0-10):            Pain Control Adequate: [ ] YES [ ]   Nausea: [ ] [ ] NO            Vomiting: [ ] [ ] NO  Diarrhea: [ ] [ ] NO         Constipation: [ ] [ ] NO     Chest Pain: [ ] [ ] NO    SOB:  [ ] [ ] NO    MEDICATIONS  (STANDING):  aspirin  chewable 81 milliGRAM(s) Oral daily  atorvastatin 10 milliGRAM(s) Oral at bedtime  docusate sodium 100 milliGRAM(s) Oral three times a day  donepezil 10 milliGRAM(s) Oral at bedtime  gabapentin 300 milliGRAM(s) Oral three times a day  heparin  Injectable 5000 Unit(s) SubCutaneous every 8 hours  lisinopril 5 milliGRAM(s) Oral daily  melatonin 5 milliGRAM(s) Oral at bedtime  multiple electrolytes Injection Type 1 1000 milliLiter(s) (100 mL/Hr) IV Continuous <Continuous>  pantoprazole    Tablet 40 milliGRAM(s) Oral before breakfast  senna 2 Tablet(s) Oral at bedtime    MEDICATIONS  (PRN):  HYDROmorphone  Injectable 0.5 milliGRAM(s) IV Push every 3 hours PRN Moderate Pain (4 - 6)  HYDROmorphone  Injectable 1 milliGRAM(s) IV Push every 3 hours PRN Severe Pain (7 - 10)      Vital Signs Last 24 Hrs  T(C): 37.1 (24 May 2018 08:00), Max: 37.8 (23 May 2018 20:00)  T(F): 98.7 (24 May 2018 08:00), Max: 100 (23 May 2018 20:00)  HR: 77 (24 May 2018 10:00) (72 - 90)  BP: 157/75 (24 May 2018 10:00) (132/65 - 180/83)  BP(mean): 108 (24 May 2018 10:00) (92 - 140)  RR: 26 (24 May 2018 10:00) (14 - 29)  SpO2: 99% (24 May 2018 10:00) (83% - 100%)    PHYSICAL EXAM:     Gen: NAD, Well appearing, No cyanosis, Pallor.    Eyes: PERRL ~ 3mm, EOMI,     Neurological: A&Ox person only, GCS 4/4/6, No focal deficit.      Neck: Supple. NT AT, FROM no pain.  No JVD. No meningeal signs    Pulmonary: NAD, CTA, = BL .      Cardiovascular: Occasionally irregular, S1, S2, No Murmurs, rubs or gallops noted.    Gastrointestinal: + mod-severe distention, + slow BS x4. Soft, NT.    Extremities: L AKA noted.  moderately tender.  Mild erythema noted at incision. No edema or palpable cord noted.  FROM, = 2+ pulses throughout remaining ext.    I&O's Detail    23 May 2018 07:01  -  24 May 2018 07:00  --------------------------------------------------------  IN:    multiple electrolytes Injection Type 1: 2300 mL    Solution: 250 mL    Solution: 100 mL  Total IN: 2650 mL    OUT:    Indwelling Catheter - Urethral: 2535 mL  Total OUT: 2535 mL    Total NET: 115 mL      24 May 2018 07:01  -  24 May 2018 10:46  --------------------------------------------------------  IN:    multiple electrolytes Injection Type 1: 400 mL    Solution: 187.5 mL  Total IN: 587.5 mL    OUT:    Indwelling Catheter - Urethral: 245 mL  Total OUT: 245 mL    Total NET: 342.5 mL          LABS:                        9.6    7.8   )-----------( 168      ( 24 May 2018 04:23 )             29.5     05-24    138  |  103  |  15.0  ----------------------------<  105  4.0   |  23.0  |  0.75    Ca    7.8<L>      24 May 2018 04:23  Phos  2.5     05-24  Mg     1.9     05-24            RADIOLOGY & ADDITIONAL STUDIES:

## 2018-05-24 NOTE — PROGRESS NOTE ADULT - SUBJECTIVE AND OBJECTIVE BOX
INTERVAL HPI/OVERNIGHT EVENTS/SUBJECTIVE:  Multiple BMs overnight.      ICU Vital Signs Last 24 Hrs  T(C): 37.3 (24 May 2018 12:00), Max: 37.8 (23 May 2018 20:00)  T(F): 99.1 (24 May 2018 12:00), Max: 100 (23 May 2018 20:00)  HR: 67 (24 May 2018 12:00) (67 - 90)  BP: 139/65 (24 May 2018 12:00) (132/65 - 180/83)  BP(mean): 94 (24 May 2018 12:00) (92 - 140)  ABP: 166/62 (24 May 2018 10:00) (100/86 - 175/73)  ABP(mean): 97 (24 May 2018 10:00) (94 - 139)  RR: 14 (24 May 2018 12:00) (14 - 29)  SpO2: 98% (24 May 2018 12:00) (83% - 100%)      I&O's Detail    23 May 2018 07:01  -  24 May 2018 07:00  --------------------------------------------------------  IN:    multiple electrolytes Injection Type 1: 2300 mL    Solution: 250 mL    Solution: 100 mL  Total IN: 2650 mL    OUT:    Indwelling Catheter - Urethral: 2535 mL  Total OUT: 2535 mL    Total NET: 115 mL      24 May 2018 07:01  -  24 May 2018 13:13  --------------------------------------------------------  IN:    multiple electrolytes Injection Type 1: 600 mL    Solution: 250 mL    Solution: 100 mL  Total IN: 950 mL    OUT:    Indwelling Catheter - Urethral: 420 mL  Total OUT: 420 mL    Total NET: 530 mL                MEDICATIONS  (STANDING):  acetaminophen   Tablet. 650 milliGRAM(s) Oral every 6 hours  aspirin  chewable 81 milliGRAM(s) Oral daily  atorvastatin 10 milliGRAM(s) Oral at bedtime  docusate sodium 100 milliGRAM(s) Oral three times a day  donepezil 10 milliGRAM(s) Oral at bedtime  gabapentin 300 milliGRAM(s) Oral three times a day  heparin  Injectable 5000 Unit(s) SubCutaneous every 8 hours  lisinopril 5 milliGRAM(s) Oral daily  melatonin 5 milliGRAM(s) Oral at bedtime  multiple electrolytes Injection Type 1 1000 milliLiter(s) (100 mL/Hr) IV Continuous <Continuous>  pantoprazole    Tablet 40 milliGRAM(s) Oral before breakfast  senna 2 Tablet(s) Oral at bedtime    MEDICATIONS  (PRN):  bisacodyl Suppository 10 milliGRAM(s) Rectal daily PRN Constipation  HYDROmorphone  Injectable 0.5 milliGRAM(s) IV Push every 3 hours PRN Moderate Pain (4 - 6)  HYDROmorphone  Injectable 1 milliGRAM(s) IV Push every 3 hours PRN Severe Pain (7 - 10)          PHYSICAL EXAM:    Gen:  NAD    Eyes:  PERRLA    Neurological: AAOx1.     Neck: Trachea midline    Pulmonary:  Non labored.      Cardiovascular:  RRR    Gastrointestinal: Softly distended.      Genitourinary:  Parra          LABS:  CBC Full  -  ( 24 May 2018 04:23 )  WBC Count : 7.8 K/uL  Hemoglobin : 9.6 g/dL  Hematocrit : 29.5 %  Platelet Count - Automated : 168 K/uL  Mean Cell Volume : 98.0 fl  Mean Cell Hemoglobin : 31.9 pg  Mean Cell Hemoglobin Concentration : 32.5 g/dL  Auto Neutrophil # : 5.6 K/uL  Auto Lymphocyte # : 1.1 K/uL  Auto Monocyte # : 0.9 K/uL  Auto Eosinophil # : 0.1 K/uL  Auto Basophil # : 0.0 K/uL  Auto Neutrophil % : 72.1 %  Auto Lymphocyte % : 14.4 %  Auto Monocyte % : 11.6 %  Auto Eosinophil % : 1.1 %  Auto Basophil % : 0.4 %    05-24    138  |  103  |  15.0  ----------------------------<  105  4.0   |  23.0  |  0.75    Ca    7.8<L>      24 May 2018 04:23  Phos  2.5     05-24  Mg     1.9     05-24          RECENT CULTURES:        CARDIAC MARKERS ( 24 May 2018 04:23 )  x     / x     / 4163 U/L / x     / 4.9 ng/mL  CARDIAC MARKERS ( 23 May 2018 05:58 )  x     / x     / 4580 U/L / x     / 7.4 ng/mL  CARDIAC MARKERS ( 22 May 2018 20:12 )  x     / x     / 5196 U/L / x     / 11.2 ng/mL      CAPILLARY BLOOD GLUCOSE      RADIOLOGY & ADDITIONAL STUDIES:    ASSESSMENT/PLAN:  88yMale presenting with:  Ischemic leg s/p aka, rhabdo, improving.      Neuro:  Pain controlled.  Delirium prevention.      Pulm: Pulm hygiene.     GI/Nutrition:  Ileus seems improved.  Diet.      /Renal: Rhabdo - CPK continues to clear, No signs of significant renal impairment.  Cont IV fluid for now.  Acute urinary retention.  Maintain parra.  Start flomax.  Voiding trial in 48 hrs.      Proph:  [facility]    Dispo: [facility] for [facility].

## 2018-05-24 NOTE — CHART NOTE - NSCHARTNOTEFT_GEN_A_CORE
Called by RN with concerns regarding scrotal blister and blue color of right leg. Pt seen and examined with RN at bedside. He appears comfortable, no distress, pleasantly demented. Scrotum is edematous, blister noted to anterior aspect of scrotum, although not draining appears to be filled with a clear, serous-like fluid. No hematoma, skin is non-erythematous and entirety of scrotum is non-tender to palpation. Regarding right leg, patient has ecchymosis in different stages of healing spanning majority of posterior aspect of right leg. Foot is cool but pt has palpable DP pulse, skin dry, sensation and motor fxn to distal RLE grossly intact. Advised nurse to apply scrotal elevation and elevate RLE if pt will tolerate, and monitor both scrotum and leg; no other interventions necessary at this time.

## 2018-05-24 NOTE — PROGRESS NOTE ADULT - ASSESSMENT
87 yo M GERONIMO PRAKASH.      -Significant multiple arterial aneurysms throughout aorta and LE. Will need continued evaluation and FU.  - Still with significant ileus but otherwise appears to have expected post op SX. Will continue bowel care, get out of bed, work with PT,     -Continue daily dressing changes and monitor for healing or SN of infection.  - Rhabdomyolysis improving.  Will continue diet and Méndez for I/O. Continue trend Cr and intermittent CK. 89 yo M GERONIMO PRAKASH.      -Significant multiple arterial aneurysms throughout aorta and LE. Will need continued evaluation and FU.  - Still with significant ileus but otherwise appears to have expected post op SX. Will continue bowel care, get out of bed, work with PT,     -Continue daily dressing changes and monitor for healing or SN of infection.  - Rhabdomyolysis improving.  Will continue diet and Méndez for I/O. Continue trend Cr and intermittent CK.     -Remainder care per SICU.    -Please notify us of plan to downgrade once felt to be stable.

## 2018-05-24 NOTE — PROGRESS NOTE ADULT - ASSESSMENT
89 yo elderly Male with Advanced Dementia    Severe PAD- L Ischemic Lower Extremity  -Significant multiple arterial aneurysms throughout aorta and LE.   Will need continued evaluation and FU.  ELIZABETH POD # 3  -Continue daily dressing changes and monitor for healing or SN of infection.  - Rhabdomyolysis improving.  Will continue diet and Méndez for I/O. Continue trend Cr and intermittent CK.     Acute Pain- Poor self advocate due to cognitive impaiirment  [facility]  Tylenol [facility]  Continue adjuvants  Dilaudid for severe pain, breakthrough and painful procedures    Abdominal Distention ILEUS    - Still with significant ileus but otherwise appears to have expected post op SX.   Will continue bowel care, get out of bed, work with PT,   Dulcolax prn    PLAN  Acute Rehab to regain upper body strength-  improve mobility and decrease caregiver burden

## 2018-05-24 NOTE — PROGRESS NOTE ADULT - SUBJECTIVE AND OBJECTIVE BOX
INTERVAL HPI/OVERNIGHT EVENTS: 87 yo Male Patient PMH Advanced Dementia w/o behavioral disturbance; severe PAD admitted with LLE DVT.  He had severe pain and  Ischemic LLE needing  L AKA is post-op Day  .  Today patient is A/O x3 OOB/CH forgetful, pleasant in no distress  CC: "Constipation and abdominal distention"  Post-op L AKA mild to moderate pain  with ace wrap and dressing D/C/I. When asked about is L leg discomfort (where he had the surgery on his leg-  he first replied no. Then when we discussed it further he rubbed his anterior LLE Stump and said it bothers me here; I know they did surgery there, I know it will get better.  Asked if he would like some tylenol; he replied "it wouldn't hurt"     88y old  Male who presents with a chief complaint of cold L leg (20 May 2018 06:48)    PAST MEDICAL & SURGICAL HISTORY:  DVT (deep venous thrombosis)  Cataract of both eyes  Prostate CA  AAA (abdominal aortic aneurysm)  Alzheimer disease  Hypertension  DVT (deep venous thrombosis)  AAA (abdominal aortic aneurysm): 5cm  Right iliac aneurysm 4cm  Alzheimer disease  Cataract  Prostate ca  Gallstones  History of cataract surgery  H/O prostatectomy  History of cholecystectomy  H/O prostatectomy: 1998  Status post cataract surgery: bilateral  History of cholecystectomy: 1997    Present Symptoms:     Dyspnea: 0    Nausea/Vomiting: No  Anxiety:   No  Depression:No  Fatigue: Yes   Loss of appetite: Yes /No NPO    Pain: Post L AKA stump and phantom pain            Character-            Duration-            Effect-            Factors-            Frequency-            Location-            Severity-moderate    Review of Systems: Reviewed                     Unable to obtain due to poor mentation       MEDICATIONS  (STANDING):  acetaminophen   Tablet. 650 milliGRAM(s) Oral every 6 hours  acetaminophen  IVPB. 1000 milliGRAM(s) IV Intermittent once  aspirin  chewable 81 milliGRAM(s) Oral daily  atorvastatin 10 milliGRAM(s) Oral at bedtime  docusate sodium 100 milliGRAM(s) Oral three times a day  donepezil 10 milliGRAM(s) Oral at bedtime  gabapentin 300 milliGRAM(s) Oral three times a day  heparin  Injectable 5000 Unit(s) SubCutaneous every 8 hours  lisinopril 5 milliGRAM(s) Oral daily  melatonin 5 milliGRAM(s) Oral at bedtime  multiple electrolytes Injection Type 1 1000 milliLiter(s) (100 mL/Hr) IV Continuous <Continuous>  pantoprazole    Tablet 40 milliGRAM(s) Oral before breakfast  senna 2 Tablet(s) Oral at bedtime    MEDICATIONS  (PRN):  bisacodyl Suppository 10 milliGRAM(s) Rectal daily PRN Constipation  HYDROmorphone  Injectable 0.5 milliGRAM(s) IV Push every 3 hours PRN Moderate Pain (4 - 6)  HYDROmorphone  Injectable 1 milliGRAM(s) IV Push every 3 hours PRN Severe Pain (7 - 10)      PHYSICAL EXAM:    Vital Signs Last 24 Hrs  T(C): 37.1 (24 May 2018 08:00), Max: 37.8 (23 May 2018 20:00)  T(F): 98.7 (24 May 2018 08:00), Max: 100 (23 May 2018 20:00)  HR: 77 (24 May 2018 10:00) (72 - 90)  BP: 157/75 (24 May 2018 10:00) (132/65 - 180/83)  BP(mean): 108 (24 May 2018 10:00) (92 - 140)  RR: 26 (24 May 2018 10:00) (15 - 29)  SpO2: 99% (24 May 2018 10:00) (83% - 100%)    General: alert  oriented x __1__          HEENT: dry mouth      Lungs: comfortable    CV: normal      GI: normal  distended    no BS             constipation  last BM: pre-op    : normal  parra    MSK: weakness  Poor upper body strength             bedbound/wheelchair bound- Pham lift to chair    Skin: normal  pressure ulcers- Stage_____  no rash    LABS:                        9.6    7.8   )-----------( 168      ( 24 May 2018 04:23 )             29.5     05-24    138  |  103  |  15.0  ----------------------------<  105  4.0   |  23.0  |  0.75    Ca    7.8<L>      24 May 2018 04:23  Phos  2.5     05-24  Mg     1.9     05-24    I&O's Summary    23 May 2018 07:01  -  24 May 2018 07:00  --------------------------------------------------------  IN: 2650 mL / OUT: 2535 mL / NET: 115 mL    24 May 2018 07:01  -  24 May 2018 11:00  --------------------------------------------------------  IN: 587.5 mL / OUT: 245 mL / NET: 342.5 mL    RADIOLOGY & ADDITIONAL STUDIES:    ADVANCE DIRECTIVES:   DNR YES   Completed on:                     ANDREZ  YES    Completed on:  Living Will  YES    Completed on: Duration Of Freeze Thaw-Cycle (Seconds): 0 Render Post Care In The Note?: yes Consent: The patient's consent was obtained including but not limited to risks of pain, crusting, blistering. Total Number Of Lesions Treated: 3 Detail Level: Detailed Medical Necessity Clause: This procedure was medically necessary because the lesions that were treated were: Medical Necessity Information: It is in your best interest to select a reason for this procedure from the list below. All of these items fulfill various CMS LCD requirements except the new and changing color options. Add 52 Modifier (Optional): no Post-Care Instructions: Pt may apply Vaseline to crusted or scabbing areas.

## 2018-05-25 ENCOUNTER — TRANSCRIPTION ENCOUNTER (OUTPATIENT)
Age: 83
End: 2018-05-25

## 2018-05-25 LAB
ANION GAP SERPL CALC-SCNC: 13 MMOL/L — SIGNIFICANT CHANGE UP (ref 5–17)
BASOPHILS # BLD AUTO: 0 K/UL — SIGNIFICANT CHANGE UP (ref 0–0.2)
BASOPHILS NFR BLD AUTO: 0.4 % — SIGNIFICANT CHANGE UP (ref 0–2)
BUN SERPL-MCNC: 13 MG/DL — SIGNIFICANT CHANGE UP (ref 8–20)
CALCIUM SERPL-MCNC: 7.9 MG/DL — LOW (ref 8.6–10.2)
CHLORIDE SERPL-SCNC: 106 MMOL/L — SIGNIFICANT CHANGE UP (ref 98–107)
CK MB CFR SERPL CALC: 4.4 NG/ML — SIGNIFICANT CHANGE UP (ref 0–6.7)
CK SERPL-CCNC: 2837 U/L — HIGH (ref 30–200)
CO2 SERPL-SCNC: 22 MMOL/L — SIGNIFICANT CHANGE UP (ref 22–29)
CREAT SERPL-MCNC: 0.75 MG/DL — SIGNIFICANT CHANGE UP (ref 0.5–1.3)
EOSINOPHIL # BLD AUTO: 0.2 K/UL — SIGNIFICANT CHANGE UP (ref 0–0.5)
EOSINOPHIL NFR BLD AUTO: 2.2 % — SIGNIFICANT CHANGE UP (ref 0–5)
GLUCOSE SERPL-MCNC: 101 MG/DL — SIGNIFICANT CHANGE UP (ref 70–115)
HCT VFR BLD CALC: 28.7 % — LOW (ref 42–52)
HGB BLD-MCNC: 9.5 G/DL — LOW (ref 14–18)
LYMPHOCYTES # BLD AUTO: 1.2 K/UL — SIGNIFICANT CHANGE UP (ref 1–4.8)
LYMPHOCYTES # BLD AUTO: 16 % — LOW (ref 20–55)
MAGNESIUM SERPL-MCNC: 1.9 MG/DL — SIGNIFICANT CHANGE UP (ref 1.6–2.6)
MCHC RBC-ENTMCNC: 32.2 PG — HIGH (ref 27–31)
MCHC RBC-ENTMCNC: 33.1 G/DL — SIGNIFICANT CHANGE UP (ref 32–36)
MCV RBC AUTO: 97.3 FL — HIGH (ref 80–94)
MONOCYTES # BLD AUTO: 0.8 K/UL — SIGNIFICANT CHANGE UP (ref 0–0.8)
MONOCYTES NFR BLD AUTO: 9.9 % — SIGNIFICANT CHANGE UP (ref 3–10)
NEUTROPHILS # BLD AUTO: 5.6 K/UL — SIGNIFICANT CHANGE UP (ref 1.8–8)
NEUTROPHILS NFR BLD AUTO: 71.2 % — SIGNIFICANT CHANGE UP (ref 37–73)
PHOSPHATE SERPL-MCNC: 2.1 MG/DL — LOW (ref 2.4–4.7)
PLATELET # BLD AUTO: 195 K/UL — SIGNIFICANT CHANGE UP (ref 150–400)
POTASSIUM SERPL-MCNC: 3.5 MMOL/L — SIGNIFICANT CHANGE UP (ref 3.5–5.3)
POTASSIUM SERPL-SCNC: 3.5 MMOL/L — SIGNIFICANT CHANGE UP (ref 3.5–5.3)
RBC # BLD: 2.95 M/UL — LOW (ref 4.6–6.2)
RBC # FLD: 13.1 % — SIGNIFICANT CHANGE UP (ref 11–15.6)
SODIUM SERPL-SCNC: 141 MMOL/L — SIGNIFICANT CHANGE UP (ref 135–145)
WBC # BLD: 7.8 K/UL — SIGNIFICANT CHANGE UP (ref 4.8–10.8)
WBC # FLD AUTO: 7.8 K/UL — SIGNIFICANT CHANGE UP (ref 4.8–10.8)

## 2018-05-25 PROCEDURE — 99223 1ST HOSP IP/OBS HIGH 75: CPT

## 2018-05-25 RX ORDER — ACETAMINOPHEN 500 MG
2 TABLET ORAL
Qty: 0 | Refills: 0 | COMMUNITY
Start: 2018-05-25

## 2018-05-25 RX ORDER — OMEGA-3 ACID ETHYL ESTERS 1 G
1 CAPSULE ORAL
Qty: 0 | Refills: 0 | COMMUNITY

## 2018-05-25 RX ORDER — LANOLIN ALCOHOL/MO/W.PET/CERES
1 CREAM (GRAM) TOPICAL
Qty: 0 | Refills: 0 | COMMUNITY
Start: 2018-05-25

## 2018-05-25 RX ORDER — TAMSULOSIN HYDROCHLORIDE 0.4 MG/1
1 CAPSULE ORAL
Qty: 0 | Refills: 0 | COMMUNITY
Start: 2018-05-25

## 2018-05-25 RX ORDER — MEMANTINE HYDROCHLORIDE 10 MG/1
1 TABLET ORAL
Qty: 0 | Refills: 0 | COMMUNITY

## 2018-05-25 RX ORDER — PANTOPRAZOLE SODIUM 20 MG/1
1 TABLET, DELAYED RELEASE ORAL
Qty: 0 | Refills: 0 | COMMUNITY
Start: 2018-05-25

## 2018-05-25 RX ORDER — SENNA PLUS 8.6 MG/1
2 TABLET ORAL
Qty: 0 | Refills: 0 | COMMUNITY
Start: 2018-05-25

## 2018-05-25 RX ORDER — GABAPENTIN 400 MG/1
1 CAPSULE ORAL
Qty: 0 | Refills: 0 | COMMUNITY
Start: 2018-05-25

## 2018-05-25 RX ORDER — DOCUSATE SODIUM 100 MG
1 CAPSULE ORAL
Qty: 0 | Refills: 0 | COMMUNITY
Start: 2018-05-25

## 2018-05-25 RX ORDER — HEPARIN SODIUM 5000 [USP'U]/ML
5000 INJECTION INTRAVENOUS; SUBCUTANEOUS
Qty: 0 | Refills: 0 | COMMUNITY
Start: 2018-05-25

## 2018-05-25 RX ORDER — LISINOPRIL 2.5 MG/1
1 TABLET ORAL
Qty: 0 | Refills: 0 | COMMUNITY
Start: 2018-05-25

## 2018-05-25 RX ORDER — AMLODIPINE BESYLATE 2.5 MG/1
1 TABLET ORAL
Qty: 0 | Refills: 0 | COMMUNITY

## 2018-05-25 RX ADMIN — Medication 100 MILLIGRAM(S): at 14:11

## 2018-05-25 RX ADMIN — HEPARIN SODIUM 5000 UNIT(S): 5000 INJECTION INTRAVENOUS; SUBCUTANEOUS at 14:11

## 2018-05-25 RX ADMIN — DONEPEZIL HYDROCHLORIDE 10 MILLIGRAM(S): 10 TABLET, FILM COATED ORAL at 21:23

## 2018-05-25 RX ADMIN — HEPARIN SODIUM 5000 UNIT(S): 5000 INJECTION INTRAVENOUS; SUBCUTANEOUS at 05:10

## 2018-05-25 RX ADMIN — Medication 100 MILLIGRAM(S): at 21:23

## 2018-05-25 RX ADMIN — Medication 650 MILLIGRAM(S): at 21:23

## 2018-05-25 RX ADMIN — SODIUM CHLORIDE 100 MILLILITER(S): 9 INJECTION, SOLUTION INTRAVENOUS at 05:19

## 2018-05-25 RX ADMIN — SENNA PLUS 2 TABLET(S): 8.6 TABLET ORAL at 21:23

## 2018-05-25 RX ADMIN — Medication 650 MILLIGRAM(S): at 05:05

## 2018-05-25 RX ADMIN — GABAPENTIN 300 MILLIGRAM(S): 400 CAPSULE ORAL at 18:30

## 2018-05-25 RX ADMIN — Medication 650 MILLIGRAM(S): at 18:30

## 2018-05-25 RX ADMIN — HYDROMORPHONE HYDROCHLORIDE 0.5 MILLIGRAM(S): 2 INJECTION INTRAMUSCULAR; INTRAVENOUS; SUBCUTANEOUS at 19:31

## 2018-05-25 RX ADMIN — LISINOPRIL 5 MILLIGRAM(S): 2.5 TABLET ORAL at 05:10

## 2018-05-25 RX ADMIN — ATORVASTATIN CALCIUM 10 MILLIGRAM(S): 80 TABLET, FILM COATED ORAL at 21:23

## 2018-05-25 RX ADMIN — Medication 650 MILLIGRAM(S): at 22:20

## 2018-05-25 RX ADMIN — Medication 650 MILLIGRAM(S): at 14:11

## 2018-05-25 RX ADMIN — HYDROMORPHONE HYDROCHLORIDE 0.5 MILLIGRAM(S): 2 INJECTION INTRAMUSCULAR; INTRAVENOUS; SUBCUTANEOUS at 18:31

## 2018-05-25 RX ADMIN — Medication 100 MILLIGRAM(S): at 05:10

## 2018-05-25 RX ADMIN — Medication 5 MILLIGRAM(S): at 21:23

## 2018-05-25 RX ADMIN — Medication 81 MILLIGRAM(S): at 14:11

## 2018-05-25 RX ADMIN — TAMSULOSIN HYDROCHLORIDE 0.4 MILLIGRAM(S): 0.4 CAPSULE ORAL at 21:23

## 2018-05-25 RX ADMIN — PANTOPRAZOLE SODIUM 40 MILLIGRAM(S): 20 TABLET, DELAYED RELEASE ORAL at 05:10

## 2018-05-25 RX ADMIN — GABAPENTIN 300 MILLIGRAM(S): 400 CAPSULE ORAL at 21:23

## 2018-05-25 RX ADMIN — Medication 650 MILLIGRAM(S): at 19:30

## 2018-05-25 RX ADMIN — GABAPENTIN 300 MILLIGRAM(S): 400 CAPSULE ORAL at 05:10

## 2018-05-25 RX ADMIN — Medication 650 MILLIGRAM(S): at 15:11

## 2018-05-25 RX ADMIN — HEPARIN SODIUM 5000 UNIT(S): 5000 INJECTION INTRAVENOUS; SUBCUTANEOUS at 21:23

## 2018-05-25 NOTE — DISCHARGE NOTE ADULT - HOSPITAL COURSE
88 year old male w/PMH of Alzheimers, prostate ca s/p prostatectomy transferred here from [place] with pulselessness, pain and numbness to [facility] that developed around 9pm . Patients family reports a recent diagnosis of DVT to [facility] and started Lovenox / coumadin therapy about 10 days ago with a recent INR of 2.3 a few days ago, however INR - 4.2 on repeat labs here . Lactate uptrending from 2.6 to 4.7. A CTA performed revealed Multiple irregular filling defects lie in the lumen of the left superficial femoral artery, the left SFA occludes by the adductor canal.  There is no reconstitution of distal arteries of the left leg, concerning for severe arterial compromise to the left calf and foot.  Unknown if the irregular filling defects in the left SFA represent irregular mural thrombus and severe vascular disease versus thromboemboli.  Aneurysmal change of the infrarenal abdominal aorta, the common iliac arteries bilaterally, the internal iliac arteries bilaterally and the popliteal arteries bilaterally  Vascular exam revealed an insensateotor function and therefore an AKA was recommended. The pt was placed on a heparin gtt with downtrending INR. He was seen and evaluated by cariology and found to be acceptable surgical risk. He was staken to the OR on 5/21/18 by Dr Fry and is S/P L AKA. He tolerated the procedure well and was extubated without difficulty. He did have PO urinary retention which required parra to be reinserted and he was started on flomax. Physical therapy consult was obtained and the pt was referred for acute rehab. He will need to have his AAA and R iliac artery aneurysm addressed in the future. He is stable for dc to rehab 88 year old male w/PMH of Alzheimers, prostate ca s/p prostatectomy transferred here from [place] with pulselessness, pain and numbness to [facility] that developed around 9pm . Patients family reports a recent diagnosis of DVT to [facility] and started Lovenox / coumadin therapy about 10 days ago with a recent INR of 2.3 a few days ago, however INR - 4.2 on repeat labs here . Lactate uptrending from 2.6 to 4.7. A CTA performed revealed Multiple irregular filling defects lie in the lumen of the left superficial femoral artery, the left SFA occludes by the adductor canal.  There is no reconstitution of distal arteries of the left leg, concerning for severe arterial compromise to the left calf and foot.  Unknown if the irregular filling defects in the left SFA represent irregular mural thrombus and severe vascular disease versus thromboemboli.  Aneurysmal change of the infrarenal abdominal aorta, the common iliac arteries bilaterally, the internal iliac arteries bilaterally and the popliteal arteries bilaterally  Vascular exam revealed an insensateotor function and therefore an AKA was recommended. The pt was placed on a heparin gtt with downtrending INR. He was seen and evaluated by cariology and found to be acceptable surgical risk. He was staken to the OR on 5/21/18 by Dr Fry and is S/P L AKA. He tolerated the procedure well and was extubated without difficulty. He did have PO urinary retention which required parra to be reinserted and he was started on flomax. Physical therapy consult was obtained and the pt was referred for acute rehab. He will need to have his AAA and R iliac artery aneurysm addressed in the future.    MAY 29 , Patient required brief stay in the SICU due to the effects of urosepsis.  Patient responded well to IV ceftriaxone, and is currently stable for discharge to Acute Rehab.  Patient is to continue daily ceftriaxone till 6/13.

## 2018-05-25 NOTE — CONSULT NOTE ADULT - SUBJECTIVE AND OBJECTIVE BOX
88yM was admitted on 05-20 with a left cool and pulseless leg. He was recently diagnosed with a DVT s/p Coumadin. Workup also showed an elevated INR and Lactate with elevated SBP>200. Patient is s/p left AKA on 5/21 by .   He was downgraded from ICU yesterday.     Patient reports having left LE pain, but has no recall of the events that occurred. Poor ongoing memory. Able to follow commands. History of home environment appears to be consistent.     REVIEW OF SYSTEMS  Constitutional - No fever, No weight loss, No fatigue  HEENT - No eye pain, No visual disturbances, No difficulty hearing, No tinnitus, No vertigo, No neck pain  Respiratory - No cough, No wheezing, No shortness of breath  Cardiovascular - No chest pain, No palpitations  Gastrointestinal - No abdominal pain, No nausea, No vomiting, No diarrhea, No constipation  Genitourinary - No dysuria, No frequency, No hematuria, +incontinence  Neurological - No headaches, +memory loss, +loss of strength, No numbness, No tremors  Skin - No itching, No rashes, No lesions   Endocrine - No temperature intolerance  Musculoskeletal - +joint pain, +joint swelling, +muscle pain  Psychiatric - No depression, No anxiety    VITALS  T(C): 36.7 (05-25-18 @ 08:06), Max: 37.3 (05-24-18 @ 12:00)  HR: 80 (05-25-18 @ 08:06) (62 - 89)  BP: 149/71 (05-25-18 @ 08:06) (130/67 - 170/76)  RR: 18 (05-25-18 @ 08:06) (14 - 25)  SpO2: 95% (05-25-18 @ 08:06) (93% - 100%)  Wt(kg): --    PAST MEDICAL & SURGICAL HISTORY  DVT (deep venous thrombosis)  Cataract of both eyes  Prostate CA  AAA (abdominal aortic aneurysm)  Alzheimer disease  Hypertension  DVT (deep venous thrombosis)  AAA (abdominal aortic aneurysm)  Alzheimer disease  Cataract  Prostate ca  Gallstones  History of cataract surgery  H/O prostatectomy  History of cholecystectomy  H/O prostatectomy  Status post cataract surgery  History of cholecystectomy      SOCIAL HISTORY  Smoking - Denied  EtOH - Denied   Drugs - Denied    FUNCTIONAL HISTORY  Lives with wife, 4 SWAPNA  Independent with ?AD    CURRENT FUNCTIONAL STATUS  5/25  Bed Mobility  Bed Mobility Training Rolling/Turning: moderate assist (50% patient effort);  1 person assist;  verbal cues;  bed rails  Bed Mobility Training Scooting: moderate assist (50% patient effort);  1 person assist;  verbal cues;  bed rails  Bed Mobility Training Sit-to-Supine: moderate assist (50% patient effort);  1 person assist;  verbal cues;  bed rails  Bed Mobility Training Supine-to-Sit: maximum assist (25% patient effort);  1 person assist;  verbal cues;  bed rails  Bed Mobility Training Limitations: decreased ability to use arms for pushing/pulling;  impaired ability to control trunk for mobility;  decreased strength;  pain    Sit-Stand Transfer Training  Transfer Training Sit-to-Stand Transfer: unable to perform;  rolling walker  Sit-to-Stand Transfer Training Transfer Safety Analysis: decreased sequencing ability;  decreased weight-shifting ability;  decreased strength;  pain;  rolling walker    FAMILY HISTORY   No pertinent family history in first degree relatives  Family history of Alzheimer's disease (Sibling)  Family history of heart disease (Father)      RECENT LABS/IMAGING  CBC Full  -  ( 25 May 2018 06:15 )  WBC Count : 7.8 K/uL  Hemoglobin : 9.5 g/dL  Hematocrit : 28.7 %  Platelet Count - Automated : 195 K/uL  Mean Cell Volume : 97.3 fl  Mean Cell Hemoglobin : 32.2 pg  Mean Cell Hemoglobin Concentration : 33.1 g/dL  Auto Neutrophil # : 5.6 K/uL  Auto Lymphocyte # : 1.2 K/uL  Auto Monocyte # : 0.8 K/uL  Auto Eosinophil # : 0.2 K/uL  Auto Basophil # : 0.0 K/uL  Auto Neutrophil % : 71.2 %  Auto Lymphocyte % : 16.0 %  Auto Monocyte % : 9.9 %  Auto Eosinophil % : 2.2 %  Auto Basophil % : 0.4 %    05-25    141  |  106  |  13.0  ----------------------------<  101  3.5   |  22.0  |  0.75    Ca    7.9<L>      25 May 2018 06:15  Phos  2.1     05-25  Mg     1.9     05-25          ALLERGIES  No Known Allergies      MEDICATIONS   acetaminophen   Tablet. 650 milliGRAM(s) Oral every 6 hours  aspirin  chewable 81 milliGRAM(s) Oral daily  atorvastatin 10 milliGRAM(s) Oral at bedtime  bisacodyl Suppository 10 milliGRAM(s) Rectal daily PRN  docusate sodium 100 milliGRAM(s) Oral three times a day  donepezil 10 milliGRAM(s) Oral at bedtime  gabapentin 300 milliGRAM(s) Oral three times a day  heparin  Injectable 5000 Unit(s) SubCutaneous every 8 hours  HYDROmorphone  Injectable 0.5 milliGRAM(s) IV Push every 3 hours PRN  HYDROmorphone  Injectable 1 milliGRAM(s) IV Push every 3 hours PRN  lisinopril 5 milliGRAM(s) Oral daily  melatonin 5 milliGRAM(s) Oral at bedtime  multiple electrolytes Injection Type 1 1000 milliLiter(s) IV Continuous <Continuous>  pantoprazole    Tablet 40 milliGRAM(s) Oral before breakfast  senna 2 Tablet(s) Oral at bedtime  tamsulosin 0.4 milliGRAM(s) Oral at bedtime      ----------------------------------------------------------------------------------------  PHYSICAL EXAM  Constitutional - NAD, Comfortable  HEENT - NCAT, EOMI  Neck - Supple, No limited ROM  Chest - Breathing comfortably, No wheezing  Cardiovascular - S1S2   Abdomen - Soft< obese  Extremities - Right AKA, incision - CDI, Staples  Neurologic Exam -                    Cognitive - Awake, Alert, AAO to self only     Communication - Fluent, No dysarthria     Motor -   LEFT    UE - ShAB 5/5, EF 5/5, EE 5/5, WE 5/5,  5/5                    RIGHT UE - ShAB 5/5, EF 5/5, EE 5/5, WE 5/5,  5/5                    LEFT    LE - HF 1/5                    RIGHT LE - HF 3/5, KE 4/5, DF 5/5, PF 5/5        Sensory - Intact to LT  Psychiatric - Mood stable, Affect WNL  ----------------------------------------------------------------------------------------  ASSESSMENT/PLAN  88yMale with functional deficits after left AKA   Pain - Tylenol, Neurontin, Dilaudid  DVT PPX - SCDs, heparin  Rehab - Despite cognitive status, patient was independent prior. Recommend ACUTE inpatient rehabilitation for the functional deficits consisting of 3 hours of therapy/day & 24 hour RN/daily PMR physician for comorbid medical management. Will continue to follow for ongoing rehab needs and recommendations. Patient will be able to tolerate 3 hours a day. 88yM was admitted on 05-20 with a left cool and pulseless leg. He was recently diagnosed with a DVT s/p Coumadin. Workup also showed an elevated INR and Lactate with elevated SBP>200. Patient is s/p left AKA on 5/21 by .   He was downgraded from ICU yesterday.     Patient reports having left LE pain, but has no recall of the events that occurred. Poor ongoing memory. Able to follow commands. History of home environment appears to be consistent.     REVIEW OF SYSTEMS  Constitutional - No fever, No weight loss, No fatigue  HEENT - No eye pain, No visual disturbances, No difficulty hearing, No tinnitus, No vertigo, No neck pain  Respiratory - No cough, No wheezing, No shortness of breath  Cardiovascular - No chest pain, No palpitations  Gastrointestinal - No abdominal pain, No nausea, No vomiting, No diarrhea, No constipation  Genitourinary - No dysuria, No frequency, No hematuria, +incontinence  Neurological - No headaches, +memory loss, +loss of strength, No numbness, No tremors  Skin - No itching, No rashes, No lesions   Endocrine - No temperature intolerance  Musculoskeletal - +joint pain, +joint swelling, +muscle pain  Psychiatric - No depression, No anxiety    VITALS  T(C): 36.7 (05-25-18 @ 08:06), Max: 37.3 (05-24-18 @ 12:00)  HR: 80 (05-25-18 @ 08:06) (62 - 89)  BP: 149/71 (05-25-18 @ 08:06) (130/67 - 170/76)  RR: 18 (05-25-18 @ 08:06) (14 - 25)  SpO2: 95% (05-25-18 @ 08:06) (93% - 100%)  Wt(kg): --    PAST MEDICAL & SURGICAL HISTORY  DVT (deep venous thrombosis)  Cataract of both eyes  Prostate CA  AAA (abdominal aortic aneurysm)  Alzheimer disease  Hypertension  DVT (deep venous thrombosis)  AAA (abdominal aortic aneurysm)  Alzheimer disease  Cataract  Prostate ca  Gallstones  History of cataract surgery  H/O prostatectomy  History of cholecystectomy  H/O prostatectomy  Status post cataract surgery  History of cholecystectomy      SOCIAL HISTORY  Smoking - Denied  EtOH - Denied   Drugs - Denied    FUNCTIONAL HISTORY  Lives with wife, 4 SWAPNA  Independent with ?AD    CURRENT FUNCTIONAL STATUS  5/25  Bed Mobility  Bed Mobility Training Rolling/Turning: moderate assist (50% patient effort);  1 person assist;  verbal cues;  bed rails  Bed Mobility Training Scooting: moderate assist (50% patient effort);  1 person assist;  verbal cues;  bed rails  Bed Mobility Training Sit-to-Supine: moderate assist (50% patient effort);  1 person assist;  verbal cues;  bed rails  Bed Mobility Training Supine-to-Sit: maximum assist (25% patient effort);  1 person assist;  verbal cues;  bed rails  Bed Mobility Training Limitations: decreased ability to use arms for pushing/pulling;  impaired ability to control trunk for mobility;  decreased strength;  pain    Sit-Stand Transfer Training  Transfer Training Sit-to-Stand Transfer: unable to perform;  rolling walker  Sit-to-Stand Transfer Training Transfer Safety Analysis: decreased sequencing ability;  decreased weight-shifting ability;  decreased strength;  pain;  rolling walker    FAMILY HISTORY   No pertinent family history in first degree relatives  Family history of Alzheimer's disease (Sibling)  Family history of heart disease (Father)      RECENT LABS/IMAGING  CBC Full  -  ( 25 May 2018 06:15 )  WBC Count : 7.8 K/uL  Hemoglobin : 9.5 g/dL  Hematocrit : 28.7 %  Platelet Count - Automated : 195 K/uL  Mean Cell Volume : 97.3 fl  Mean Cell Hemoglobin : 32.2 pg  Mean Cell Hemoglobin Concentration : 33.1 g/dL  Auto Neutrophil # : 5.6 K/uL  Auto Lymphocyte # : 1.2 K/uL  Auto Monocyte # : 0.8 K/uL  Auto Eosinophil # : 0.2 K/uL  Auto Basophil # : 0.0 K/uL  Auto Neutrophil % : 71.2 %  Auto Lymphocyte % : 16.0 %  Auto Monocyte % : 9.9 %  Auto Eosinophil % : 2.2 %  Auto Basophil % : 0.4 %    05-25    141  |  106  |  13.0  ----------------------------<  101  3.5   |  22.0  |  0.75    Ca    7.9<L>      25 May 2018 06:15  Phos  2.1     05-25  Mg     1.9     05-25          ALLERGIES  No Known Allergies      MEDICATIONS   acetaminophen   Tablet. 650 milliGRAM(s) Oral every 6 hours  aspirin  chewable 81 milliGRAM(s) Oral daily  atorvastatin 10 milliGRAM(s) Oral at bedtime  bisacodyl Suppository 10 milliGRAM(s) Rectal daily PRN  docusate sodium 100 milliGRAM(s) Oral three times a day  donepezil 10 milliGRAM(s) Oral at bedtime  gabapentin 300 milliGRAM(s) Oral three times a day  heparin  Injectable 5000 Unit(s) SubCutaneous every 8 hours  HYDROmorphone  Injectable 0.5 milliGRAM(s) IV Push every 3 hours PRN  HYDROmorphone  Injectable 1 milliGRAM(s) IV Push every 3 hours PRN  lisinopril 5 milliGRAM(s) Oral daily  melatonin 5 milliGRAM(s) Oral at bedtime  multiple electrolytes Injection Type 1 1000 milliLiter(s) IV Continuous <Continuous>  pantoprazole    Tablet 40 milliGRAM(s) Oral before breakfast  senna 2 Tablet(s) Oral at bedtime  tamsulosin 0.4 milliGRAM(s) Oral at bedtime      ----------------------------------------------------------------------------------------  PHYSICAL EXAM  Constitutional - NAD, Comfortable  HEENT - NCAT, EOMI  Neck - Supple, No limited ROM  Chest - Breathing comfortably, No wheezing  Cardiovascular - S1S2   Abdomen - Soft< obese  Extremities - Right AKA, incision - CDI, Staples  Neurologic Exam -                    Cognitive - Awake, Alert, AAO to self only     Communication - Fluent, No dysarthria     Motor -   LEFT    UE - ShAB 5/5, EF 5/5, EE 5/5, WE 5/5,  5/5                    RIGHT UE - ShAB 5/5, EF 5/5, EE 5/5, WE 5/5,  5/5                    LEFT    LE - HF 1/5                    RIGHT LE - HF 3/5, KE 4/5, DF 5/5, PF 5/5        Sensory - Intact to LT  Psychiatric - Mood stable, Affect WNL  ----------------------------------------------------------------------------------------  ASSESSMENT/PLAN  88yMale with functional deficits after left AKA   Pain - Tylenol, Neurontin, Dilaudid  DVT PPX - SCDs, heparin  Rehab - Despite cognitive status, patient was independent prior. Do not expect patient to have a prosthesis long term, but short term would need rehab to assist with transfers for  as his primary mode of ambulation. Recommend ACUTE inpatient rehabilitation for the functional deficits consisting of 3 hours of therapy/day & 24 hour RN/daily PMR physician for comorbid medical management. Will continue to follow for ongoing rehab needs and recommendations. Patient will be able to tolerate 3 hours a day.

## 2018-05-25 NOTE — DISCHARGE NOTE ADULT - MEDICATION SUMMARY - MEDICATIONS TO TAKE
I will START or STAY ON the medications listed below when I get home from the hospital:    acetaminophen 325 mg oral tablet  -- 2 tab(s) by mouth every 6 hours  -- Indication: For PAIN    aspirin 81 mg oral tablet  -- 1 tab(s) by mouth once a day  -- Indication: For PAD    lisinopril 5 mg oral tablet  -- 1 tab(s) by mouth once a day  -- Indication: For HTN    tamsulosin 0.4 mg oral capsule  -- 1 cap(s) by mouth once a day (at bedtime)  -- Indication: For BPH    heparin  -- 5000 unit(s) subcutaneous every 8 hours  -- Indication: For DVT (deep venous thrombosis)    gabapentin 300 mg oral capsule  -- 1 cap(s) by mouth 3 times a day  -- Indication: For PAIN    atorvastatin 10 mg oral tablet  -- 1 tab(s) by mouth once a day  -- Indication: For HLD    donepezil 10 mg oral tablet  -- 1 tab(s) by mouth 2 times a day  -- Indication: For DEMENTIA    senna oral tablet  -- 2 tab(s) by mouth once a day (at bedtime)  -- Indication: For BOWEL    docusate sodium 100 mg oral capsule  -- 1 cap(s) by mouth 3 times a day  -- Indication: For BOWEL    melatonin 5 mg oral tablet  -- 1 tab(s) by mouth once a day (at bedtime)  -- Indication: For SLEEP    pantoprazole 40 mg oral delayed release tablet  -- 1 tab(s) by mouth once a day (before a meal)  -- Indication: For GERD    Ocuvite oral tablet  -- 1 tab(s) by mouth once a day  -- Indication: For EYES I will START or STAY ON the medications listed below when I get home from the hospital:    acetaminophen 325 mg oral tablet  -- 2 tab(s) by mouth every 6 hours  -- Indication: For PAIN    aspirin 81 mg oral tablet  -- 1 tab(s) by mouth once a day  -- Indication: For PAD    lisinopril 5 mg oral tablet  -- 1 tab(s) by mouth once a day  -- Indication: For HTN    tamsulosin 0.4 mg oral capsule  -- 1 cap(s) by mouth once a day (at bedtime)  -- Indication: For BPH    heparin  -- 5000 unit(s) subcutaneous every 8 hours  -- Indication: For DVT (deep venous thrombosis)    apixaban 2.5 mg oral tablet  -- 1 tab(s) by mouth every 12 hours  -- Indication: For Afib and PAF     gabapentin 300 mg oral capsule  -- 1 cap(s) by mouth 3 times a day  -- Indication: For PAIN    atorvastatin 10 mg oral tablet  -- 1 tab(s) by mouth once a day  -- Indication: For HLD    amLODIPine 5 mg oral tablet  -- 1 tab(s) by mouth once a day  -- Indication: For HTN    cefTRIAXone 1 g intravenous injection  -- 1 gram(s) intravenous every 24 hours  -- Indication: For Urosepsis, continue to complete till 6/13/2018     donepezil 10 mg oral tablet  -- 1 tab(s) by mouth 2 times a day  -- Indication: For DEMENTIA    senna oral tablet  -- 2 tab(s) by mouth once a day (at bedtime)  -- Indication: For BOWEL    docusate sodium 100 mg oral capsule  -- 1 cap(s) by mouth 3 times a day  -- Indication: For BOWEL    melatonin 5 mg oral tablet  -- 1 tab(s) by mouth once a day (at bedtime)  -- Indication: For SLEEP    pantoprazole 40 mg oral delayed release tablet  -- 1 tab(s) by mouth once a day (before a meal)  -- Indication: For GERD    Ocuvite oral tablet  -- 1 tab(s) by mouth once a day  -- Indication: For EYES I will START or STAY ON the medications listed below when I get home from the hospital:    acetaminophen 325 mg oral tablet  -- 2 tab(s) by mouth every 6 hours  -- Indication: For PAIN    aspirin 81 mg oral tablet  -- 1 tab(s) by mouth once a day  -- Indication: For PAD    lisinopril 5 mg oral tablet  -- 1 tab(s) by mouth once a day  -- Indication: For HTN    tamsulosin 0.4 mg oral capsule  -- 1 cap(s) by mouth once a day (at bedtime)  -- Indication: For BPH    apixaban 2.5 mg oral tablet  -- 1 tab(s) by mouth every 12 hours  -- Indication: For Afib and PAF     gabapentin 300 mg oral capsule  -- 1 cap(s) by mouth 3 times a day  -- Indication: For PAIN    atorvastatin 10 mg oral tablet  -- 1 tab(s) by mouth once a day  -- Indication: For HLD    amLODIPine 5 mg oral tablet  -- 1 tab(s) by mouth once a day  -- Indication: For HTN    cefTRIAXone 1 g intravenous injection  -- 1 gram(s) intravenous every 24 hours  -- Indication: For Urosepsis, continue to complete till 6/13/2018     donepezil 10 mg oral tablet  -- 1 tab(s) by mouth 2 times a day  -- Indication: For DEMENTIA    senna oral tablet  -- 2 tab(s) by mouth once a day (at bedtime)  -- Indication: For BOWEL    docusate sodium 100 mg oral capsule  -- 1 cap(s) by mouth 3 times a day  -- Indication: For BOWEL    melatonin 5 mg oral tablet  -- 1 tab(s) by mouth once a day (at bedtime)  -- Indication: For SLEEP    pantoprazole 40 mg oral delayed release tablet  -- 1 tab(s) by mouth once a day (before a meal)  -- Indication: For GERD    Ocuvite oral tablet  -- 1 tab(s) by mouth once a day  -- Indication: For EYES

## 2018-05-25 NOTE — DISCHARGE NOTE ADULT - NS AS ACTIVITY OBS
No Heavy lifting/straining/Walking-Outdoors allowed/Stairs allowed/Walking-Indoors allowed/Showering allowed

## 2018-05-25 NOTE — DISCHARGE NOTE ADULT - PLAN OF CARE
RETURN TO BASELINE LEVEL OF FUNCTIONING May shower daily. Remove dressing prior. Wash incision with soap and water and pat dry. Leave open to air if no drainage noted. No ointments, powders or creams to incision. Monitor incision for any redness, swelling or drainage and call office immediately with any concerns. Follow up with Dr Fry in 2 weeks. May shower daily. Remove dressing prior. Wash incision with soap and water and pat dry. Leave open to air if no drainage noted. No ointments, powders or creams to incision. Monitor incision for any redness, swelling or drainage and call office immediately with any concerns. Follow up with Dr Fry in 2 weeks.  Patient is to complete IV antibiotics therapy for urosepsis till 6/13.

## 2018-05-25 NOTE — DISCHARGE NOTE ADULT - PATIENT PORTAL LINK FT
You can access the [facility] Patient Portal, offered by [facility], by registering with the following website: [email or web address]

## 2018-05-25 NOTE — DISCHARGE NOTE ADULT - CARE PLAN
Principal Discharge DX:	Limb ischemia  Goal:	RETURN TO BASELINE LEVEL OF FUNCTIONING  Assessment and plan of treatment:	May shower daily. Remove dressing prior. Wash incision with soap and water and pat dry. Leave open to air if no drainage noted. No ointments, powders or creams to incision. Monitor incision for any redness, swelling or drainage and call office immediately with any concerns. Follow up with Dr Fry in 2 weeks. Principal Discharge DX:	Limb ischemia  Goal:	RETURN TO BASELINE LEVEL OF FUNCTIONING  Assessment and plan of treatment:	May shower daily. Remove dressing prior. Wash incision with soap and water and pat dry. Leave open to air if no drainage noted. No ointments, powders or creams to incision. Monitor incision for any redness, swelling or drainage and call office immediately with any concerns. Follow up with Dr Fry in 2 weeks.  Patient is to complete IV antibiotics therapy for urosepsis till 6/13.

## 2018-05-25 NOTE — DISCHARGE NOTE ADULT - CARE PROVIDER_API CALL
Grayson Fry (MD), Surgery  [address]  [address]  [place] [address]  Phone: [phone]  Fax: [phone]

## 2018-05-25 NOTE — PROGRESS NOTE ADULT - SUBJECTIVE AND OBJECTIVE BOX
Patient is a 88y old  Male who presents with a chief complaint of cold L leg (20 May 2018 06:48)    Pt is S/P  L AKA       POD# 4  No acute events overnight  Feels well with minimal discomfort    Vital Signs Last 24 Hrs  T(C): 36.7 (25 May 2018 08:06), Max: 37.1 (25 May 2018 00:40)  T(F): 98.1 (25 May 2018 08:06), Max: 98.8 (25 May 2018 00:40)  HR: 80 (25 May 2018 08:06) (62 - 89)  BP: 149/71 (25 May 2018 08:06) (130/67 - 170/76)  BP(mean): 98 (24 May 2018 18:00) (93 - 109)  RR: 18 (25 May 2018 08:06) (17 - 25)  SpO2: 95% (25 May 2018 08:06) (93% - 100%)      MEDICATIONS  (STANDING):  acetaminophen   Tablet. 650 milliGRAM(s) Oral every 6 hours  aspirin  chewable 81 milliGRAM(s) Oral daily  atorvastatin 10 milliGRAM(s) Oral at bedtime  docusate sodium 100 milliGRAM(s) Oral three times a day  donepezil 10 milliGRAM(s) Oral at bedtime  gabapentin 300 milliGRAM(s) Oral three times a day  heparin  Injectable 5000 Unit(s) SubCutaneous every 8 hours  lisinopril 5 milliGRAM(s) Oral daily  melatonin 5 milliGRAM(s) Oral at bedtime  multiple electrolytes Injection Type 1 1000 milliLiter(s) (100 mL/Hr) IV Continuous <Continuous>  pantoprazole    Tablet 40 milliGRAM(s) Oral before breakfast  senna 2 Tablet(s) Oral at bedtime  tamsulosin 0.4 milliGRAM(s) Oral at bedtime    MEDICATIONS  (PRN):  bisacodyl Suppository 10 milliGRAM(s) Rectal daily PRN Constipation  HYDROmorphone  Injectable 0.5 milliGRAM(s) IV Push every 3 hours PRN Moderate Pain (4 - 6)  HYDROmorphone  Injectable 1 milliGRAM(s) IV Push every 3 hours PRN Severe Pain (7 - 10)    PAST MEDICAL & SURGICAL HISTORY:  DVT (deep venous thrombosis)  Cataract of both eyes  Prostate CA  AAA (abdominal aortic aneurysm)  Alzheimer disease  Hypertension  DVT (deep venous thrombosis)  AAA (abdominal aortic aneurysm): 5cm  Right iliac aneurysm 4cm  Alzheimer disease  Cataract  Prostate ca  Gallstones  History of cataract surgery  H/O prostatectomy  History of cholecystectomy  H/O prostatectomy: 1998  Status post cataract surgery: bilateral  History of cholecystectomy: 1997    Physical Exam:  General: NAD, resting comfortably in bed  Extremities: L AKA dressing with small amt serosang fluid, ecchymotic post thigh, staples intact, swelling improved L thigh                                                   LABS:                        9.5    7.8   )-----------( 195      ( 25 May 2018 06:15 )             28.7   05-25    141  |  106  |  13.0  ----------------------------<  101  3.5   |  22.0  |  0.75    Ca    7.9<L>      25 May 2018 06:15  Phos  2.1     05-25  Mg     1.9     05-25      Radiology and Additional Studies:  < from: CT Angio Abd Aorta w/run-off w/ IV Cont (05.20.18 @ 00:50) >  EXAM:  CT ANGIO ABD AOR W RUN(W)AW IC                          PROCEDURE DATE:  05/20/2018    EXAM:  CT Angiography Abdomen and Pelvis With Runoff to the Lower   Extremities   With Intravenous Contrast  CLINICAL HISTORY:  88 years old, male; Signs and symptoms; Numbness;   Lower   extremity; Left  TECHNIQUE:  Axial computed tomographic angiography images of the abdomen,   pelvis and lower extremities with intravenous contrast using CT   angiography   protocol.  MIP reconstructed images were created and reviewed.  Coronal   and   sagittal reformatted images were created and reviewed.  COMPARISON:  None currently available.      FINDINGS: Infrarenal abdominal aortic aneurysm measures 4.7 x 5.4 cm.  No   rupture.  Thick concentric mural thrombus lines the aneurysmal segment,   there   are small ulcerations in the thrombus.  Thin irregular mural thrombus   along the   visualized distal descending thoracic aorta and suprarenal abdominal   aorta.    The celiac trunk and its major branches are patent.  The SMA has a mild   stenosis at its origin, otherwise appears patent.  Mild to moderate left   renal   artery stenosis, the right renal artery is grossly patent.    Right leg: Aneurysmal change of the common iliac artery measuring 4.6 x   4.9 cm,   large crescent shape mural thrombus in the aneurysmal segment.  Mild   aneurysmal   change of the internal iliac artery measuring 1.3 cm.  The external iliac   artery is very tortuous with a mild stenosis at its origin.  The common   femoral, profunda femoral and superficial femoral arteries are widely   patent   with scattered calcifications.  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  A second smaller distal   popliteal   artery aneurysm with concentric thrombus measures 2.2 x 2.8 cm.  No   rupture of   either aneurysm.  Faint enhancement of the anterior and posterior tibial   and   peroneal arteries.  Multiple calcifications along these arteries.  The   dorsalis   pedis and plantar arteries also faintly enhance.  Subcutaneous edema of   the   foot, ankle and calf.    Left leg: Aneurysmal change of the common iliac artery measures 3.0 cm   across.    Aneurysmal change of the internal iliac artery measures 2.0 cm across.    The   external iliac, common femoral and profunda femoral arteries are widely   patent.    The lumen of the superficial femoral artery becomes quite irregular and   stenotic about 5-6 cm from its origin and eventually occludes at the   adductor   canal, uncertain if the irregular filling defects in the SFA lumen   represent   irregular mural thrombus versus embolic clot.  There is no reconstitution   of   distal arteries.  Aneurysmal change ofthe popliteal artery measures 4.2   x 5.0   cm.  Densely calcified trifurcation arteries.  No rupture of the   aneurysms.    Mild subcutaneous edema of the left calf and foot.    Small hiatal hernia.  No small bowel obstruction.  No appendix seen, no  secondary signs of appendicitis.  Diverticula in the distal descending   colon   and proximal sigmoid colon.  No diverticulitis.  Wall thickening of the   rectosigmoid.  No free fluid or free air.      Normal liver.  Cholecystectomy.      Normal pancreas.      Normal spleen.      Normal adrenal glands.  Irregular, lobulated kidneys.  Moderate left   renal   atrophy.  5 x 7 mm nonobstructing calcified stone in the mid left kidney.    Mild   left hydronephrosis, and no obstructing calcified stoneseen.  No right   hydronephrosis.Normal appearing bladder.  Prior prostate surgery,   multiple   surgical clips in the pelvis.    Degenerative changes and osteopenia.      Mild dependent atelectasis bilaterally.  Otherwise well-aerated lung   bases.    IMPRESSION:         Please see above discussion.  Multiple irregular filling defects lie in   the   lumen of the left superficial femoral artery, the left SFA occludes by   the   adductor canal.  There is no reconstitution of distal arteries of the   left leg,   concerning for severe arterial compromise to the left calf and foot.    Unknown   if the irregular filling defects in the left SFA represent irregular   mural   thrombus and severe vascular disease versus thromboemboli.  Vascular   surgical   consult recommended.    Aneurysmal change of the infrarenal abdominal aorta, the common iliac   arteries   bilaterally, the internal iliac arteries bilaterally and the popliteal   arteries   bilaterally as described above.  No rupture.    Mild left hydronephrosis, unknown etiology.    Diverticula in the distal descending and proximal sigmoid colon, no   diverticulitis.      EXAM:  US DPLX LWR EXT VEINS LTD LT                          PROCEDURE DATE:  05/22/2018      INTERPRETATION:  CLINICAL INFORMATION: Status post above-knee amputation   with left eye swelling evaluate for femoral vein thrombosis. COMPARISON:   None available.    TECHNIQUE: Duplex sonography of the LEFT LOWER extremity with color and   spectral Doppler, with and without compression.      FINDINGS:    There is normal compressibility of the left common femoral, femoral.   Greater saphenous vein patent.        Doppler examination shows normal spontaneous and phasic flow.    IMPRESSION:     No evidence of left lower extremity deep venous thrombosis.        Assessment: 88 year old male w/PMH of Alzheimers, prostate ca s/p prostatectomy transferred here from [place] with pulselessness, pain and numbness to [facility] that developed around 9pm . Patients family reports a recent diagnosis of DVT to [facility] and started Lovenox / coumadin therapy about 10 days ago with a recent INR of 2.3 a few days ago, however INR - 4.2 on repeat labs here . Lactate uptrending from 2.6 to 4.7 . Pt comes in on cardene gtt for SBP > 200 .      S/P  L AKA       POD#4  R iliac and popliteal artery aneurysms  Infrarenal AAA    Plan:  Cont ASA and Statin  OOB/Ambulate/PT consult-Acute rehab recommended  Daily dressing changes DSD  Medically stable for dc if acute rehab bed available

## 2018-05-26 RX ADMIN — SODIUM CHLORIDE 100 MILLILITER(S): 9 INJECTION, SOLUTION INTRAVENOUS at 06:28

## 2018-05-26 RX ADMIN — TAMSULOSIN HYDROCHLORIDE 0.4 MILLIGRAM(S): 0.4 CAPSULE ORAL at 22:12

## 2018-05-26 RX ADMIN — HEPARIN SODIUM 5000 UNIT(S): 5000 INJECTION INTRAVENOUS; SUBCUTANEOUS at 22:13

## 2018-05-26 RX ADMIN — SENNA PLUS 2 TABLET(S): 8.6 TABLET ORAL at 22:12

## 2018-05-26 RX ADMIN — GABAPENTIN 300 MILLIGRAM(S): 400 CAPSULE ORAL at 18:20

## 2018-05-26 RX ADMIN — Medication 650 MILLIGRAM(S): at 22:12

## 2018-05-26 RX ADMIN — PANTOPRAZOLE SODIUM 40 MILLIGRAM(S): 20 TABLET, DELAYED RELEASE ORAL at 05:48

## 2018-05-26 RX ADMIN — Medication 100 MILLIGRAM(S): at 22:12

## 2018-05-26 RX ADMIN — Medication 81 MILLIGRAM(S): at 12:23

## 2018-05-26 RX ADMIN — ATORVASTATIN CALCIUM 10 MILLIGRAM(S): 80 TABLET, FILM COATED ORAL at 22:12

## 2018-05-26 RX ADMIN — Medication 650 MILLIGRAM(S): at 13:21

## 2018-05-26 RX ADMIN — GABAPENTIN 300 MILLIGRAM(S): 400 CAPSULE ORAL at 05:48

## 2018-05-26 RX ADMIN — Medication 650 MILLIGRAM(S): at 23:00

## 2018-05-26 RX ADMIN — SODIUM CHLORIDE 100 MILLILITER(S): 9 INJECTION, SOLUTION INTRAVENOUS at 18:18

## 2018-05-26 RX ADMIN — Medication 100 MILLIGRAM(S): at 05:49

## 2018-05-26 RX ADMIN — GABAPENTIN 300 MILLIGRAM(S): 400 CAPSULE ORAL at 22:12

## 2018-05-26 RX ADMIN — HEPARIN SODIUM 5000 UNIT(S): 5000 INJECTION INTRAVENOUS; SUBCUTANEOUS at 05:48

## 2018-05-26 RX ADMIN — Medication 650 MILLIGRAM(S): at 05:48

## 2018-05-26 RX ADMIN — Medication 5 MILLIGRAM(S): at 22:12

## 2018-05-26 RX ADMIN — LISINOPRIL 5 MILLIGRAM(S): 2.5 TABLET ORAL at 05:48

## 2018-05-26 RX ADMIN — Medication 650 MILLIGRAM(S): at 18:20

## 2018-05-26 RX ADMIN — Medication 650 MILLIGRAM(S): at 12:21

## 2018-05-26 RX ADMIN — Medication 100 MILLIGRAM(S): at 18:20

## 2018-05-26 RX ADMIN — DONEPEZIL HYDROCHLORIDE 10 MILLIGRAM(S): 10 TABLET, FILM COATED ORAL at 22:12

## 2018-05-26 RX ADMIN — HEPARIN SODIUM 5000 UNIT(S): 5000 INJECTION INTRAVENOUS; SUBCUTANEOUS at 18:19

## 2018-05-26 RX ADMIN — Medication 650 MILLIGRAM(S): at 06:36

## 2018-05-26 RX ADMIN — Medication 650 MILLIGRAM(S): at 19:20

## 2018-05-27 RX ADMIN — HYDROMORPHONE HYDROCHLORIDE 0.5 MILLIGRAM(S): 2 INJECTION INTRAMUSCULAR; INTRAVENOUS; SUBCUTANEOUS at 18:26

## 2018-05-27 RX ADMIN — GABAPENTIN 300 MILLIGRAM(S): 400 CAPSULE ORAL at 15:52

## 2018-05-27 RX ADMIN — Medication 650 MILLIGRAM(S): at 13:04

## 2018-05-27 RX ADMIN — Medication 650 MILLIGRAM(S): at 12:04

## 2018-05-27 RX ADMIN — HEPARIN SODIUM 5000 UNIT(S): 5000 INJECTION INTRAVENOUS; SUBCUTANEOUS at 05:24

## 2018-05-27 RX ADMIN — Medication 650 MILLIGRAM(S): at 16:32

## 2018-05-27 RX ADMIN — HEPARIN SODIUM 5000 UNIT(S): 5000 INJECTION INTRAVENOUS; SUBCUTANEOUS at 15:52

## 2018-05-27 RX ADMIN — GABAPENTIN 300 MILLIGRAM(S): 400 CAPSULE ORAL at 05:25

## 2018-05-27 RX ADMIN — Medication 100 MILLIGRAM(S): at 21:24

## 2018-05-27 RX ADMIN — Medication 100 MILLIGRAM(S): at 15:52

## 2018-05-27 RX ADMIN — Medication 5 MILLIGRAM(S): at 21:23

## 2018-05-27 RX ADMIN — Medication 650 MILLIGRAM(S): at 05:24

## 2018-05-27 RX ADMIN — TAMSULOSIN HYDROCHLORIDE 0.4 MILLIGRAM(S): 0.4 CAPSULE ORAL at 21:24

## 2018-05-27 RX ADMIN — GABAPENTIN 300 MILLIGRAM(S): 400 CAPSULE ORAL at 21:23

## 2018-05-27 RX ADMIN — ATORVASTATIN CALCIUM 10 MILLIGRAM(S): 80 TABLET, FILM COATED ORAL at 21:24

## 2018-05-27 RX ADMIN — HEPARIN SODIUM 5000 UNIT(S): 5000 INJECTION INTRAVENOUS; SUBCUTANEOUS at 21:26

## 2018-05-27 RX ADMIN — Medication 650 MILLIGRAM(S): at 17:32

## 2018-05-27 RX ADMIN — LISINOPRIL 5 MILLIGRAM(S): 2.5 TABLET ORAL at 05:25

## 2018-05-27 RX ADMIN — Medication 650 MILLIGRAM(S): at 21:24

## 2018-05-27 RX ADMIN — PANTOPRAZOLE SODIUM 40 MILLIGRAM(S): 20 TABLET, DELAYED RELEASE ORAL at 05:25

## 2018-05-27 RX ADMIN — SENNA PLUS 2 TABLET(S): 8.6 TABLET ORAL at 21:23

## 2018-05-27 RX ADMIN — HYDROMORPHONE HYDROCHLORIDE 0.5 MILLIGRAM(S): 2 INJECTION INTRAMUSCULAR; INTRAVENOUS; SUBCUTANEOUS at 19:00

## 2018-05-27 RX ADMIN — DONEPEZIL HYDROCHLORIDE 10 MILLIGRAM(S): 10 TABLET, FILM COATED ORAL at 21:24

## 2018-05-27 RX ADMIN — Medication 100 MILLIGRAM(S): at 05:24

## 2018-05-27 RX ADMIN — Medication 81 MILLIGRAM(S): at 12:04

## 2018-05-28 PROCEDURE — 99223 1ST HOSP IP/OBS HIGH 75: CPT

## 2018-05-28 RX ADMIN — GABAPENTIN 300 MILLIGRAM(S): 400 CAPSULE ORAL at 15:00

## 2018-05-28 RX ADMIN — ATORVASTATIN CALCIUM 10 MILLIGRAM(S): 80 TABLET, FILM COATED ORAL at 21:40

## 2018-05-28 RX ADMIN — SENNA PLUS 2 TABLET(S): 8.6 TABLET ORAL at 21:40

## 2018-05-28 RX ADMIN — LISINOPRIL 5 MILLIGRAM(S): 2.5 TABLET ORAL at 05:19

## 2018-05-28 RX ADMIN — Medication 100 MILLIGRAM(S): at 05:19

## 2018-05-28 RX ADMIN — PANTOPRAZOLE SODIUM 40 MILLIGRAM(S): 20 TABLET, DELAYED RELEASE ORAL at 05:19

## 2018-05-28 RX ADMIN — HEPARIN SODIUM 5000 UNIT(S): 5000 INJECTION INTRAVENOUS; SUBCUTANEOUS at 22:31

## 2018-05-28 RX ADMIN — Medication 100 MILLIGRAM(S): at 15:00

## 2018-05-28 RX ADMIN — Medication 650 MILLIGRAM(S): at 09:56

## 2018-05-28 RX ADMIN — Medication 650 MILLIGRAM(S): at 15:30

## 2018-05-28 RX ADMIN — DONEPEZIL HYDROCHLORIDE 10 MILLIGRAM(S): 10 TABLET, FILM COATED ORAL at 21:40

## 2018-05-28 RX ADMIN — Medication 650 MILLIGRAM(S): at 22:30

## 2018-05-28 RX ADMIN — GABAPENTIN 300 MILLIGRAM(S): 400 CAPSULE ORAL at 21:40

## 2018-05-28 RX ADMIN — Medication 650 MILLIGRAM(S): at 05:19

## 2018-05-28 RX ADMIN — Medication 5 MILLIGRAM(S): at 21:40

## 2018-05-28 RX ADMIN — TAMSULOSIN HYDROCHLORIDE 0.4 MILLIGRAM(S): 0.4 CAPSULE ORAL at 21:40

## 2018-05-28 RX ADMIN — Medication 100 MILLIGRAM(S): at 21:40

## 2018-05-28 RX ADMIN — Medication 650 MILLIGRAM(S): at 21:39

## 2018-05-28 RX ADMIN — SODIUM CHLORIDE 100 MILLILITER(S): 9 INJECTION, SOLUTION INTRAVENOUS at 05:19

## 2018-05-28 RX ADMIN — Medication 650 MILLIGRAM(S): at 09:18

## 2018-05-28 RX ADMIN — HEPARIN SODIUM 5000 UNIT(S): 5000 INJECTION INTRAVENOUS; SUBCUTANEOUS at 05:19

## 2018-05-28 RX ADMIN — HEPARIN SODIUM 5000 UNIT(S): 5000 INJECTION INTRAVENOUS; SUBCUTANEOUS at 15:00

## 2018-05-28 RX ADMIN — Medication 81 MILLIGRAM(S): at 12:14

## 2018-05-28 RX ADMIN — Medication 650 MILLIGRAM(S): at 15:01

## 2018-05-28 RX ADMIN — GABAPENTIN 300 MILLIGRAM(S): 400 CAPSULE ORAL at 05:19

## 2018-05-28 NOTE — PROGRESS NOTE ADULT - SUBJECTIVE AND OBJECTIVE BOX
SOSA PERRY    [id number]    History:  The patient is status post Left AKA on 5/21. Patient is doing well. Waiting for placement in Acute Rehab. The patient's pain is controlled using the prescribed pain medications. The patient is participating in physical therapy. Denies nausea, vomiting, chest pain, shortness of breath, abdominal pain or fever. No new complaints. No acute motor or sensory changes are reported.    Vital Signs Last 24 Hrs  T(C): 36.9 (27 May 2018 23:29), Max: 36.9 (27 May 2018 16:00)  T(F): 98.4 (27 May 2018 23:29), Max: 98.4 (27 May 2018 16:00)  HR: 80 (27 May 2018 23:29) (79 - 83)  BP: 157/72 (27 May 2018 23:29) (143/77 - 157/72)  BP(mean): --  RR: 18 (27 May 2018 23:29) (18 - 18)  SpO2: 95% (27 May 2018 16:00) (95% - 95%)  I&O's Summary    26 May 2018 07:01  -  27 May 2018 07:00  --------------------------------------------------------  IN: 1100 mL / OUT: 1950 mL / NET: -850 mL    27 May 2018 07:01  -  28 May 2018 06:51  --------------------------------------------------------  IN: 1100 mL / OUT: 1150 mL / NET: -50 mL                  MEDICATIONS  (STANDING):  acetaminophen   Tablet. 650 milliGRAM(s) Oral every 6 hours  aspirin  chewable 81 milliGRAM(s) Oral daily  atorvastatin 10 milliGRAM(s) Oral at bedtime  docusate sodium 100 milliGRAM(s) Oral three times a day  donepezil 10 milliGRAM(s) Oral at bedtime  gabapentin 300 milliGRAM(s) Oral three times a day  heparin  Injectable 5000 Unit(s) SubCutaneous every 8 hours  lisinopril 5 milliGRAM(s) Oral daily  melatonin 5 milliGRAM(s) Oral at bedtime  multiple electrolytes Injection Type 1 1000 milliLiter(s) (100 mL/Hr) IV Continuous <Continuous>  pantoprazole    Tablet 40 milliGRAM(s) Oral before breakfast  senna 2 Tablet(s) Oral at bedtime  tamsulosin 0.4 milliGRAM(s) Oral at bedtime    MEDICATIONS  (PRN):  bisacodyl Suppository 10 milliGRAM(s) Rectal daily PRN Constipation  HYDROmorphone  Injectable 0.5 milliGRAM(s) IV Push every 3 hours PRN Moderate Pain (4 - 6)  HYDROmorphone  Injectable 1 milliGRAM(s) IV Push every 3 hours PRN Severe Pain (7 - 10)      Physical exam: Left AKA stump with reducing edema. The dressing is clean, dry and intact. No periwound erythema, discharge, drainage is noted. Sensation to light touch is grossly intact without focal deficit and is symmetric bilaterally. No calf tenderness to right. Sensation to light touch is grossly intact distally. Right Motor function distally is grossly intact. No foot drop. Right 3+ popliteal, 2+ dorsalis pedis pulse. Capillary refill is less than 2 seconds. No cyanosis.  Abd soft, obese  chest clear        Assessment:  • s/p left AKA due to late stage, non salvageable arterial insufficiency      Hist of aorto-iliac and popliteal aneurysms   •   Secondary  Medical Assessment(s):   •   Plan:   • Continue current management   - discharge to acute rehab once bed is available  - patient is to return to our office for outpatient surgical planning for his aorto-iliac/popliteal aneurysms

## 2018-05-29 DIAGNOSIS — R09.02 HYPOXEMIA: ICD-10-CM

## 2018-05-29 DIAGNOSIS — R19.7 DIARRHEA, UNSPECIFIED: ICD-10-CM

## 2018-05-29 DIAGNOSIS — R14.0 ABDOMINAL DISTENSION (GASEOUS): ICD-10-CM

## 2018-05-29 DIAGNOSIS — R65.10 SYSTEMIC INFLAMMATORY RESPONSE SYNDROME (SIRS) OF NON-INFECTIOUS ORIGIN WITHOUT ACUTE ORGAN DYSFUNCTION: ICD-10-CM

## 2018-05-29 LAB
-  CANDIDA ALBICANS: SIGNIFICANT CHANGE UP
-  CANDIDA GLABRATA: SIGNIFICANT CHANGE UP
-  CANDIDA KRUSEI: SIGNIFICANT CHANGE UP
-  CANDIDA PARAPSILOSIS: SIGNIFICANT CHANGE UP
-  CANDIDA TROPICALIS: SIGNIFICANT CHANGE UP
-  COAGULASE NEGATIVE STAPHYLOCOCCUS: SIGNIFICANT CHANGE UP
-  K. PNEUMONIAE GROUP: SIGNIFICANT CHANGE UP
-  KPC RESISTANCE GENE: SIGNIFICANT CHANGE UP
-  STREPTOCOCCUS SP. (NOT GRP A, B OR S PNEUMONIAE): SIGNIFICANT CHANGE UP
A BAUMANNII DNA SPEC QL NAA+PROBE: SIGNIFICANT CHANGE UP
ALBUMIN SERPL ELPH-MCNC: 2.6 G/DL — LOW (ref 3.3–5.2)
ALP SERPL-CCNC: 103 U/L — SIGNIFICANT CHANGE UP (ref 40–120)
ALT FLD-CCNC: 43 U/L — HIGH
ANION GAP SERPL CALC-SCNC: 16 MMOL/L — SIGNIFICANT CHANGE UP (ref 5–17)
ANISOCYTOSIS BLD QL: SLIGHT — SIGNIFICANT CHANGE UP
APTT BLD: 32.1 SEC — SIGNIFICANT CHANGE UP (ref 27.5–37.4)
APTT BLD: >200 SEC — CRITICAL HIGH (ref 27.5–37.4)
AST SERPL-CCNC: 42 U/L — HIGH
BILIRUB SERPL-MCNC: 0.9 MG/DL — SIGNIFICANT CHANGE UP (ref 0.4–2)
BUN SERPL-MCNC: 11 MG/DL — SIGNIFICANT CHANGE UP (ref 8–20)
C DIFF BY PCR RESULT: SIGNIFICANT CHANGE UP
C DIFF TOX GENS STL QL NAA+PROBE: SIGNIFICANT CHANGE UP
CALCIUM SERPL-MCNC: 8 MG/DL — LOW (ref 8.6–10.2)
CHLORIDE SERPL-SCNC: 104 MMOL/L — SIGNIFICANT CHANGE UP (ref 98–107)
CO2 SERPL-SCNC: 22 MMOL/L — SIGNIFICANT CHANGE UP (ref 22–29)
CREAT SERPL-MCNC: 0.84 MG/DL — SIGNIFICANT CHANGE UP (ref 0.5–1.3)
E CLOAC COMP DNA BLD POS QL NAA+PROBE: SIGNIFICANT CHANGE UP
E COLI DNA BLD POS QL NAA+NON-PROBE: SIGNIFICANT CHANGE UP
ELLIPTOCYTES BLD QL SMEAR: SLIGHT — SIGNIFICANT CHANGE UP
ENTEROCOC DNA BLD POS QL NAA+NON-PROBE: SIGNIFICANT CHANGE UP
ENTEROCOC DNA BLD POS QL NAA+NON-PROBE: SIGNIFICANT CHANGE UP
GAS PNL BLDA: SIGNIFICANT CHANGE UP
GLUCOSE BLDC GLUCOMTR-MCNC: 123 MG/DL — HIGH (ref 70–99)
GLUCOSE BLDC GLUCOMTR-MCNC: 125 MG/DL — HIGH (ref 70–99)
GLUCOSE SERPL-MCNC: 107 MG/DL — SIGNIFICANT CHANGE UP (ref 70–115)
GP B STREP DNA BLD POS QL NAA+NON-PROBE: SIGNIFICANT CHANGE UP
HAEM INFLU DNA BLD POS QL NAA+NON-PROBE: SIGNIFICANT CHANGE UP
HCT VFR BLD CALC: 32.2 % — LOW (ref 42–52)
HGB BLD-MCNC: 10.3 G/DL — LOW (ref 14–18)
HYPOCHROMIA BLD QL: SLIGHT — SIGNIFICANT CHANGE UP
K OXYTOCA DNA BLD POS QL NAA+NON-PROBE: SIGNIFICANT CHANGE UP
L MONOCYTOG DNA BLD POS QL NAA+NON-PROBE: SIGNIFICANT CHANGE UP
LACTATE BLDV-MCNC: 1.4 MMOL/L — SIGNIFICANT CHANGE UP (ref 0.5–2)
LACTATE BLDV-MCNC: 2.5 MMOL/L — HIGH (ref 0.5–2)
LACTATE BLDV-MCNC: 2.5 MMOL/L — HIGH (ref 0.5–2)
LACTATE BLDV-MCNC: 5.9 MMOL/L — CRITICAL HIGH (ref 0.5–2)
LYMPHOCYTES # BLD AUTO: 7 % — LOW (ref 20–55)
MACROCYTES BLD QL: SLIGHT — SIGNIFICANT CHANGE UP
MAGNESIUM SERPL-MCNC: 1.8 MG/DL — SIGNIFICANT CHANGE UP (ref 1.6–2.6)
MCHC RBC-ENTMCNC: 31.3 PG — HIGH (ref 27–31)
MCHC RBC-ENTMCNC: 32 G/DL — SIGNIFICANT CHANGE UP (ref 32–36)
MCV RBC AUTO: 97.9 FL — HIGH (ref 80–94)
METHOD TYPE: SIGNIFICANT CHANGE UP
MONOCYTES NFR BLD AUTO: 1 % — LOW (ref 3–10)
MRSA SPEC QL CULT: SIGNIFICANT CHANGE UP
MSSA DNA SPEC QL NAA+PROBE: SIGNIFICANT CHANGE UP
N MEN ISLT CULT: SIGNIFICANT CHANGE UP
NEUTROPHILS NFR BLD AUTO: 92 % — HIGH (ref 37–73)
OVALOCYTES BLD QL SMEAR: SLIGHT — SIGNIFICANT CHANGE UP
P AERUGINOSA DNA BLD POS NAA+NON-PROBE: SIGNIFICANT CHANGE UP
PHOSPHATE SERPL-MCNC: 1.9 MG/DL — LOW (ref 2.4–4.7)
PLAT MORPH BLD: NORMAL — SIGNIFICANT CHANGE UP
PLATELET # BLD AUTO: 263 K/UL — SIGNIFICANT CHANGE UP (ref 150–400)
POIKILOCYTOSIS BLD QL AUTO: SLIGHT — SIGNIFICANT CHANGE UP
POTASSIUM SERPL-MCNC: 3.5 MMOL/L — SIGNIFICANT CHANGE UP (ref 3.5–5.3)
POTASSIUM SERPL-SCNC: 3.5 MMOL/L — SIGNIFICANT CHANGE UP (ref 3.5–5.3)
PROCALCITONIN SERPL-MCNC: 1.3 NG/ML — HIGH (ref 0–0.04)
PROT SERPL-MCNC: 5.5 G/DL — LOW (ref 6.6–8.7)
PROTEUS SP DNA BLD POS QL NAA+NON-PROBE: SIGNIFICANT CHANGE UP
RBC # BLD: 3.29 M/UL — LOW (ref 4.6–6.2)
RBC # FLD: 13.6 % — SIGNIFICANT CHANGE UP (ref 11–15.6)
RBC BLD AUTO: ABNORMAL
S MARCESCENS DNA BLD POS NAA+NON-PROBE: SIGNIFICANT CHANGE UP
S PNEUM DNA BLD POS QL NAA+NON-PROBE: SIGNIFICANT CHANGE UP
S PYO DNA BLD POS QL NAA+NON-PROBE: SIGNIFICANT CHANGE UP
SODIUM SERPL-SCNC: 142 MMOL/L — SIGNIFICANT CHANGE UP (ref 135–145)
WBC # BLD: 4.9 K/UL — SIGNIFICANT CHANGE UP (ref 4.8–10.8)
WBC # FLD AUTO: 4.9 K/UL — SIGNIFICANT CHANGE UP (ref 4.8–10.8)

## 2018-05-29 PROCEDURE — 73552 X-RAY EXAM OF FEMUR 2/>: CPT | Mod: 26,LT

## 2018-05-29 PROCEDURE — 99233 SBSQ HOSP IP/OBS HIGH 50: CPT

## 2018-05-29 PROCEDURE — 70450 CT HEAD/BRAIN W/O DYE: CPT | Mod: 26

## 2018-05-29 PROCEDURE — 99232 SBSQ HOSP IP/OBS MODERATE 35: CPT

## 2018-05-29 PROCEDURE — 71045 X-RAY EXAM CHEST 1 VIEW: CPT | Mod: 26

## 2018-05-29 PROCEDURE — 71275 CT ANGIOGRAPHY CHEST: CPT | Mod: 26

## 2018-05-29 PROCEDURE — 74177 CT ABD & PELVIS W/CONTRAST: CPT | Mod: 26

## 2018-05-29 PROCEDURE — 93971 EXTREMITY STUDY: CPT | Mod: 26,RT

## 2018-05-29 PROCEDURE — 93010 ELECTROCARDIOGRAM REPORT: CPT

## 2018-05-29 RX ORDER — HEPARIN SODIUM 5000 [USP'U]/ML
3500 INJECTION INTRAVENOUS; SUBCUTANEOUS EVERY 6 HOURS
Qty: 0 | Refills: 0 | Status: DISCONTINUED | OUTPATIENT
Start: 2018-05-29 | End: 2018-06-01

## 2018-05-29 RX ORDER — PIPERACILLIN AND TAZOBACTAM 4; .5 G/20ML; G/20ML
4.5 INJECTION, POWDER, LYOPHILIZED, FOR SOLUTION INTRAVENOUS EVERY 6 HOURS
Qty: 0 | Refills: 0 | Status: DISCONTINUED | OUTPATIENT
Start: 2018-05-29 | End: 2018-05-29

## 2018-05-29 RX ORDER — PIPERACILLIN AND TAZOBACTAM 4; .5 G/20ML; G/20ML
3.38 INJECTION, POWDER, LYOPHILIZED, FOR SOLUTION INTRAVENOUS EVERY 8 HOURS
Qty: 0 | Refills: 0 | Status: DISCONTINUED | OUTPATIENT
Start: 2018-05-29 | End: 2018-05-31

## 2018-05-29 RX ORDER — HEPARIN SODIUM 5000 [USP'U]/ML
INJECTION INTRAVENOUS; SUBCUTANEOUS
Qty: 25000 | Refills: 0 | Status: DISCONTINUED | OUTPATIENT
Start: 2018-05-29 | End: 2018-06-01

## 2018-05-29 RX ORDER — PIPERACILLIN AND TAZOBACTAM 4; .5 G/20ML; G/20ML
4.5 INJECTION, POWDER, LYOPHILIZED, FOR SOLUTION INTRAVENOUS ONCE
Qty: 0 | Refills: 0 | Status: DISCONTINUED | OUTPATIENT
Start: 2018-05-29 | End: 2018-05-29

## 2018-05-29 RX ORDER — HEPARIN SODIUM 5000 [USP'U]/ML
7000 INJECTION INTRAVENOUS; SUBCUTANEOUS EVERY 6 HOURS
Qty: 0 | Refills: 0 | Status: DISCONTINUED | OUTPATIENT
Start: 2018-05-29 | End: 2018-06-01

## 2018-05-29 RX ORDER — HEPARIN SODIUM 5000 [USP'U]/ML
7000 INJECTION INTRAVENOUS; SUBCUTANEOUS ONCE
Qty: 0 | Refills: 0 | Status: COMPLETED | OUTPATIENT
Start: 2018-05-29 | End: 2018-05-29

## 2018-05-29 RX ORDER — SODIUM CHLORIDE 9 MG/ML
700 INJECTION, SOLUTION INTRAVENOUS ONCE
Qty: 0 | Refills: 0 | Status: COMPLETED | OUTPATIENT
Start: 2018-05-29 | End: 2018-05-29

## 2018-05-29 RX ORDER — SODIUM CHLORIDE 9 MG/ML
1000 INJECTION, SOLUTION INTRAVENOUS ONCE
Qty: 0 | Refills: 0 | Status: COMPLETED | OUTPATIENT
Start: 2018-05-29 | End: 2018-05-29

## 2018-05-29 RX ORDER — VANCOMYCIN HCL 1 G
VIAL (EA) INTRAVENOUS
Qty: 0 | Refills: 0 | Status: DISCONTINUED | OUTPATIENT
Start: 2018-05-29 | End: 2018-05-29

## 2018-05-29 RX ORDER — VANCOMYCIN HCL 1 G
1000 VIAL (EA) INTRAVENOUS ONCE
Qty: 0 | Refills: 0 | Status: DISCONTINUED | OUTPATIENT
Start: 2018-05-29 | End: 2018-05-29

## 2018-05-29 RX ORDER — PIPERACILLIN AND TAZOBACTAM 4; .5 G/20ML; G/20ML
3.38 INJECTION, POWDER, LYOPHILIZED, FOR SOLUTION INTRAVENOUS EVERY 8 HOURS
Qty: 0 | Refills: 0 | Status: DISCONTINUED | OUTPATIENT
Start: 2018-05-29 | End: 2018-05-29

## 2018-05-29 RX ORDER — VANCOMYCIN HCL 1 G
125 VIAL (EA) INTRAVENOUS EVERY 6 HOURS
Qty: 0 | Refills: 0 | Status: DISCONTINUED | OUTPATIENT
Start: 2018-05-29 | End: 2018-05-29

## 2018-05-29 RX ORDER — VANCOMYCIN HCL 1 G
1000 VIAL (EA) INTRAVENOUS EVERY 12 HOURS
Qty: 0 | Refills: 0 | Status: DISCONTINUED | OUTPATIENT
Start: 2018-05-29 | End: 2018-05-29

## 2018-05-29 RX ADMIN — Medication 100 MILLIGRAM(S): at 22:05

## 2018-05-29 RX ADMIN — PANTOPRAZOLE SODIUM 40 MILLIGRAM(S): 20 TABLET, DELAYED RELEASE ORAL at 05:22

## 2018-05-29 RX ADMIN — SODIUM CHLORIDE 100 MILLILITER(S): 9 INJECTION, SOLUTION INTRAVENOUS at 09:42

## 2018-05-29 RX ADMIN — Medication 650 MILLIGRAM(S): at 23:21

## 2018-05-29 RX ADMIN — HEPARIN SODIUM 5000 UNIT(S): 5000 INJECTION INTRAVENOUS; SUBCUTANEOUS at 05:22

## 2018-05-29 RX ADMIN — SODIUM CHLORIDE 1400 MILLILITER(S): 9 INJECTION, SOLUTION INTRAVENOUS at 09:42

## 2018-05-29 RX ADMIN — Medication 650 MILLIGRAM(S): at 05:22

## 2018-05-29 RX ADMIN — HEPARIN SODIUM 1600 UNIT(S)/HR: 5000 INJECTION INTRAVENOUS; SUBCUTANEOUS at 18:02

## 2018-05-29 RX ADMIN — SODIUM CHLORIDE 100 MILLILITER(S): 9 INJECTION, SOLUTION INTRAVENOUS at 05:20

## 2018-05-29 RX ADMIN — GABAPENTIN 300 MILLIGRAM(S): 400 CAPSULE ORAL at 05:22

## 2018-05-29 RX ADMIN — Medication 650 MILLIGRAM(S): at 22:06

## 2018-05-29 RX ADMIN — Medication 5 MILLIGRAM(S): at 22:06

## 2018-05-29 RX ADMIN — Medication 650 MILLIGRAM(S): at 06:22

## 2018-05-29 RX ADMIN — HEPARIN SODIUM 7000 UNIT(S): 5000 INJECTION INTRAVENOUS; SUBCUTANEOUS at 18:01

## 2018-05-29 RX ADMIN — GABAPENTIN 300 MILLIGRAM(S): 400 CAPSULE ORAL at 22:06

## 2018-05-29 RX ADMIN — ATORVASTATIN CALCIUM 10 MILLIGRAM(S): 80 TABLET, FILM COATED ORAL at 22:06

## 2018-05-29 RX ADMIN — PIPERACILLIN AND TAZOBACTAM 25 GRAM(S): 4; .5 INJECTION, POWDER, LYOPHILIZED, FOR SOLUTION INTRAVENOUS at 20:57

## 2018-05-29 RX ADMIN — DONEPEZIL HYDROCHLORIDE 10 MILLIGRAM(S): 10 TABLET, FILM COATED ORAL at 22:06

## 2018-05-29 RX ADMIN — LISINOPRIL 5 MILLIGRAM(S): 2.5 TABLET ORAL at 05:22

## 2018-05-29 RX ADMIN — SODIUM CHLORIDE 1000 MILLILITER(S): 9 INJECTION, SOLUTION INTRAVENOUS at 09:41

## 2018-05-29 RX ADMIN — SENNA PLUS 2 TABLET(S): 8.6 TABLET ORAL at 22:06

## 2018-05-29 RX ADMIN — Medication 100 MILLIGRAM(S): at 05:23

## 2018-05-29 RX ADMIN — PIPERACILLIN AND TAZOBACTAM 25 GRAM(S): 4; .5 INJECTION, POWDER, LYOPHILIZED, FOR SOLUTION INTRAVENOUS at 11:31

## 2018-05-29 RX ADMIN — TAMSULOSIN HYDROCHLORIDE 0.4 MILLIGRAM(S): 0.4 CAPSULE ORAL at 22:07

## 2018-05-29 NOTE — CHART NOTE - NSCHARTNOTEFT_GEN_A_CORE
Rapid Response/code sepsis PGY 3 Note    HPI:  88 year old male w/PMH of Alzheimers, prostate ca s/p prostatectomy transferred here from [place] with pulselessness, pain and numbness to [facility] that developed around 9pm . Patients family reports a recent diagnosis of DVT to [facility] and started Lovenox / coumadin therapy about 10 days ago with a recent INR of 2.3 a few days ago, however INR - 4.2 on repeat labs here . Lactate uptrending from 2.6 to 4.7 . Pt comes in on cardene gtt for SBP > 200 .     Pts family report no history of vascular intervention .   Pt complains of pain despite medication, loss of sensation and ability to move leg.  Pt normally ambulates independently at home. (20 May 2018 06:48)       code sepsis called because of desaturation, rectal fever, hypoxia, dyspnea, confusion, hypotension, tachypnea, tachycardia  Vital signs at code incluse a temp of    , Hr of    , BP    , RR   and O2 saturation of  %  WBC is    mental status is altered at baseline  Pt has source of infection ( )    Allergies    No Known Allergies    Intolerances      PAST MEDICAL & SURGICAL HISTORY:  DVT (deep venous thrombosis)  Cataract of both eyes  Prostate CA  AAA (abdominal aortic aneurysm)  Alzheimer disease  Hypertension  DVT (deep venous thrombosis)  AAA (abdominal aortic aneurysm): 5cm  Right iliac aneurysm 4cm  Alzheimer disease  Cataract  Prostate ca  Gallstones  History of cataract surgery  H/O prostatectomy  History of cholecystectomy  H/O prostatectomy: 1998  Status post cataract surgery: bilateral  History of cholecystectomy: 1997    Vital Signs Last 24 Hrs  T(C): 37.1 (28 May 2018 23:43), Max: 37.1 (28 May 2018 23:43)  T(F): 98.8 (28 May 2018 23:43), Max: 98.8 (28 May 2018 23:43)  HR: 89 (29 May 2018 05:20) (73 - 89)  BP: 138/75 (29 May 2018 05:20) (138/75 - 170/73)  BP(mean): --  RR: 17 (29 May 2018 05:20) (17 - 20)  SpO2: 98% (29 May 2018 05:20) (98% - 99%)    PHYSICAL EXAM  GENERAL: The patient is awake and alert in no apparent distress.   HEENT: Head is normocephalic and atraumatic. Extraocular muscles are intact. Mucous membranes are moist. No throat erythema/exudates no lymphadenopathy, no JVD,   NECK: Supple.  LUNGS: Clear to auscultation BL without wheezing, rales or rhonchi; respirations unlabored  HEART: Regular rate and rhythm ,+S1/+S2, no murmurs, rubs, gallops  ABDOMEN: Soft, nontender, and nondistended, no rebound, guarding rigidity, bowel sounds in all 4 quadrants  EXTREMITIES: Without any cyanosis, clubbing, rash, lesions or edema.  SKIN: No new rashes or lesions.  MSK: strength equal BL  VASCULAR: Radial and Dorsal pedal pulses palpable BL  NEUROLOGIC: Grossly intact.  PSYCH: No new changes.                       Assessment- Rapid Response called for 88y year old Male admitted for     Plan-  stat lactate and cultures (blood x 2, urine)      ml bolus ivfs at 30cc/kg will adjust pending lactate results  NOT given fluids due to-     ekg   xray   cbc, cmp,   Tylenol 1G IV 650po stat  ABX- amp 2g stat and q8hr, gent. 80mg q8hr  ABX- vancomycin 1g, zosyn 3.375g q8hr  ABX- Rocephin 1g daily  will reasses pt in 3 and 6 hours pending results of lactate  case discussed with PCP who agrees with plan

## 2018-05-29 NOTE — CHART NOTE - NSCHARTNOTEFT_GEN_A_CORE
Patient with good renal function:  Creatinine, Serum: 0.84 mg/dL (05.29.18 @ 09:55)  Blood Urea Nitrogen, Serum: 11.0 mg/dL (05.29.18 @ 09:55)    CT Abdomen/Pelvis with IV contrast and CTA of Chest will be performed due to medical necessity.

## 2018-05-29 NOTE — CHART NOTE - NSCHARTNOTEFT_GEN_A_CORE
Source: EMR- Pt asleep, unarousable     Current Diet: Regular    PO intake:  Per EMR, Pt with suboptimal PO intake, 0-50% PO- requires feeding assistance. Pt lethargic today     Current Weight:   (5/23) 89kg  (5/22) 87kg  (5/21) 88kg    % Weight Change     Pertinent Medications: MEDICATIONS  (STANDING):  acetaminophen   Tablet. 650 milliGRAM(s) Oral every 6 hours  aspirin  chewable 81 milliGRAM(s) Oral daily  atorvastatin 10 milliGRAM(s) Oral at bedtime  docusate sodium 100 milliGRAM(s) Oral three times a day  donepezil 10 milliGRAM(s) Oral at bedtime  gabapentin 300 milliGRAM(s) Oral three times a day  heparin  Injectable 5000 Unit(s) SubCutaneous every 8 hours  lisinopril 5 milliGRAM(s) Oral daily  melatonin 5 milliGRAM(s) Oral at bedtime  multiple electrolytes Injection Type 1 1000 milliLiter(s) (100 mL/Hr) IV Continuous <Continuous>  pantoprazole    Tablet 40 milliGRAM(s) Oral before breakfast  piperacillin/tazobactam IVPB. 3.375 Gram(s) IV Intermittent every 8 hours  senna 2 Tablet(s) Oral at bedtime  tamsulosin 0.4 milliGRAM(s) Oral at bedtime  vancomycin    Solution 125 milliGRAM(s) Oral every 6 hours    MEDICATIONS  (PRN):  bisacodyl Suppository 10 milliGRAM(s) Rectal daily PRN Constipation    Pertinent Labs: CBC Full  -  ( 29 May 2018 09:57 )  WBC Count : 4.9 K/uL  Hemoglobin : 10.3 g/dL  Hematocrit : 32.2 %  Platelet Count - Automated : 263 K/uL  Mean Cell Volume : 97.9 fl  Mean Cell Hemoglobin : 31.3 pg  Mean Cell Hemoglobin Concentration : 32.0 g/dL  Auto Neutrophil # : x  Auto Lymphocyte # : x  Auto Monocyte # : x  Auto Eosinophil # : x  Auto Basophil # : x  Auto Neutrophil % : 92.0 %  Auto Lymphocyte % : 7.0 %  Auto Monocyte % : 1.0 %  Auto Eosinophil % : x  Auto Basophil % : x      Skin: s/p AKA     Nutrition focused physical exam conducted - found signs of malnutrition [x ]absent [ ]present      Estimated Needs:   [x ] no change since previous assessment  [ ] recalculated:     Current Nutrition Diagnosis: Increased nutrient needs related to increased demand to promote healing as evidenced by s/p AKA     Recommendations: Consider DASH diet, Rx: Vitamin C (500mg)/daily     Monitoring and Evaluation:   [ x] PO intake [ x] Tolerance to diet prescription [X] Weights  [X] Follow up per protocol [X] Labs: Source: EMR- Pt asleep, unarousable     Current Diet: Regular    PO intake:  Per EMR, Pt with suboptimal PO intake, 0-50% PO- requires feeding assistance. Pt lethargic today     Current Weight:   (5/23) 89kg  (5/22) 87kg  (5/21) 88kg    % Weight Change     Pertinent Medications: MEDICATIONS  (STANDING):  acetaminophen   Tablet. 650 milliGRAM(s) Oral every 6 hours  aspirin  chewable 81 milliGRAM(s) Oral daily  atorvastatin 10 milliGRAM(s) Oral at bedtime  docusate sodium 100 milliGRAM(s) Oral three times a day  donepezil 10 milliGRAM(s) Oral at bedtime  gabapentin 300 milliGRAM(s) Oral three times a day  heparin  Injectable 5000 Unit(s) SubCutaneous every 8 hours  lisinopril 5 milliGRAM(s) Oral daily  melatonin 5 milliGRAM(s) Oral at bedtime  multiple electrolytes Injection Type 1 1000 milliLiter(s) (100 mL/Hr) IV Continuous <Continuous>  pantoprazole    Tablet 40 milliGRAM(s) Oral before breakfast  piperacillin/tazobactam IVPB. 3.375 Gram(s) IV Intermittent every 8 hours  senna 2 Tablet(s) Oral at bedtime  tamsulosin 0.4 milliGRAM(s) Oral at bedtime  vancomycin    Solution 125 milliGRAM(s) Oral every 6 hours    MEDICATIONS  (PRN):  bisacodyl Suppository 10 milliGRAM(s) Rectal daily PRN Constipation    Pertinent Labs: CBC Full  -  ( 29 May 2018 09:57 )  WBC Count : 4.9 K/uL  Hemoglobin : 10.3 g/dL  Hematocrit : 32.2 %  Platelet Count - Automated : 263 K/uL  Mean Cell Volume : 97.9 fl  Mean Cell Hemoglobin : 31.3 pg  Mean Cell Hemoglobin Concentration : 32.0 g/dL  Auto Neutrophil # : x  Auto Lymphocyte # : x  Auto Monocyte # : x  Auto Eosinophil # : x  Auto Basophil # : x  Auto Neutrophil % : 92.0 %  Auto Lymphocyte % : 7.0 %  Auto Monocyte % : 1.0 %  Auto Eosinophil % : x  Auto Basophil % : x      Skin: s/p AKA     Nutrition focused physical exam conducted - found signs of malnutrition [x ]absent [ ]present      Estimated Needs:   [x ] no change since previous assessment  [ ] recalculated:     Current Nutrition Diagnosis: Increased nutrient needs related to increased demand to promote healing as evidenced by s/p AKA     Recommendations: Consider DASH diet + Ensure Enlive TID, Rx: Vitamin C (500mg)/daily     Monitoring and Evaluation:   [ x] PO intake [ x] Tolerance to diet prescription [X] Weights  [X] Follow up per protocol [X] Labs:

## 2018-05-29 NOTE — PROGRESS NOTE ADULT - SUBJECTIVE AND OBJECTIVE BOX
Events noted   afebrile Tmasx 104?  absd soft nontender  amp site clean  Etiology of event uti vsc dif  supportive care  cont anticoag bridge to coumadin  supportive care

## 2018-05-29 NOTE — CONSULT NOTE ADULT - ATTENDING COMMENTS
COUNSELING:    Face to face meeting to discuss Advanced Care Planning - Time Spent ______ Minutes.  See goals of care note.    ** Called daughter Ayala Koenig-pre-op notified her patient on the  way to OR  He was calm; confused-no idea where he was going, or remembered    More than 50% time spent in counseling and coordinating care. ___25___ Minutes.                                               	  Thank you for the opportunity to assist with the care of this patient.   [facility] [phone].   Met with wife and daughter-in-law post-operatively discussed post-op nerve pain; their thoughts on PT and Rehab before returning home  DNR re-established
Will Follow

## 2018-05-29 NOTE — CHART NOTE - NSCHARTNOTEFT_GEN_A_CORE
Rapid Response/code sepsis PGY 3 Note    HPI:  88 year old male w/PMH of Alzheimers, prostate ca s/p prostatectomy transferred here from [place] with pulselessness, pain and numbness to [facility] that developed around 9pm . Patients family reports a recent diagnosis of DVT to [facility] and started Lovenox / coumadin therapy about 10 days ago with a recent INR of 2.3 a few days ago, however INR - 4.2 on repeat labs here . Lactate uptrending from 2.6 to 4.7 . Pt comes in on cardene gtt for SBP > 200 .     Pts family report no history of vascular intervention .   Pt complains of pain despite medication, loss of sensation and ability to move leg.  Pt normally ambulates independently at home. (20 May 2018 06:48)      Code sepsis called because of desaturation, rectal fever, hypoxia, dyspnea, confusion, hypotension, tachypnea, tachycardia  Vital signs at code include a rectal temp of  102  ,  of    , BP    , RR   and O2 saturation of 80 % on nonrebreather.  No recent labs.  Patient is at baseline  Pt has source of infection, he developed water diarrhea this morning.    Allergies  No Known Allergies    PAST MEDICAL & SURGICAL HISTORY:  DVT (deep venous thrombosis)  Cataract of both eyes  Prostate CA  AAA (abdominal aortic aneurysm)  Alzheimer disease  Hypertension  DVT (deep venous thrombosis)  AAA (abdominal aortic aneurysm): 5cm  Right iliac aneurysm 4cm  Alzheimer disease  Cataract  Prostate ca  Gallstones  History of cataract surgery  H/O prostatectomy  History of cholecystectomy  H/O prostatectomy: 1998  Status post cataract surgery: bilateral  History of cholecystectomy: 1997    Vital Signs Last 24 Hrs  T(C): 37.1 (28 May 2018 23:43), Max: 37.1 (28 May 2018 23:43)  T(F): 98.8 (28 May 2018 23:43), Max: 98.8 (28 May 2018 23:43)  HR: 89 (29 May 2018 05:20) (73 - 89)  BP: 138/75 (29 May 2018 05:20) (138/75 - 170/73)  BP(mean): --  RR: 17 (29 May 2018 05:20) (17 - 20)  SpO2: 98% (29 May 2018 05:20) (98% - 99%)    PHYSICAL EXAM  GENERAL: The patient is awake and alert, tachypneic  HEENT: Head is normocephalic and atraumatic. Mucous membranes are moist.  NECK: Supple.  LUNGS: Clear to auscultation BL without wheezing, rales or rhonchi; respirations unlabored  HEART: Tachycardic, irregular rhythm ,+S1/+S2, no murmurs, rubs, gallops  ABDOMEN: Soft, nontender, and nondistended, no rebound, guarding rigidity, bowel sounds in all 4 quadrants  EXTREMITIES: Left AKA stump with edema. No periwound erythema, discharge, drainage is noted. Right 3+ popliteal, 2+ dorsalis pedis pulse. Capillary refill is less than 2 seconds. No cyanosis.  SKIN: No new rashes or lesions.  MSK: strength equal BL  NEUROLOGIC: Grossly intact.  PSYCH: No new changes.     Assessment- Rapid Response called for 88y year old Male admitted for AKA due to non salvageable arterial insufficiency for rectal temperature, hypoxia and tachycardia.  Patient known to have paroxysmal AFib. Source of infection likely C. diff, will treat empirically.  Will cover for sepsis with Zosyn.    Plan-  Stat lactate and cultures (blood x 2, urine), Procalcitonin  2000 ml bolus IV, will adjust pending lactate results  EKG   Chest xray  CBC, BMP, Mag and Phosphorus,   Tylenol 1G IV 650po stat  ABX- Zosyn 4.5g now, then q 6 hours.  Vancomycin 125mg PO q 6 hours.  Will reasses pt in 3 and 6 hours pending results of lactate  Case presented to primary team Dr. Fry by REHAN Benjamin, he requests CTA to rule out PE. Rapid Response/code sepsis PGY 3 Note    HPI:  88 year old male w/PMH of Alzheimers, prostate ca s/p prostatectomy transferred here from [place] with pulselessness, pain and numbness to [facility] that developed around 9pm . Patients family reports a recent diagnosis of DVT to [facility] and started Lovenox / coumadin therapy about 10 days ago with a recent INR of 2.3 a few days ago, however INR - 4.2 on repeat labs here . Lactate uptrending from 2.6 to 4.7 . Pt comes in on cardene gtt for SBP > 200 .     Pts family report no history of vascular intervention .   Pt complains of pain despite medication, loss of sensation and ability to move leg.  Pt normally ambulates independently at home. (20 May 2018 06:48)      Code sepsis called because of desaturation, rectal fever, hypoxia, dyspnea, confusion, hypotension, tachypnea, tachycardia  Vital signs at code include a rectal temp of  102  ,  of    , BP    , RR   and O2 saturation of 80 % on nonrebreather.  No recent labs.  Patient is at baseline  Pt has source of infection, he developed water diarrhea this morning.    Allergies  No Known Allergies    PAST MEDICAL & SURGICAL HISTORY:  DVT (deep venous thrombosis)  Cataract of both eyes  Prostate CA  AAA (abdominal aortic aneurysm)  Alzheimer disease  Hypertension  DVT (deep venous thrombosis)  AAA (abdominal aortic aneurysm): 5cm  Right iliac aneurysm 4cm  Alzheimer disease  Cataract  Prostate ca  Gallstones  History of cataract surgery  H/O prostatectomy  History of cholecystectomy  H/O prostatectomy: 1998  Status post cataract surgery: bilateral  History of cholecystectomy: 1997    Vital Signs Last 24 Hrs  T(C): 37.1 (28 May 2018 23:43), Max: 37.1 (28 May 2018 23:43)  T(F): 98.8 (28 May 2018 23:43), Max: 98.8 (28 May 2018 23:43)  HR: 89 (29 May 2018 05:20) (73 - 89)  BP: 138/75 (29 May 2018 05:20) (138/75 - 170/73)  BP(mean): --  RR: 17 (29 May 2018 05:20) (17 - 20)  SpO2: 98% (29 May 2018 05:20) (98% - 99%)    PHYSICAL EXAM  GENERAL: The patient is awake and alert, tachypneic  HEENT: Head is normocephalic and atraumatic. Mucous membranes are moist.  NECK: Supple.  LUNGS: Clear to auscultation BL without wheezing, rales or rhonchi; respirations unlabored  HEART: Tachycardic, irregular rhythm ,+S1/+S2, no murmurs, rubs, gallops  ABDOMEN: Soft, nontender, and nondistended, no rebound, guarding rigidity, bowel sounds in all 4 quadrants  EXTREMITIES: Left AKA stump with edema. No periwound erythema, discharge, drainage is noted. Right 3+ popliteal, 2+ dorsalis pedis pulse. Capillary refill is less than 2 seconds. No cyanosis.  SKIN: No new rashes or lesions.  MSK: strength equal BL  NEUROLOGIC: Grossly intact.  PSYCH: No new changes.     Assessment- Rapid Response called for 88y year old Male admitted for AKA due to non salvageable arterial insufficiency for rectal temperature, hypoxia and tachycardia.  Patient known to have paroxysmal AFib. Source of infection likely C. diff, will treat empirically.  Will cover for sepsis with Zosyn. O2 Saturation after evaluation was 96% on nonrebreather.    Plan-  Stat lactate and cultures (blood x 2, urine), Procalcitonin  2000 ml bolus IV, will adjust pending lactate results  EKG   Chest xray  CBC, BMP, Mag and Phosphorus,   Tylenol 1G IV 650po stat  ABX- Zosyn 4.5g now, then q 6 hours.  Vancomycin 125mg PO q 6 hours.  Will reasses pt in 3 and 6 hours pending results of lactate  Case presented to primary team Dr. Fry by REHAN Benjamin. O2 Saturation will be monitored and CTA will be deferred for now. Rapid Response/code sepsis PGY 3 Note    HPI:  88 year old male w/PMH of Alzheimers, prostate ca s/p prostatectomy transferred here from [place] with pulselessness, pain and numbness to [facility] that developed around 9pm . Patients family reports a recent diagnosis of DVT to [facility] and started Lovenox / coumadin therapy about 10 days ago with a recent INR of 2.3 a few days ago, however INR - 4.2 on repeat labs here . Lactate uptrending from 2.6 to 4.7 . Pt comes in on cardene gtt for SBP > 200 .     Pts family report no history of vascular intervention .   Pt complains of pain despite medication, loss of sensation and ability to move leg.  Pt normally ambulates independently at home. (20 May 2018 06:48)    Code sepsis called because of desaturation, rectal fever, hypoxia, dyspnea, confusion, hypotension, tachypnea, tachycardia  Vital signs at code include a rectal temp of  102  ,  of    , /95   , RR 30  and O2 saturation of 80 % on nonrebreather.  No recent labs.  Patient is at baseline  Pt has source of infection, he developed water diarrhea this morning.    Allergies  No Known Allergies    PAST MEDICAL & SURGICAL HISTORY:  DVT (deep venous thrombosis)  Cataract of both eyes  Prostate CA  AAA (abdominal aortic aneurysm)  Alzheimer disease  Hypertension  DVT (deep venous thrombosis)  AAA (abdominal aortic aneurysm): 5cm  Right iliac aneurysm 4cm  Alzheimer disease  Cataract  Prostate ca  Gallstones  History of cataract surgery  H/O prostatectomy  History of cholecystectomy  H/O prostatectomy: 1998  Status post cataract surgery: bilateral  History of cholecystectomy: 1997    Vital Signs Last 24 Hrs  T(C): 37.1 (28 May 2018 23:43), Max: 37.1 (28 May 2018 23:43)  T(F): 98.8 (28 May 2018 23:43), Max: 98.8 (28 May 2018 23:43)  HR: 89 (29 May 2018 05:20) (73 - 89)  BP: 138/75 (29 May 2018 05:20) (138/75 - 170/73)  BP(mean): --  RR: 17 (29 May 2018 05:20) (17 - 20)  SpO2: 98% (29 May 2018 05:20) (98% - 99%)    PHYSICAL EXAM  GENERAL: The patient is awake and alert, tachypneic  HEENT: Head is normocephalic and atraumatic. Mucous membranes are moist.  NECK: Supple.  LUNGS: Clear to auscultation BL without wheezing, rales or rhonchi; respirations unlabored  HEART: Tachycardic, irregular rhythm ,+S1/+S2, no murmurs, rubs, gallops  ABDOMEN: Soft, nontender, and nondistended, no rebound, guarding rigidity, bowel sounds in all 4 quadrants  EXTREMITIES: Left AKA stump with edema. No periwound erythema, discharge, drainage is noted. Right 3+ popliteal, 2+ dorsalis pedis pulse. Capillary refill is less than 2 seconds. No cyanosis.  SKIN: No new rashes or lesions.  MSK: strength equal BL  NEUROLOGIC: Grossly intact.  PSYCH: No new changes.    Portable X-ray: Shows new opacity in the right lower lobe compared to previous Chest X-Ray on 05/20/2018.  Lactate reported: 5.9     Assessment- Rapid Response called for 88y year old Male admitted for AKA due to non salvageable arterial insufficiency for rectal temperature, hypoxia and tachycardia.  Patient known to have paroxysmal AFib. Source of infection likely C. diff, will treat empirically with PO vancomycin. In addition, new opacities noted on lower right lobe on portable chest X-ray. Will cover for sepsis with Zosyn. O2 Saturation after evaluation was 96% on nonrebreather.    Plan-  Stat lactate and cultures (blood x 2, urine), Procalcitonin  2000 ml bolus IV, will adjust pending lactate results  EKG   Chest xray  CBC, BMP, Mag and Phosphorus,   ABX- Zosyn 4.5g now, then q 6 hours.  Vancomycin 125mg PO q 6 hours.  Will reasses pt in 3 and 6 hours pending results of lactate  ID consultation with Dr. Dayday Tate.  Case presented to primary team Dr. Fry by REHAN Benjamin. O2 Saturation will be monitored and CTA will be deferred for now. Rapid Response/code sepsis PGY 3 Note    HPI:  88 year old male w/PMH of Alzheimers, prostate ca s/p prostatectomy transferred here from [place] with pulselessness, pain and numbness to [facility] that developed around 9pm . Patients family reports a recent diagnosis of DVT to [facility] and started Lovenox / coumadin therapy about 10 days ago with a recent INR of 2.3 a few days ago, however INR - 4.2 on repeat labs here . Lactate uptrending from 2.6 to 4.7 . Pt comes in on cardene gtt for SBP > 200 .     Pts family report no history of vascular intervention .   Pt complains of pain despite medication, loss of sensation and ability to move leg.  Pt normally ambulates independently at home. (20 May 2018 06:48)    Code sepsis called because of desaturation, rectal fever, hypoxia, dyspnea, confusion, hypotension, tachypnea, tachycardia  Vital signs at code include a rectal temp of  102  ,  of    , /95   , RR 30  and O2 saturation of 80 % on nonrebreather.  No recent labs.  Patient is at baseline  Pt has source of infection, he developed water diarrhea this morning.    Allergies  No Known Allergies    PAST MEDICAL & SURGICAL HISTORY:  DVT (deep venous thrombosis)  Cataract of both eyes  Prostate CA  AAA (abdominal aortic aneurysm)  Alzheimer disease  Hypertension  DVT (deep venous thrombosis)  AAA (abdominal aortic aneurysm): 5cm  Right iliac aneurysm 4cm  Alzheimer disease  Cataract  Prostate ca  Gallstones  History of cataract surgery  H/O prostatectomy  History of cholecystectomy  H/O prostatectomy: 1998  Status post cataract surgery: bilateral  History of cholecystectomy: 1997    Vital Signs Last 24 Hrs  T(C): 37.1 (28 May 2018 23:43), Max: 37.1 (28 May 2018 23:43)  T(F): 98.8 (28 May 2018 23:43), Max: 98.8 (28 May 2018 23:43)  HR: 89 (29 May 2018 05:20) (73 - 89)  BP: 138/75 (29 May 2018 05:20) (138/75 - 170/73)  BP(mean): --  RR: 17 (29 May 2018 05:20) (17 - 20)  SpO2: 98% (29 May 2018 05:20) (98% - 99%)    PHYSICAL EXAM  GENERAL: The patient is awake and alert, tachypneic  HEENT: Head is normocephalic and atraumatic. Mucous membranes are moist.  NECK: Supple.  LUNGS: Clear to auscultation BL without wheezing, rales or rhonchi; respirations unlabored  HEART: Tachycardic, irregular rhythm ,+S1/+S2, no murmurs, rubs, gallops  ABDOMEN: Soft, nontender, and nondistended, no rebound, guarding rigidity, bowel sounds in all 4 quadrants  EXTREMITIES: Left AKA stump with edema. No periwound erythema, discharge, drainage is noted. Right 3+ popliteal, 2+ dorsalis pedis pulse. Capillary refill is less than 2 seconds. No cyanosis.  SKIN: No new rashes or lesions.  MSK: strength equal BL  NEUROLOGIC: Grossly intact.  PSYCH: No new changes.    Portable X-ray: Shows new opacity in the right lower lobe compared to previous Chest X-Ray on 05/20/2018.  Lactate reported: 5.9     Assessment- Rapid Response called for 88y year old Male admitted for AKA due to non salvageable arterial insufficiency for rectal temperature, hypoxia and tachycardia.  Patient known to have paroxysmal AFib. Source of infection likely C. diff, will treat empirically with PO vancomycin. In addition, new opacities noted on lower right lobe on portable chest X-ray. Will cover for sepsis with Zosyn. O2 Saturation after evaluation was 96% on nonrebreather.    Plan-  Stat lactate and cultures (blood x 2, urine), Procalcitonin  2000 ml bolus IV, will adjust pending lactate results  EKG   Chest x-ray stat  CBC, CMP, Mag and Phosphorus,   ABX- Zosyn 4.5g now, then q 6 hours.  Vancomycin 125mg PO q 6 hours treating for cdiff  Will reassess pt in 1 and 3 hours pending results of lactate  ID consultation with Dr. Dayday Tate.  Case presented to primary team Dr. Fry by REHAN Benjamin. O2 Saturation will be monitored and CTA will be deferred for now due to o2  saturation being 96 on room temp    Addendum-  Lactate to be repeated stat s/p fluids  transfer to monitored bed   [facility] PGY 3 Rapid Response/code sepsis PGY 3 Note    HPI:  88 year old male w/PMH of Alzheimers, prostate ca s/p prostatectomy transferred here from [place] with pulselessness, pain and numbness to [facility] that developed around 9pm . Patients family reports a recent diagnosis of DVT to [facility] and started Lovenox / coumadin therapy about 10 days ago with a recent INR of 2.3 a few days ago, however INR - 4.2 on repeat labs here . Lactate uptrending from 2.6 to 4.7 . Pt comes in on cardene gtt for SBP > 200 .     Pts family report no history of vascular intervention .   Pt complains of pain despite medication, loss of sensation and ability to move leg.  Pt normally ambulates independently at home. (20 May 2018 06:48)    Code sepsis called because of desaturation, rectal fever, hypoxia, dyspnea, confusion, hypotension, tachypnea, tachycardia  Vital signs at code include a rectal temp of  102  ,  of    , /95   , RR 30  and O2 saturation of 80 % on nonrebreather.  No recent labs.  Patient is at baseline  Pt has source of infection, he developed water diarrhea this morning.    Allergies  No Known Allergies    PAST MEDICAL & SURGICAL HISTORY:  DVT (deep venous thrombosis)  Cataract of both eyes  Prostate CA  AAA (abdominal aortic aneurysm)  Alzheimer disease  Hypertension  DVT (deep venous thrombosis)  AAA (abdominal aortic aneurysm): 5cm  Right iliac aneurysm 4cm  Alzheimer disease  Cataract  Prostate ca  Gallstones  History of cataract surgery  H/O prostatectomy  History of cholecystectomy  H/O prostatectomy: 1998  Status post cataract surgery: bilateral  History of cholecystectomy: 1997    Vital Signs Last 24 Hrs  T(C): 37.1 (28 May 2018 23:43), Max: 37.1 (28 May 2018 23:43)  T(F): 98.8 (28 May 2018 23:43), Max: 98.8 (28 May 2018 23:43)  HR: 89 (29 May 2018 05:20) (73 - 89)  BP: 138/75 (29 May 2018 05:20) (138/75 - 170/73)  BP(mean): --  RR: 17 (29 May 2018 05:20) (17 - 20)  SpO2: 98% (29 May 2018 05:20) (98% - 99%)    PHYSICAL EXAM  GENERAL: The patient is awake and alert, tachypneic  HEENT: Head is normocephalic and atraumatic. Mucous membranes are moist.  NECK: Supple.  LUNGS: Clear to auscultation BL without wheezing, rales or rhonchi; respirations unlabored  HEART: Tachycardic, irregular rhythm ,+S1/+S2, no murmurs, rubs, gallops  ABDOMEN: Soft, nontender, and nondistended, no rebound, guarding rigidity, bowel sounds in all 4 quadrants  EXTREMITIES: Left AKA stump with edema. No periwound erythema, discharge, drainage is noted. Right 3+ popliteal, 2+ dorsalis pedis pulse. Capillary refill is less than 2 seconds. No cyanosis.  SKIN: No new rashes or lesions.  MSK: strength equal BL  NEUROLOGIC: Grossly intact.  PSYCH: No new changes.    Portable X-ray: Shows new opacity in the right lower lobe compared to previous Chest X-Ray on 05/20/2018.  Lactate reported: 5.9     Assessment- Rapid Response called for 88y year old Male admitted for AKA due to non salvageable arterial insufficiency for rectal temperature, hypoxia and tachycardia.  Patient known to have paroxysmal AFib. Source of infection likely C. diff, will treat empirically with PO vancomycin. In addition, new opacities noted on lower right lobe on portable chest X-ray. Will cover for sepsis with Zosyn. O2 Saturation after evaluation was 96% on nonrebreather.    Plan-  Stat lactate and cultures (blood x 2, urine), Procalcitonin  2000 ml bolus IV, will adjust pending lactate results  EKG   Chest x-ray stat  CBC, CMP, Mag and Phosphorus,   ABX- Zosyn 4.5g now, then q 6 hours.  Vancomycin 125mg PO q 6 hours treating for cdiff  Will reassess pt in 1 and 3 hours pending results of lactate  ID consultation with Dr. Dayday Tate.  Case presented to primary team Dr. Fry by REHAN Benjamin. O2 Saturation will be monitored and CTA will be deferred for now due to o2  saturation being 96 on room temp    9:32 am Addendum-  Lactate to be repeated stat s/p fluids  transfer to monitored bed   [facility] PGY 3        10:32 am Addendum-  Patient reports feeling better  Repeat lactate is 2.5  Will repeat lactate  will continue with fluids  Infectious Disease consult pending  Will discontinue zosyn if c.diff is confirmed positive  Vascular Surgery updated and will follow patient.  [facility] PGY 3 Rapid Response/code sepsis PGY 3 Note    HPI:  88 year old male w/PMH of Alzheimers, prostate ca s/p prostatectomy transferred here from [place] with pulselessness, pain and numbness to [facility] that developed around 9pm . Patients family reports a recent diagnosis of DVT to [facility] and started Lovenox / coumadin therapy about 10 days ago with a recent INR of 2.3 a few days ago, however INR - 4.2 on repeat labs here . Lactate uptrending from 2.6 to 4.7 . Pt comes in on cardene gtt for SBP > 200 .     Pts family report no history of vascular intervention .   Pt complains of pain despite medication, loss of sensation and ability to move leg.  Pt normally ambulates independently at home. (20 May 2018 06:48)    Code sepsis called because of desaturation, rectal fever, hypoxia, dyspnea, confusion, hypotension, tachypnea, tachycardia  Vital signs at code include a rectal temp of  102  ,  of    , /95   , RR 30  and O2 saturation of 80 % on nonrebreather.  No recent labs.  Patient is at baseline  Pt has source of infection, he developed water diarrhea this morning.    Allergies  No Known Allergies    PAST MEDICAL & SURGICAL HISTORY:  DVT (deep venous thrombosis)  Cataract of both eyes  Prostate CA  AAA (abdominal aortic aneurysm)  Alzheimer disease  Hypertension  DVT (deep venous thrombosis)  AAA (abdominal aortic aneurysm): 5cm  Right iliac aneurysm 4cm  Alzheimer disease  Cataract  Prostate ca  Gallstones  History of cataract surgery  H/O prostatectomy  History of cholecystectomy  H/O prostatectomy: 1998  Status post cataract surgery: bilateral  History of cholecystectomy: 1997    Vital Signs Last 24 Hrs  T(C): 37.1 (28 May 2018 23:43), Max: 37.1 (28 May 2018 23:43)  T(F): 98.8 (28 May 2018 23:43), Max: 98.8 (28 May 2018 23:43)  HR: 89 (29 May 2018 05:20) (73 - 89)  BP: 138/75 (29 May 2018 05:20) (138/75 - 170/73)  BP(mean): --  RR: 17 (29 May 2018 05:20) (17 - 20)  SpO2: 98% (29 May 2018 05:20) (98% - 99%)    PHYSICAL EXAM  GENERAL: The patient is awake and alert, tachypneic  HEENT: Head is normocephalic and atraumatic. Mucous membranes are moist.  NECK: Supple.  LUNGS: Clear to auscultation BL without wheezing, rales or rhonchi; respirations unlabored  HEART: Tachycardic, irregular rhythm ,+S1/+S2, no murmurs, rubs, gallops  ABDOMEN: Soft, nontender, and nondistended, no rebound, guarding rigidity, bowel sounds in all 4 quadrants  EXTREMITIES: Left AKA stump with edema. No periwound erythema, discharge, drainage is noted. Right 3+ popliteal, 2+ dorsalis pedis pulse. Capillary refill is less than 2 seconds. No cyanosis.  SKIN: No new rashes or lesions.  MSK: strength equal BL  NEUROLOGIC: Grossly intact.  PSYCH: No new changes.    Portable X-ray: Shows new opacity in the right lower lobe compared to previous Chest X-Ray on 05/20/2018.  Lactate reported: 5.9     Assessment- Rapid Response called for 88y year old Male admitted for AKA due to non salvageable arterial insufficiency for rectal temperature, hypoxia and tachycardia.  Patient known to have paroxysmal AFib. Source of infection likely C. diff, will treat empirically with PO vancomycin. In addition, new opacities noted on lower right lobe on portable chest X-ray. Will cover for sepsis with Zosyn. O2 Saturation after evaluation was 96% on nonrebreather.    Plan-  Stat lactate and cultures (blood x 2, urine), Procalcitonin  2000 ml bolus IV, will adjust pending lactate results  EKG   Chest x-ray stat  CBC, CMP, Mag and Phosphorus,   Testing for C.diff given possible abx use just prior to admission  ABX- Zosyn 4.5g now, then q 6 hours.  Vancomycin 125mg PO q 6 hours treating for cdiff  Will reassess pt in 1 and 3 hours pending results of lactate  ID consultation with Dr. Dayday Tate.  Case presented to primary team Dr. Fry by REHAN Benjamin. O2 Saturation will be monitored and CTA will be deferred for now due to o2  saturation being 96 on room temp        9:32 am Addendum-  Current lactate is 5.9  Lactate to be repeated stat s/p fluids  continue abx and awaiting further lab reports  c.diff pending  transfer to [facility] PGY 3        10:32 am Addendum-  Patient reports feeling better  Repeat lactate is 2.5  Will repeat another lactate at 11 am  will continue with fluids  Infectious Disease consult pending  Will discontinue zosyn if c.diff is confirmed positive  Vascular Surgery updated and will follow patient.  Rapid response team to follow  [facility] PGY 3                        10.3   4.9   )-----------( 263      ( 29 May 2018 09:57 )             32.2   05-29    142  |  104  |  11.0  ----------------------------<  107  3.5   |  22.0  |  0.84    Ca    8.0<L>      29 May 2018 09:55  Phos  1.9     05-29  Mg     1.8     05-29    TPro  5.5<L>  /  Alb  2.6<L>  /  TBili  0.9  /  DBili  x   /  AST  42<H>  /  ALT  43<H>  /  AlkPhos  103  05-29 Rapid Response/code sepsis PGY 3 Note    HPI:  88 year old male w/PMH of Alzheimers, prostate ca s/p prostatectomy transferred here from [place] with pulselessness, pain and numbness to [facility] that developed around 9pm . Patients family reports a recent diagnosis of DVT to [facility] and started Lovenox / coumadin therapy about 10 days ago with a recent INR of 2.3 a few days ago, however INR - 4.2 on repeat labs here . Lactate uptrending from 2.6 to 4.7 . Pt comes in on cardene gtt for SBP > 200 .     Pts family report no history of vascular intervention .   Pt complains of pain despite medication, loss of sensation and ability to move leg.  Pt normally ambulates independently at home. (20 May 2018 06:48)    Code sepsis called because of desaturation, rectal fever, hypoxia, dyspnea, confusion, hypotension, tachypnea, tachycardia  Vital signs at code include a rectal temp of  102  ,  of    , /95   , RR 30  and O2 saturation of 80 % on nonrebreather.  No recent labs.  Patient is at baseline  Pt has source of infection, he developed water diarrhea this morning.    Allergies  No Known Allergies    PAST MEDICAL & SURGICAL HISTORY:  DVT (deep venous thrombosis)  Cataract of both eyes  Prostate CA  AAA (abdominal aortic aneurysm)  Alzheimer disease  Hypertension  DVT (deep venous thrombosis)  AAA (abdominal aortic aneurysm): 5cm  Right iliac aneurysm 4cm  Alzheimer disease  Cataract  Prostate ca  Gallstones  History of cataract surgery  H/O prostatectomy  History of cholecystectomy  H/O prostatectomy: 1998  Status post cataract surgery: bilateral  History of cholecystectomy: 1997    Vital Signs Last 24 Hrs  T(C): 37.1 (28 May 2018 23:43), Max: 37.1 (28 May 2018 23:43)  T(F): 98.8 (28 May 2018 23:43), Max: 98.8 (28 May 2018 23:43)  HR: 89 (29 May 2018 05:20) (73 - 89)  BP: 138/75 (29 May 2018 05:20) (138/75 - 170/73)  BP(mean): --  RR: 17 (29 May 2018 05:20) (17 - 20)  SpO2: 98% (29 May 2018 05:20) (98% - 99%)    PHYSICAL EXAM  GENERAL: The patient is awake and alert, tachypneic  HEENT: Head is normocephalic and atraumatic. Mucous membranes are moist.  NECK: Supple.  LUNGS: Clear to auscultation BL without wheezing, rales or rhonchi; respirations unlabored  HEART: Tachycardic, irregular rhythm ,+S1/+S2, no murmurs, rubs, gallops  ABDOMEN: Soft, nontender, and nondistended, no rebound, guarding rigidity, bowel sounds in all 4 quadrants  EXTREMITIES: Left AKA stump with edema. No periwound erythema, discharge, drainage is noted. Right 3+ popliteal, 2+ dorsalis pedis pulse. Capillary refill is less than 2 seconds. No cyanosis.  SKIN: No new rashes or lesions.  MSK: strength equal BL  NEUROLOGIC: Grossly intact.  PSYCH: No new changes.    Portable X-ray: Shows new opacity in the right lower lobe compared to previous Chest X-Ray on 05/20/2018.  Lactate reported: 5.9     Assessment- Rapid Response called for 88y year old Male admitted for AKA due to non salvageable arterial insufficiency for rectal temperature, hypoxia and tachycardia.  Patient known to have paroxysmal AFib. Source of infection likely C. diff, will treat empirically with PO vancomycin. In addition, new opacities noted on lower right lobe on portable chest X-ray. Will cover for sepsis with Zosyn. O2 Saturation after evaluation was 96% on nonrebreather.    Plan-  Stat lactate and cultures (blood x 2, urine), Procalcitonin  2000 ml bolus IV, will adjust pending lactate results  EKG   Chest x-ray stat  CBC, CMP, Mag and Phosphorus,   Testing for C.diff given possible abx use just prior to admission  ABX- Zosyn 4.5g now, then q 6 hours.  Vancomycin 125mg PO q 6 hours treating for cdiff  Will reassess pt in 1 and 3 hours pending results of lactate  ID consultation with Dr. Dayday Tate.  Case presented to primary team Dr. Fry by REHAN Benjamin. O2 Saturation will be monitored and CTA will be deferred for now due to o2  saturation being 96 on room temp        9:32 am Addendum-  Current lactate is 5.9  Lactate to be repeated stat s/p fluids  continue abx and awaiting further lab reports  c.diff pending  transfer to [facility] PGY 3        10:32 am Addendum-  Patient reports feeling better  Repeat lactate is 2.5  Will repeat another lactate at 11 am  will continue with fluids  Infectious Disease consult pending  Will discontinue zosyn if c.diff is confirmed positive  Vascular Surgery updated and will follow patient.  Rapid response team to follow  [facility] PGY 3                        10.3   4.9   )-----------( 263      ( 29 May 2018 09:57 )             32.2   05-29    142  |  104  |  11.0  ----------------------------<  107  3.5   |  22.0  |  0.84    Ca    8.0<L>      29 May 2018 09:55  Phos  1.9     05-29  Mg     1.8     05-29    TPro  5.5<L>  /  Alb  2.6<L>  /  TBili  0.9  /  DBili  x   /  AST  42<H>  /  ALT  43<H>  /  AlkPhos  103  05-29        11:41 am Addendum  Patient found to be negative for c.diff  PO vanco discontinued. IV vanco ordered   Will continue to monitor bowel movements  lactate to be repeated

## 2018-05-29 NOTE — PROGRESS NOTE ADULT - SUBJECTIVE AND OBJECTIVE BOX
SOSA PERRY    [id number]    History:      Patient is s/p left aka, awaiting dispo to acute rehab   Patient seen during AM rounds.  Patient with noticeable wheezing and shivering in bed.  Bed side vitals exhibit fever, hypoxia, tachycardia and hypertension  patient placed on Non rebreather with supplementation O2  Head of bed elevated to 70 degrees  rapid Response code activated   Rn team reports onset of diarrhea last night     Vital Signs Last 24 Hrs  T(C): 37 (29 May 2018 08:00), Max: 37.1 (28 May 2018 23:43)  T(F): 98.6 (29 May 2018 08:00), Max: 98.8 (28 May 2018 23:43)  HR: 79 (29 May 2018 08:00) (73 - 89)  BP: 199/89 (29 May 2018 08:00) (138/75 - 199/89)  BP(mean): --  RR: 22 (29 May 2018 08:00) (17 - 22)  SpO2: 81% (29 May 2018 08:00) (81% - 99%)  I&O's Summary    28 May 2018 07:01  -  29 May 2018 07:00  --------------------------------------------------------  IN: 500 mL / OUT: 540 mL / NET: -40 mL                  MEDICATIONS  (STANDING):  acetaminophen   Tablet. 650 milliGRAM(s) Oral every 6 hours  aspirin  chewable 81 milliGRAM(s) Oral daily  atorvastatin 10 milliGRAM(s) Oral at bedtime  docusate sodium 100 milliGRAM(s) Oral three times a day  donepezil 10 milliGRAM(s) Oral at bedtime  gabapentin 300 milliGRAM(s) Oral three times a day  heparin  Injectable 5000 Unit(s) SubCutaneous every 8 hours  lisinopril 5 milliGRAM(s) Oral daily  melatonin 5 milliGRAM(s) Oral at bedtime  multiple electrolytes Injection Type 1 1000 milliLiter(s) (100 mL/Hr) IV Continuous <Continuous>  multiple electrolytes Injection Type 1 Bolus 1000 milliLiter(s) IV Bolus once  multiple electrolytes Injection Type 1 Bolus 1000 milliLiter(s) IV Bolus once  multiple electrolytes Injection Type 1 Bolus 700 milliLiter(s) IV Bolus once  pantoprazole    Tablet 40 milliGRAM(s) Oral before breakfast  piperacillin/tazobactam IVPB. 4.5 Gram(s) IV Intermittent once  piperacillin/tazobactam IVPB. 4.5 Gram(s) IV Intermittent every 6 hours  senna 2 Tablet(s) Oral at bedtime  tamsulosin 0.4 milliGRAM(s) Oral at bedtime  vancomycin    Solution 125 milliGRAM(s) Oral every 6 hours    MEDICATIONS  (PRN):  bisacodyl Suppository 10 milliGRAM(s) Rectal daily PRN Constipation      Physical exam:     Alert alert and responsive to questioning   Left aka site intact, edema present  rle with ecchymosis , warm   chest  with coarse breath sounds   abdomen distended, soft     Chest Xray:  Preliminary read , RLL consolidation      Assessment:  • Acute hypoxia, fever, tachycardia    suspected sepsis, workup currently in progress for source    Probable pna vs c-diff vs. urosepsis    •   Plan:   -cancel discharge to acute rehab  • continue DVT prophylaxis as prescribed,  • IV antibiotics, post blood cultures  - Post Rapid response eval,  patient may require ICU evaluation   - Dr Fry notified , will get Chest CTA post septic work up to rule out Pulmonary Embolism

## 2018-05-29 NOTE — CONSULT NOTE ADULT - PROBLEM SELECTOR RECOMMENDATION 2
EKG with non specific changes. read as afib although discernable p waves. Most likely sinus.   Patient with unclear cardiac history.   At this time, CLI. No cardiac contraindications to planned AKA. Will further risk stratify post procedure.
Stool for C Diff negative  Do GI stool PCR

## 2018-05-29 NOTE — CONSULT NOTE ADULT - SUBJECTIVE AND OBJECTIVE BOX
[facility]  INFECTIOUS DISEASES AND INTERNAL MEDICINE at [place]  =======================================================  Sina Del Valle MD  Diplomates American Board of Internal Medicine and Infectious Diseases  =======================================================      [id number]  SOSA PERRY   History obtained from the chart. Patient not able to provide history     CC: Patient is a 88y old  Male who presents with a chief complaint of cold L leg (25 May 2018 15:12)    87y/o  Male with h/o Dementia, prostate Ca. Here after being transferred from [facility] for LLE pulselessness. Patient was taken to the OR 5/21/18 for Left AKA. Patient had post op ileus which resolved on POD 3. Patient was awaiting discharge to rehab. Today developed tachycardia, tachypnea, fever and hypoxia. Also noted to have diarrhea. Started on IV Vancomycin and Zosyn. ID input requested.       Past Medical & Surgical Hx:  DVT (deep venous thrombosis)  Cataract of both eyes  Prostate CA  Alzheimer disease  Hypertension  DVT (deep venous thrombosis)  AAA (abdominal aortic aneurysm): 5cm  Right iliac aneurysm 4cm  Alzheimer disease  Cataract  Prostate ca  Gallstones  History of cataract surgery  H/O prostatectomy  History of cholecystectomy  H/O prostatectomy: 1998  Status post cataract surgery: bilateral  History of cholecystectomy: 1997      Social Hx:   Unable to obtain, patient is lethargic      FAMILY HISTORY:  No pertinent family history in first degree relatives  Family history of Alzheimer's disease (Sibling)  Family history of heart disease (Father)      Allergies  No Known Allergies      Antibiotics:  piperacillin/tazobactam IVPB. 3.375 Gram(s) IV Intermittent every 8 hours  vancomycin  IVPB 1000 milliGRAM(s) IV Intermittent every 12 hours       REVIEW OF SYSTEMS:  Unable to obtain, patient is lethargic, not answering questions      Physical Exam:  Vital Signs Last 24 Hrs  T(C): 37 (29 May 2018 08:00), Max: 37.1 (28 May 2018 23:43)  T(F): 98.6 (29 May 2018 08:00), Max: 98.8 (28 May 2018 23:43)  HR: 11 (29 May 2018 10:00) (11 - 89)  BP: 155/77 (29 May 2018 10:00) (138/75 - 199/89)  RR: 22 (29 May 2018 08:00) (17 - 22)  SpO2: 81% (29 May 2018 08:00) (81% - 99%)      GEN: Lethargic, not responsive to questions   HEENT: normocephalic and atraumatic. EOMI. PERRL.    NECK: Supple.   LUNGS: Shallow BS B/L  HEART: Regular rate and rhythm  ABDOMEN: Soft, nontender, and nondistended.  diminished bowel sounds.    : No CVA tenderness  EXTREMITIES: RLE edema. LLE AKA site clean  MSK: No joint swelling  NEUROLOGIC: Lethargic  PSYCHIATRIC: Lethargic   SKIN: No ulceration or induration present.      Labs:  05-29    142  |  104  |  11.0  ----------------------------<  107  3.5   |  22.0  |  0.84    Ca    8.0<L>      29 May 2018 09:55  Phos  1.9     05-29  Mg     1.8     05-29    TPro  5.5<L>  /  Alb  2.6<L>  /  TBili  0.9  /  DBili  x   /  AST  42<H>  /  ALT  43<H>  /  AlkPhos  103  05-29                          10.3   4.9   )-----------( 263      ( 29 May 2018 09:57 )             32.2     LIVER FUNCTIONS - ( 29 May 2018 09:55 )  Alb: 2.6 g/dL / Pro: 5.5 g/dL / ALK PHOS: 103 U/L / ALT: 43 U/L / AST: 42 U/L / GGT: x               EXAM:  XR CHEST AP OR PA 1V                        PROCEDURE DATE:  05/29/2018    INTERPRETATION:  CHEST AP PORTABLE:  History: rapid response.   Date and time of exam: 5/29/2018 8:23 AM.  Technique: A single AP view of the chest wasobtained.  Comparison exam: 5/20/2018.  Findings:  Cardiomegaly. The lungs are clear of acute infiltrate. No hilar or   mediastinal abnormality. No evidence of pleural effusion or pneumothorax..  Impression:  Cardiomegaly. Clear lungs. Stable exam without significant change since   the previous study.

## 2018-05-29 NOTE — CHART NOTE - NSCHARTNOTEFT_GEN_A_CORE
88 year old male w/PMH of Alzheimers, prostate ca s/p prostatectomy transferred here from [place] with pulselessness, pain and numbness, patient is status post BKA day #1.  Rapid response was called because patient is lethargic, as per nurse, over the past couple of hours patient is gradually getting less and less responsive and only opening his eyes in response to his name being called. When evaluated by the medical team patient is not following commands but is opening his eyes in response to his name being called loudly.     Vitals during rapid:  Blood glucose: 138  HR: 95 sinus   RR: 16   Spo2: 94% on 3L nasal canula Blood pressure: 130/70    ICU Vital Signs Last 24 Hrs  T(C): 37 (29 May 2018 08:00), Max: 37.1 (28 May 2018 23:43)  T(F): 98.6 (29 May 2018 08:00), Max: 98.8 (28 May 2018 23:43)  HR: 11 (29 May 2018 10:00) (11 - 89)  BP: 155/77 (29 May 2018 10:00) (138/75 - 199/89)  BP(mean): --  ABP: --  ABP(mean): --  RR: 22 (29 May 2018 08:00) (17 - 22)  SpO2: 81% (29 May 2018 08:00) (81% - 99%) 88 year old male w/PMH of Alzheimers, prostate ca s/p prostatectomy transferred here from [place] with pulselessness, pain and numbness, patient is status post BKA day #1.  Rapid response was called because patient is lethargic, as per nurse, over the past couple of hours patient is gradually getting less and less responsive and only opening his eyes in response to his name being called. When evaluated by the medical team patient is not following commands but is opening his eyes in response to his name being called loudly.  Code sepsis was called earlier during the day,  patient had elevated rectal temperature, had  bouts of liquid diarrhea during the day, however C. Diff was negative. Source of sepsis was thought to be UTI. During previous code sepsis, patient received 30 cc/kg of fluid and started on vanco + zosyn.   Cultures already sent.      Vitals during rapid:  Blood glucose: 138  HR: 95 sinus   RR: 16   Spo2: 94% on 3L nasal canula Blood pressure: 130/70    ICU Vital Signs Last 24 Hrs  T(C): 37 (29 May 2018 08:00), Max: 37.1 (28 May 2018 23:43)  T(F): 98.6 (29 May 2018 08:00), Max: 98.8 (28 May 2018 23:43)  HR: 11 (29 May 2018 10:00) (11 - 89)  BP: 155/77 (29 May 2018 10:00) (138/75 - 199/89)  BP(mean): --  ABP: --  ABP(mean): --  RR: 22 (29 May 2018 08:00) (17 - 22)  SpO2: 81% (29 May 2018 08:00) (81% - 99%)      Physical exam: 88 year old male w/PMH of Alzheimers, prostate ca s/p prostatectomy transferred here from [place] with pulselessness, pain and numbness, patient is status post BKA day #1.  Rapid response was called because patient is lethargic, as per nurse, over the past couple of hours patient is gradually getting less and less responsive and only opening his eyes in response to his name being called. When evaluated by the medical team patient is not following commands but is opening his eyes in response to his name being called loudly.  Code sepsis was called earlier during the day,  patient had elevated rectal temperature, had  bouts of liquid diarrhea during the day, however C. Diff was negative. Source of sepsis was thought to be UTI. During previous code sepsis, patient received 30 cc/kg of fluid and started on vanco + zosyn.   Cultures already sent.      Vitals during rapid:  Blood glucose: 138  HR: 95 sinus   RR: 16   Spo2: 94% on 3L nasal canula Blood pressure: 130/70    ICU Vital Signs Last 24 Hrs  T(C): 37 (29 May 2018 08:00), Max: 37.1 (28 May 2018 23:43)  T(F): 98.6 (29 May 2018 08:00), Max: 98.8 (28 May 2018 23:43)  HR: 11 (29 May 2018 10:00) (11 - 89)  BP: 155/77 (29 May 2018 10:00) (138/75 - 199/89)  BP(mean): --  ABP: --  ABP(mean): --  RR: 22 (29 May 2018 08:00) (17 - 22)  SpO2: 81% (29 May 2018 08:00) (81% - 99%)      Physical exam:  General: somonlent, open his eyes in response to his name being called  HEENT: pupils 2mm bilaterally, eomi   Neck: No JVD, supple, normal thyroid gland  Cardiac: s1/s2/rrr/no s4 or s3, no murmurs  Pulmonary:   bibasilar crackles  Abdominal: distended, patient winces in response to his abdomen being palpated, bowel sounds positive  Extremities: no edema, no cyanosis, 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   Neurological:   somnolent, open his eyes in response to his name being called, grossly moving upper extremities, unable to evaluate cranial nerves,      Labs:                           10.3   4.9   )-----------( 263      ( 29 May 2018 09:57 )             32.2       05-29    142  |  104  |  11.0  ----------------------------<  107  3.5   |  22.0  |  0.84    Ca    8.0<L>      29 May 2018 09:55  Phos  1.9     05-29  Mg     1.8     05-29    TPro  5.5<L>  /  Alb  2.6<L>  /  TBili  0.9  /  DBili  x   /  AST  42<H>  /  ALT  43<H>  /  AlkPhos  103  05-29        Procalcitonin: 1.1       A/P:  88 year old male w/PMH of Alzheimers, prostate ca s/p prostatectomy transferred here from [place] with pulselessness, pain and numbness, patient is status post BKA day #1.  Rapid response was called because patient is lethargic, as per nurse, over the past couple of hours patient is gradually getting less and less responsive and only opening his eyes in response to his name being called.    - somnolence most likely due to sepsis   - source most likely UTI, patient had Méndez placed previously, awaiting urinalysis and urine cultures   - patient on broad spectrum antibiotics,  vanco + zosyn, already got 30 cc/kg two hours prior, lactate now < 2  - ABG without acidosis, bicarb normal   - patient did not receive CNS depressant medications except gabapentin at 5 AM   -  CT of head negative for acute hemorrhage   - subsequent care of patient taken over by SICU, case discussed with vascular attending 88 year old male w/PMH of Alzheimers, prostate ca s/p prostatectomy transferred here from [place] with pulselessness, pain and numbness, patient is status post BKA day #1.  Rapid response was called because patient is lethargic, as per nurse, over the past couple of hours patient is gradually getting less and less responsive and only opening his eyes in response to his name being called. When evaluated by the medical team patient is not following commands but is opening his eyes in response to his name being called loudly.  Code sepsis was called earlier during the day,  patient had elevated rectal temperature, had  bouts of liquid diarrhea during the day, however C. Diff was negative. Source of sepsis was thought to be UTI. During previous code sepsis, patient received 30 cc/kg of fluid and started on vanco + zosyn.   Cultures already sent.      Vitals during rapid:  Blood glucose: 138  HR: 95 sinus   RR: 16   Spo2: 94% on 3L nasal canula Blood pressure: 130/70    ICU Vital Signs Last 24 Hrs  T(C): 37 (29 May 2018 08:00), Max: 37.1 (28 May 2018 23:43)  T(F): 98.6 (29 May 2018 08:00), Max: 98.8 (28 May 2018 23:43)  HR: 11 (29 May 2018 10:00) (11 - 89)  BP: 155/77 (29 May 2018 10:00) (138/75 - 199/89)  BP(mean): --  ABP: --  ABP(mean): --  RR: 22 (29 May 2018 08:00) (17 - 22)  SpO2: 81% (29 May 2018 08:00) (81% - 99%)      Physical exam:  General: somonlent, open his eyes in response to his name being called  HEENT: pupils 2mm bilaterally, eomi   Neck: No JVD, supple, normal thyroid gland  Cardiac: s1/s2/rrr/no s4 or s3, no murmurs  Pulmonary:   bibasilar crackles  Abdominal: distended, patient winces in response to his abdomen being palpated, bowel sounds positive  Extremities: no edema, no cyanosis, 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   Neurological:   somnolent, open his eyes in response to his name being called, grossly moving upper extremities, unable to evaluate cranial nerves,      Labs:                           10.3   4.9   )-----------( 263      ( 29 May 2018 09:57 )             32.2       05-29    142  |  104  |  11.0  ----------------------------<  107  3.5   |  22.0  |  0.84    Ca    8.0<L>      29 May 2018 09:55  Phos  1.9     05-29  Mg     1.8     05-29    TPro  5.5<L>  /  Alb  2.6<L>  /  TBili  0.9  /  DBili  x   /  AST  42<H>  /  ALT  43<H>  /  AlkPhos  103  05-29        Procalcitonin: 1.1       A/P:  88 year old male w/PMH of Alzheimers, prostate ca s/p prostatectomy transferred here from [place] with pulselessness, pain and numbness, patient is status post BKA day #1.  Rapid response was called because patient is lethargic, as per nurse, over the past couple of hours patient is gradually getting less and less responsive and only opening his eyes in response to his name being called.    - somnolence most likely due to sepsis   - source most likely UTI, patient had Méndez placed previously, awaiting urinalysis and urine cultures   - patient on broad spectrum antibiotics,  vanco + zosyn, already got 30 cc/kg two hours prior, lactate now < 2  - ABG without acidosis, bicarb normal   - patient did not receive CNS depressant medications except gabapentin at 5 AM   -  CT of head negative for acute hemorrhage   - CT abdomen awaiting official reading   - CT lungs awaiting official reading   - subsequent care of patient taken over by SICU, case discussed with vascular attending 88 year old male w/PMH of Alzheimers, prostate ca s/p prostatectomy transferred here from [place] with pulselessness, pain and numbness, patient is status post BKA day #1.  Rapid response was called because patient is lethargic, as per nurse, over the past couple of hours patient is gradually getting less and less responsive and only opening his eyes in response to his name being called. When evaluated by the medical team patient is not following commands but is opening his eyes in response to his name being called loudly.  Code sepsis was called earlier during the day,  patient had elevated rectal temperature, had  bouts of liquid diarrhea during the day, however C. Diff was negative. Source of sepsis was thought to be UTI. During previous code sepsis, patient received 30 cc/kg of fluid and started on vanco + zosyn.   Cultures already sent.      Vitals during rapid:  Blood glucose: 138  HR: 95 sinus   RR: 16   Spo2: 94% on 3L nasal canula Blood pressure: 130/70    ICU Vital Signs Last 24 Hrs  T(C): 37 (29 May 2018 08:00), Max: 37.1 (28 May 2018 23:43)  T(F): 98.6 (29 May 2018 08:00), Max: 98.8 (28 May 2018 23:43)  HR: 11 (29 May 2018 10:00) (11 - 89)  BP: 155/77 (29 May 2018 10:00) (138/75 - 199/89)  BP(mean): --  ABP: --  ABP(mean): --  RR: 22 (29 May 2018 08:00) (17 - 22)  SpO2: 81% (29 May 2018 08:00) (81% - 99%)      Physical exam:  General: somonlent, open his eyes in response to his name being called  HEENT: pupils 2mm bilaterally, eomi   Neck: No JVD, supple, normal thyroid gland  Cardiac: s1/s2/rrr/no s4 or s3, no murmurs  Pulmonary:   bibasilar crackles  Abdominal: distended, patient winces in response to his abdomen being palpated, bowel sounds positive  Extremities:   no cyanosis, 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 with edema, but no exudate    Neurological:   somnolent, open his eyes in response to his name being called, grossly moving upper extremities, unable to evaluate cranial nerves,      Labs:                           10.3   4.9   )-----------( 263      ( 29 May 2018 09:57 )             32.2       05-29    142  |  104  |  11.0  ----------------------------<  107  3.5   |  22.0  |  0.84    Ca    8.0<L>      29 May 2018 09:55  Phos  1.9     05-29  Mg     1.8     05-29    TPro  5.5<L>  /  Alb  2.6<L>  /  TBili  0.9  /  DBili  x   /  AST  42<H>  /  ALT  43<H>  /  AlkPhos  103  05-29        Procalcitonin: 1.1       A/P:  88 year old male w/PMH of Alzheimers, prostate ca s/p prostatectomy transferred here from [place] with pulselessness, pain and numbness, patient is status post BKA day #1.  Rapid response was called because patient is lethargic, as per nurse, over the past couple of hours patient is gradually getting less and less responsive and only opening his eyes in response to his name being called.    - somnolence most likely due to sepsis   - source most likely UTI, patient had Méndez placed previously, awaiting urinalysis and urine cultures   - patient on broad spectrum antibiotics,  vanco + zosyn, already got 30 cc/kg two hours prior, lactate now < 2  - ABG without acidosis, bicarb normal   - patient did not receive CNS depressant medications except gabapentin at 5 AM   -  CT of head negative for acute hemorrhage   - CT abdomen awaiting official reading   - CT lungs awaiting official reading   - subsequent care of patient taken over by SICU, case discussed with vascular attending

## 2018-05-29 NOTE — CONSULT NOTE ADULT - PROBLEM SELECTOR RECOMMENDATION 9
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 Plan for AKA.
Blood cultures pending  UA not done, place Méndez and do UA and urine culture  Do CT angio chest and CT A/P   Will continue Zosyn  Surgical site looks clean  D/C Vancomycin  RLE with swelling, looks like hematoma, will do Duplex  Will follow up work up

## 2018-05-29 NOTE — PROGRESS NOTE ADULT - SUBJECTIVE AND OBJECTIVE BOX
Patient in bed, fatigued.   Follows commands, but lethargic.   Report pain in the left LE.   Minimal command following.   Lactate is downtrending.   CDIFF is negative.   Patient having elevated SBPs, last 199/89.    FUNCTIONAL PROGRESS  5/28  Bed Mobility  Bed Mobility Training Symptoms Noted During/After Treatment: increased pain;  fatigue;  shortness of breath  Bed Mobility Training Scooting: maximum assist (25% patient effort);  2 person assist;  bed rails  Bed Mobility Training Sit-to-Supine: maximum assist (25% patient effort);  2 person assist;  bed rails  Bed Mobility Training Supine-to-Sit: maximum assist (25% patient effort);  1 person assist;  bed rails;  HOB elevated  Bed Mobility Training Limitations: decreased ability to use legs for bridging/pushing;  impaired ability to control trunk for mobility;  decreased ability to use arms for pushing/pulling;  pain;  decreased strength    Sit-Stand Transfer Training  Sit-to-Stand Transfer Training Treatment not Performed: unable to perform       REVIEW OF SYSTEMS  Constitutional - No fever,  +fatigue  Neurological - +loss of strength  Musculoskeletal - +joint pain, +joint swelling, +muscle pain    VITALS  T(C): 37 (05-29-18 @ 08:00), Max: 37.1 (05-28-18 @ 23:43)  HR: 11 (05-29-18 @ 10:00) (11 - 89)  BP: 155/77 (05-29-18 @ 10:00) (138/75 - 199/89)  RR: 22 (05-29-18 @ 08:00) (17 - 22)  SpO2: 81% (05-29-18 @ 08:00) (81% - 99%)  Wt(kg): --    MEDICATIONS   acetaminophen   Tablet. 650 milliGRAM(s) every 6 hours  aspirin  chewable 81 milliGRAM(s) daily  atorvastatin 10 milliGRAM(s) at bedtime  bisacodyl Suppository 10 milliGRAM(s) daily PRN  docusate sodium 100 milliGRAM(s) three times a day  donepezil 10 milliGRAM(s) at bedtime  gabapentin 300 milliGRAM(s) three times a day  heparin  Injectable 5000 Unit(s) every 8 hours  lisinopril 5 milliGRAM(s) daily  melatonin 5 milliGRAM(s) at bedtime  multiple electrolytes Injection Type 1 1000 milliLiter(s) <Continuous>  pantoprazole    Tablet 40 milliGRAM(s) before breakfast  piperacillin/tazobactam IVPB. 3.375 Gram(s) every 8 hours  senna 2 Tablet(s) at bedtime  tamsulosin 0.4 milliGRAM(s) at bedtime  vancomycin    Solution 125 milliGRAM(s) every 6 hours      RECENT LABS/IMAGING  CBC Full  -  ( 29 May 2018 09:57 )  WBC Count : 4.9 K/uL  Hemoglobin : 10.3 g/dL  Hematocrit : 32.2 %  Platelet Count - Automated : 263 K/uL  Mean Cell Volume : 97.9 fl  Mean Cell Hemoglobin : 31.3 pg  Mean Cell Hemoglobin Concentration : 32.0 g/dL  Auto Neutrophil # : x  Auto Lymphocyte # : x  Auto Monocyte # : x  Auto Eosinophil # : x  Auto Basophil # : x  Auto Neutrophil % : 92.0 %  Auto Lymphocyte % : 7.0 %  Auto Monocyte % : 1.0 %  Auto Eosinophil % : x  Auto Basophil % : x    05-29    142  |  104  |  11.0  ----------------------------<  107  3.5   |  22.0  |  0.84    Ca    8.0<L>      29 May 2018 09:55  Phos  1.9     05-29  Mg     1.8     05-29    TPro  5.5<L>  /  Alb  2.6<L>  /  TBili  0.9  /  DBili  x   /  AST  42<H>  /  ALT  43<H>  /  AlkPhos  103  05-29      ----------------------------------------------------------------------------------------  PHYSICAL EXAM  Constitutional - NAD   Extremities - Right AKA, incision - CDI, Staples, Swollen, pain to palpation  Neurologic Exam -                    Cognitive - Awakens to voice, lethargic     Motor -   Minimal command following due to lethargy    Psychiatric - Lethargic   ----------------------------------------------------------------------------------------  ASSESSMENT/PLAN  88yMale with functional deficits after left AKA   Pain - Tylenol, Neurontin, Dilaudid  DVT PPX - SCDs, Heparin  Sepsis - Zosyn, Vanco  Rehab - Medically being optimized, currently lethargic and not ready for rehab. Will continue to follow.    If patient improves with arousal and medically stable, continue to R=recommend ACUTE inpatient rehabilitation for the functional deficits consisting of 3 hours of therapy/day & 24 hour RN/daily PMR physician for comorbid medical management. Will continue to follow for ongoing rehab needs and recommendations. Patient will be able to tolerate 3 hours a day. Despite cognitive status, patient was independent prior. Do not expect patient to have a prosthesis long term, but short term would need rehab to assist with transfers for  as his primary mode of ambulation.

## 2018-05-30 DIAGNOSIS — A41.50 GRAM-NEGATIVE SEPSIS, UNSPECIFIED: ICD-10-CM

## 2018-05-30 LAB
-  AMIKACIN: SIGNIFICANT CHANGE UP
-  AMIKACIN: SIGNIFICANT CHANGE UP
-  AMPICILLIN/SULBACTAM: SIGNIFICANT CHANGE UP
-  AMPICILLIN/SULBACTAM: SIGNIFICANT CHANGE UP
-  AMPICILLIN: SIGNIFICANT CHANGE UP
-  AMPICILLIN: SIGNIFICANT CHANGE UP
-  AZTREONAM: SIGNIFICANT CHANGE UP
-  AZTREONAM: SIGNIFICANT CHANGE UP
-  CEFAZOLIN: SIGNIFICANT CHANGE UP
-  CEFAZOLIN: SIGNIFICANT CHANGE UP
-  CEFEPIME: SIGNIFICANT CHANGE UP
-  CEFEPIME: SIGNIFICANT CHANGE UP
-  CEFOXITIN: SIGNIFICANT CHANGE UP
-  CEFOXITIN: SIGNIFICANT CHANGE UP
-  CEFTRIAXONE: SIGNIFICANT CHANGE UP
-  CEFTRIAXONE: SIGNIFICANT CHANGE UP
-  CIPROFLOXACIN: SIGNIFICANT CHANGE UP
-  CIPROFLOXACIN: SIGNIFICANT CHANGE UP
-  ERTAPENEM: SIGNIFICANT CHANGE UP
-  ERTAPENEM: SIGNIFICANT CHANGE UP
-  GENTAMICIN: SIGNIFICANT CHANGE UP
-  GENTAMICIN: SIGNIFICANT CHANGE UP
-  LEVOFLOXACIN: SIGNIFICANT CHANGE UP
-  LEVOFLOXACIN: SIGNIFICANT CHANGE UP
-  MEROPENEM: SIGNIFICANT CHANGE UP
-  MEROPENEM: SIGNIFICANT CHANGE UP
-  PIPERACILLIN/TAZOBACTAM: SIGNIFICANT CHANGE UP
-  PIPERACILLIN/TAZOBACTAM: SIGNIFICANT CHANGE UP
-  TOBRAMYCIN: SIGNIFICANT CHANGE UP
-  TOBRAMYCIN: SIGNIFICANT CHANGE UP
-  TRIMETHOPRIM/SULFAMETHOXAZOLE: SIGNIFICANT CHANGE UP
-  TRIMETHOPRIM/SULFAMETHOXAZOLE: SIGNIFICANT CHANGE UP
ANION GAP SERPL CALC-SCNC: 12 MMOL/L — SIGNIFICANT CHANGE UP (ref 5–17)
ANION GAP SERPL CALC-SCNC: 13 MMOL/L — SIGNIFICANT CHANGE UP (ref 5–17)
APTT BLD: 186.2 SEC — CRITICAL HIGH (ref 27.5–37.4)
APTT BLD: 50.8 SEC — HIGH (ref 27.5–37.4)
BASOPHILS # BLD AUTO: 0 K/UL — SIGNIFICANT CHANGE UP (ref 0–0.2)
BASOPHILS NFR BLD AUTO: 0.2 % — SIGNIFICANT CHANGE UP (ref 0–2)
BUN SERPL-MCNC: 15 MG/DL — SIGNIFICANT CHANGE UP (ref 8–20)
BUN SERPL-MCNC: 15 MG/DL — SIGNIFICANT CHANGE UP (ref 8–20)
CALCIUM SERPL-MCNC: 7.6 MG/DL — LOW (ref 8.6–10.2)
CALCIUM SERPL-MCNC: 8 MG/DL — LOW (ref 8.6–10.2)
CHLORIDE SERPL-SCNC: 104 MMOL/L — SIGNIFICANT CHANGE UP (ref 98–107)
CHLORIDE SERPL-SCNC: 104 MMOL/L — SIGNIFICANT CHANGE UP (ref 98–107)
CO2 SERPL-SCNC: 25 MMOL/L — SIGNIFICANT CHANGE UP (ref 22–29)
CO2 SERPL-SCNC: 25 MMOL/L — SIGNIFICANT CHANGE UP (ref 22–29)
CREAT SERPL-MCNC: 0.97 MG/DL — SIGNIFICANT CHANGE UP (ref 0.5–1.3)
CREAT SERPL-MCNC: 1.07 MG/DL — SIGNIFICANT CHANGE UP (ref 0.5–1.3)
CULTURE RESULTS: SIGNIFICANT CHANGE UP
CULTURE RESULTS: SIGNIFICANT CHANGE UP
EOSINOPHIL # BLD AUTO: 0.3 K/UL — SIGNIFICANT CHANGE UP (ref 0–0.5)
EOSINOPHIL NFR BLD AUTO: 1.9 % — SIGNIFICANT CHANGE UP (ref 0–5)
GLUCOSE SERPL-MCNC: 125 MG/DL — HIGH (ref 70–115)
GLUCOSE SERPL-MCNC: 98 MG/DL — SIGNIFICANT CHANGE UP (ref 70–115)
HCT VFR BLD CALC: 28.2 % — LOW (ref 42–52)
HGB BLD-MCNC: 9.1 G/DL — LOW (ref 14–18)
LYMPHOCYTES # BLD AUTO: 1.1 K/UL — SIGNIFICANT CHANGE UP (ref 1–4.8)
LYMPHOCYTES # BLD AUTO: 7.7 % — LOW (ref 20–55)
MAGNESIUM SERPL-MCNC: 2 MG/DL — SIGNIFICANT CHANGE UP (ref 1.6–2.6)
MAGNESIUM SERPL-MCNC: 2.4 MG/DL — SIGNIFICANT CHANGE UP (ref 1.6–2.6)
MCHC RBC-ENTMCNC: 31.6 PG — HIGH (ref 27–31)
MCHC RBC-ENTMCNC: 32.3 G/DL — SIGNIFICANT CHANGE UP (ref 32–36)
MCV RBC AUTO: 97.9 FL — HIGH (ref 80–94)
METHOD TYPE: SIGNIFICANT CHANGE UP
METHOD TYPE: SIGNIFICANT CHANGE UP
MONOCYTES # BLD AUTO: 1 K/UL — HIGH (ref 0–0.8)
MONOCYTES NFR BLD AUTO: 6.9 % — SIGNIFICANT CHANGE UP (ref 3–10)
NEUTROPHILS # BLD AUTO: 11.8 K/UL — HIGH (ref 1.8–8)
NEUTROPHILS NFR BLD AUTO: 83 % — HIGH (ref 37–73)
ORGANISM # SPEC MICROSCOPIC CNT: SIGNIFICANT CHANGE UP
PHOSPHATE SERPL-MCNC: 2.6 MG/DL — SIGNIFICANT CHANGE UP (ref 2.4–4.7)
PHOSPHATE SERPL-MCNC: 2.6 MG/DL — SIGNIFICANT CHANGE UP (ref 2.4–4.7)
PLATELET # BLD AUTO: 280 K/UL — SIGNIFICANT CHANGE UP (ref 150–400)
POTASSIUM SERPL-MCNC: 2.9 MMOL/L — CRITICAL LOW (ref 3.5–5.3)
POTASSIUM SERPL-MCNC: 3.4 MMOL/L — LOW (ref 3.5–5.3)
POTASSIUM SERPL-SCNC: 2.9 MMOL/L — CRITICAL LOW (ref 3.5–5.3)
POTASSIUM SERPL-SCNC: 3.4 MMOL/L — LOW (ref 3.5–5.3)
RBC # BLD: 2.88 M/UL — LOW (ref 4.6–6.2)
RBC # FLD: 14.3 % — SIGNIFICANT CHANGE UP (ref 11–15.6)
SODIUM SERPL-SCNC: 141 MMOL/L — SIGNIFICANT CHANGE UP (ref 135–145)
SODIUM SERPL-SCNC: 142 MMOL/L — SIGNIFICANT CHANGE UP (ref 135–145)
SPECIMEN SOURCE: SIGNIFICANT CHANGE UP
SPECIMEN SOURCE: SIGNIFICANT CHANGE UP
WBC # BLD: 14.2 K/UL — HIGH (ref 4.8–10.8)
WBC # FLD AUTO: 14.2 K/UL — HIGH (ref 4.8–10.8)

## 2018-05-30 PROCEDURE — 99291 CRITICAL CARE FIRST HOUR: CPT

## 2018-05-30 PROCEDURE — 99233 SBSQ HOSP IP/OBS HIGH 50: CPT

## 2018-05-30 PROCEDURE — 93971 EXTREMITY STUDY: CPT | Mod: 26,LT

## 2018-05-30 RX ORDER — POTASSIUM PHOSPHATE, MONOBASIC POTASSIUM PHOSPHATE, DIBASIC 236; 224 MG/ML; MG/ML
15 INJECTION, SOLUTION INTRAVENOUS ONCE
Qty: 0 | Refills: 0 | Status: COMPLETED | OUTPATIENT
Start: 2018-05-30 | End: 2018-05-30

## 2018-05-30 RX ORDER — POTASSIUM CHLORIDE 20 MEQ
10 PACKET (EA) ORAL ONCE
Qty: 0 | Refills: 0 | Status: COMPLETED | OUTPATIENT
Start: 2018-05-30 | End: 2018-05-30

## 2018-05-30 RX ORDER — POTASSIUM CHLORIDE 20 MEQ
10 PACKET (EA) ORAL
Qty: 0 | Refills: 0 | Status: COMPLETED | OUTPATIENT
Start: 2018-05-30 | End: 2018-05-30

## 2018-05-30 RX ORDER — MAGNESIUM SULFATE 500 MG/ML
2 VIAL (ML) INJECTION ONCE
Qty: 0 | Refills: 0 | Status: COMPLETED | OUTPATIENT
Start: 2018-05-30 | End: 2018-05-30

## 2018-05-30 RX ADMIN — Medication 100 MILLIEQUIVALENT(S): at 21:40

## 2018-05-30 RX ADMIN — LISINOPRIL 5 MILLIGRAM(S): 2.5 TABLET ORAL at 06:16

## 2018-05-30 RX ADMIN — Medication 650 MILLIGRAM(S): at 15:35

## 2018-05-30 RX ADMIN — HEPARIN SODIUM 3500 UNIT(S): 5000 INJECTION INTRAVENOUS; SUBCUTANEOUS at 19:05

## 2018-05-30 RX ADMIN — Medication 650 MILLIGRAM(S): at 22:03

## 2018-05-30 RX ADMIN — Medication 100 MILLIGRAM(S): at 21:40

## 2018-05-30 RX ADMIN — Medication 650 MILLIGRAM(S): at 10:08

## 2018-05-30 RX ADMIN — TAMSULOSIN HYDROCHLORIDE 0.4 MILLIGRAM(S): 0.4 CAPSULE ORAL at 21:40

## 2018-05-30 RX ADMIN — PIPERACILLIN AND TAZOBACTAM 25 GRAM(S): 4; .5 INJECTION, POWDER, LYOPHILIZED, FOR SOLUTION INTRAVENOUS at 12:26

## 2018-05-30 RX ADMIN — GABAPENTIN 300 MILLIGRAM(S): 400 CAPSULE ORAL at 06:16

## 2018-05-30 RX ADMIN — POTASSIUM PHOSPHATE, MONOBASIC POTASSIUM PHOSPHATE, DIBASIC 62.5 MILLIMOLE(S): 236; 224 INJECTION, SOLUTION INTRAVENOUS at 19:06

## 2018-05-30 RX ADMIN — Medication 650 MILLIGRAM(S): at 06:30

## 2018-05-30 RX ADMIN — SODIUM CHLORIDE 100 MILLILITER(S): 9 INJECTION, SOLUTION INTRAVENOUS at 06:17

## 2018-05-30 RX ADMIN — Medication 100 MILLIEQUIVALENT(S): at 22:41

## 2018-05-30 RX ADMIN — GABAPENTIN 300 MILLIGRAM(S): 400 CAPSULE ORAL at 13:33

## 2018-05-30 RX ADMIN — Medication 650 MILLIGRAM(S): at 05:04

## 2018-05-30 RX ADMIN — SENNA PLUS 2 TABLET(S): 8.6 TABLET ORAL at 21:39

## 2018-05-30 RX ADMIN — DONEPEZIL HYDROCHLORIDE 10 MILLIGRAM(S): 10 TABLET, FILM COATED ORAL at 21:40

## 2018-05-30 RX ADMIN — Medication 650 MILLIGRAM(S): at 16:35

## 2018-05-30 RX ADMIN — ATORVASTATIN CALCIUM 10 MILLIGRAM(S): 80 TABLET, FILM COATED ORAL at 21:39

## 2018-05-30 RX ADMIN — Medication 100 MILLIGRAM(S): at 13:33

## 2018-05-30 RX ADMIN — Medication 81 MILLIGRAM(S): at 13:33

## 2018-05-30 RX ADMIN — PIPERACILLIN AND TAZOBACTAM 25 GRAM(S): 4; .5 INJECTION, POWDER, LYOPHILIZED, FOR SOLUTION INTRAVENOUS at 21:39

## 2018-05-30 RX ADMIN — POTASSIUM PHOSPHATE, MONOBASIC POTASSIUM PHOSPHATE, DIBASIC 62.5 MILLIMOLE(S): 236; 224 INJECTION, SOLUTION INTRAVENOUS at 09:06

## 2018-05-30 RX ADMIN — Medication 5 MILLIGRAM(S): at 21:39

## 2018-05-30 RX ADMIN — Medication 100 MILLIEQUIVALENT(S): at 10:08

## 2018-05-30 RX ADMIN — PANTOPRAZOLE SODIUM 40 MILLIGRAM(S): 20 TABLET, DELAYED RELEASE ORAL at 06:16

## 2018-05-30 RX ADMIN — Medication 100 MILLIEQUIVALENT(S): at 19:06

## 2018-05-30 RX ADMIN — Medication 650 MILLIGRAM(S): at 21:40

## 2018-05-30 RX ADMIN — GABAPENTIN 300 MILLIGRAM(S): 400 CAPSULE ORAL at 21:41

## 2018-05-30 RX ADMIN — PIPERACILLIN AND TAZOBACTAM 25 GRAM(S): 4; .5 INJECTION, POWDER, LYOPHILIZED, FOR SOLUTION INTRAVENOUS at 05:04

## 2018-05-30 RX ADMIN — Medication 650 MILLIGRAM(S): at 09:08

## 2018-05-30 RX ADMIN — Medication 50 GRAM(S): at 09:06

## 2018-05-30 NOTE — PROGRESS NOTE ADULT - ASSESSMENT
89 y/o M with tachycardia, tachypnea, fever and hypoxia. CT reporting PE and blood cultures with GNR

## 2018-05-30 NOTE — PROGRESS NOTE ADULT - SUBJECTIVE AND OBJECTIVE BOX
Patient is a 88y old  Male who presents with a chief complaint of cold L leg (25 May 2018 15:12)    Pt is S/P  L AKA  Was rapid response yesterday for possible sepsis and AMS  CT head/abd/chest performed  Pt noted with +PE  Transferred to ICu and started on Heparin gtt  Overnight, improved mental status and is afebrile                                     PAST MEDICAL & SURGICAL HISTORY:  DVT (deep venous thrombosis)  Cataract of both eyes  Prostate CA  AAA (abdominal aortic aneurysm)  Alzheimer disease  Hypertension  DVT (deep venous thrombosis)  AAA (abdominal aortic aneurysm): 5cm  Right iliac aneurysm 4cm  Alzheimer disease  Cataract  Prostate ca  Gallstones  History of cataract surgery  H/O prostatectomy  History of cholecystectomy  H/O prostatectomy: 1998  Status post cataract surgery: bilateral  History of cholecystectomy: 1997    MEDICATIONS  (STANDING):  acetaminophen   Tablet. 650 milliGRAM(s) Oral every 6 hours  aspirin  chewable 81 milliGRAM(s) Oral daily  atorvastatin 10 milliGRAM(s) Oral at bedtime  docusate sodium 100 milliGRAM(s) Oral three times a day  donepezil 10 milliGRAM(s) Oral at bedtime  gabapentin 300 milliGRAM(s) Oral three times a day  heparin  Infusion.  Unit(s)/Hr (16 mL/Hr) IV Continuous <Continuous>  lisinopril 5 milliGRAM(s) Oral daily  melatonin 5 milliGRAM(s) Oral at bedtime  pantoprazole    Tablet 40 milliGRAM(s) Oral before breakfast  piperacillin/tazobactam IVPB. 3.375 Gram(s) IV Intermittent every 8 hours  senna 2 Tablet(s) Oral at bedtime  tamsulosin 0.4 milliGRAM(s) Oral at bedtime    MEDICATIONS  (PRN):  bisacodyl Suppository 10 milliGRAM(s) Rectal daily PRN Constipation  heparin  Injectable 7000 Unit(s) IV Push every 6 hours PRN For aPTT less than 40  heparin  Injectable 3500 Unit(s) IV Push every 6 hours PRN For aPTT between 40 - 57    Vital Signs Last 24 Hrs  T(C): 36.9 (30 May 2018 08:00), Max: 38 (29 May 2018 20:00)  T(F): 98.5 (30 May 2018 08:00), Max: 100.4 (29 May 2018 20:00)  HR: 66 (30 May 2018 10:00) (60 - 92)  BP: 125/59 (30 May 2018 10:00) (101/50 - 140/61)  BP(mean): 89 (30 May 2018 07:00) (72 - 91)  RR: 20 (30 May 2018 10:00) (14 - 21)  SpO2: 97% (30 May 2018 10:00) (97% - 100%)  I&O's Detail    29 May 2018 07:01  -  30 May 2018 07:00  --------------------------------------------------------  IN:    heparin  Infusion.: 171 mL    multiple electrolytes Injection Type 1multiple electrolytes Injection Type 1: 1800 mL    Solution: 150 mL  Total IN: 2121 mL    OUT:    Indwelling Catheter - Urethral: 1050 mL  Total OUT: 1050 mL    Total NET: 1071 mL      30 May 2018 07:01  -  30 May 2018 11:57  --------------------------------------------------------  IN:    heparin  Infusion.: 50 mL    multiple electrolytes Injection Type 1multiple electrolytes Injection Type 1: 400 mL    Solution: 250 mL    Solution: 50 mL    Solution: 100 mL  Total IN: 850 mL    OUT:    Indwelling Catheter - Urethral: 112 mL  Total OUT: 112 mL    Total NET: 738 mL    Physical Exam:  General: Awake, alert. Interactive. NAD  Pulmonary: Nonlabored breathing, CTA diminished at bases  Cardiovascular: Normal S1, S2  Abdominal: softly distended, NT, + BS  Extremities: L AKA incision with staples intact. No erythema or drainage    LABS:                        9.1    14.2  )-----------( 280      ( 30 May 2018 05:22 )             28.2     05-30    142  |  104  |  15.0  ----------------------------<  98  2.9<LL>   |  25.0  |  1.07    Ca    7.6<L>      30 May 2018 05:22  Phos  2.6     05-30  Mg     2.0     05-30    TPro  5.5<L>  /  Alb  2.6<L>  /  TBili  0.9  /  DBili  x   /  AST  42<H>  /  ALT  43<H>  /  AlkPhos  103  05-29  PTT - ( 30 May 2018 05:22 )  PTT:186.2 sec    CAPILLARY BLOOD GLUCOSE  POCT Blood Glucose.: 123 mg/dL (29 May 2018 13:27)    RECENT CULTURES:  05-29 .Urine Catheterized XXXX XXXX   >100,000 CFU/ml Gram Negative Rods  Culture in progress    05-29 .Blood Blood XXXX XXXX   Growth in anaerobic bottle: Gram Negative Rods  Anaerobic Bottle: 9.35 Hours to positivity  Aerobic Bottle: 11.06 Hours to positivity  .  TYPE: (C=Critical, N=Notification, A=Abnormal) c  TESTS:  _ bld gs  DATE/TIME CALLED: _ 05/29/2018 18:21:21  CALLED TO: Drew gonzalez  READ BACK (2 Patient Identifiers)(Y/N): _ y  READ BACK VALUES (Y/N): _ y  CALLED BY: Drew cohn    05-29 .Blood Blood Blood Culture PCR XXXX   Growth in anaerobic bottle: Gram Negative Rods  Anaerobic Bottle: 9.16 Hours to positivity  Aerobic Bottle: 11.16 Hours to positivity  ***Blood Panel PCR results on this specimen are available  approximately 3 hours after the Gram stain result.***  Gram stain, PCR, and/or culture results may not always  correspond due to difference in methodologies.  ************************************************************  This PCR assay was performed using [employer].  The following targets are tested for: Enterococcus,  vancomycin resistant enterococci, Listeria monocytogenes,  coagulase negative staphylococci, S. aureus,  methicillin resistant S. aureus, Streptococcus agalactiae  (Group B), S. pneumoniae, S. pyogenes (Group A),  Acinetobacterbaumannii, Enterobacter cloacae, E. coli,  Klebsiella oxytoca, K. pneumoniae, Proteus sp.,  Serratia marcescens, Haemophilus influenzae,  Neisseria meningitidis, Pseudomonas aeruginosa, Candida  albicans, C. glabrata, C krusei, C parapsilosis,  C. tropicalis and the KPC resistance gene.  "Due to technical problems, Proteus sp. will Not be reported as part of  the BCID panel until further notice"  .  TYPE: (C=Critical, N=Notification, A=Abnormal) c  TESTS:  _ bld gs  DATE/TIME CALLED: _ 05/29/201818:23:41  CALLED TO: Drew gonzalez  READ BACK (2 Patient Identifiers)(Y/N): _ y  READ BACK VALUES (Y/N): _ y  CALLED BY: Drew cohn    Radiology and Additional Studies:    EXAM:  CT ABDOMEN AND PELVIS IC                         EXAM:  CT ANGIO CHEST (W)AW IC                          PROCEDURE DATE:  05/29/2018          INTERPRETATION:  HISTORY:  Rapid response. Evaluate for pulmonary emboli   or aortic dissection. Patient also has hypoxia and fever. .    Date/Time of exam: 5/29/2018 2:06 PM    TECHNIQUE:  Sections were obtained from the lung apices to the symphysis   pubis following  intravenous contrast.   The patient was given 120 cc of   omnipaque 350. MIP images were obtained and reviewed.    COMPARISON EXAMINATION:     No prior exam.    FINDINGS:    A small pulmonary embolus is noted in the right middle lobe medial   segmental artery. In addition a segmental pulmonary embolus is noted in   the right upper lobe. Mild respiratory motion artifact precludes   evaluation of lower lobe segmental pulmonary arteries. No evidence of   right heart strain. The RV/LV ratio is less than 1.0.    No evidence of mediastinal or hilar lymphadenopathy. There is a small   right pleural effusion. No evidence of a left pleural effusion. No   evidence of a pericardial effusion. No evidence of axillary adenopathy.    There are atherosclerotic changes in the abdominal aorta and thoracic   aorta without evidence of dissection. There is a 4.8 cm aneurysm of the   abdominal aorta. No evidence of rupture. No evidence of periaortic   hematoma. 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 There is a left common iliac artery aneurysm measuring 3.4 cm.    The left lung is clear. Mild atelectasis at the right lung base.    The liver is unremarkable. Status post cholecystectomy. The spleen is not   enlarged and demonstrates no focal abnormality. The pancreatic contour is   unremarkable without evidence of mass, inflammation or ductal dilatation.   The adrenal glands demonstrate normal size and contour.    The kidneys reveal a normal enhanced morphology.    The celiac artery and superior mesenteric artery are patent. There is a   single renalartery on each side, patent. The inferior mesenteric artery   is obstructed at its origin but reconstitutes after several CM. This   finding is unchanged since 5/20/2018..    No evidence of retroperitoneal lymphadenopathy or hematoma. No evidence   of pelvic lymphadenopathy or hematoma. A small amount of air is noted in   the bladder in keeping with recent instrumentation.    Status post prostatectomy.    Mild diverticulosis of the colon. No evidence of diverticulitis or   colitis.    Degenerative changes in the spine.        IMPRESSION:     Exam positive for small pulmonary emboli in the right upper lobe and   right middle lobe. No evidence of right heart strain.    Small right pleural effusion and right basilar atelectasis.    No evidence of aortic dissection. Abdominal aortic aneurysm as well as   bilateral common iliac artery aneurysms.    Diverticulosis without evidence of diverticulitis.        Assessment:88yMaleHPI:  88 year old male w/PMH of Alzheimers, prostate ca s/p prostatectomy transferred here from [place] with pulselessness, pain and numbness to [facility] that developed around 9pm . Patients family reports a recent diagnosis of DVT to [facility] and started Lovenox / coumadin therapy about 10 days ago with a recent INR of 2.3 a few days ago, however INR - 4.2 on repeat labs here . Lactate uptrending from 2.6 to 4.7 . Pt comes in on cardene gtt for SBP > 200 .     S/P L AKA  Was rapid response yesterday for possible sepsis and AMS  CT head/abd/chest performed  Pt noted with +PE  Transferred to ICu and started on Heparin gtt  +BC with g- rods  Urine with g- rods    Plan:  Cont AC with Heparin  Cont ASA and Statin  Cont ABX  OOB/Ambulate/PT  DM management  DC parra if possible

## 2018-05-30 NOTE — PROGRESS NOTE ADULT - SUBJECTIVE AND OBJECTIVE BOX
[facility]  INFECTIOUS DISEASES AND INTERNAL MEDICINE at [place]  =======================================================  Sina Del Valle MD  Diplomates American Board of Internal Medicine and Infectious Diseases  =======================================================    SOSA PERRY [id number]    Follow up: Sepsis    No complaints  cannot recall events of yesterday    Allergies:  No Known Allergies      Antibiotics:  piperacillin/tazobactam IVPB. 3.375 Gram(s) IV Intermittent every 8 hours      REVIEW OF SYSTEMS:  CONSTITUTIONAL:  No Fever or chills  HEENT:   No diplopia or blurred vision.  No earache, sore throat or runny nose.  CARDIOVASCULAR:  No pressure, squeezing, strangling, tightness, heaviness or aching about the chest, neck, axilla or epigastrium.  RESPIRATORY:  No cough, shortness of breath  GASTROINTESTINAL:  No nausea, vomiting or diarrhea.  GENITOURINARY:  No flank pain  MUSCULOSKELETAL:  no joint aches, no muscle pain  SKIN:  No change in skin, hair or nails.  NEUROLOGIC:  No paresthesias, fasciculations, seizures or weakness.  PSYCHIATRIC:  No disorder of thought or mood.  ENDOCRINE:  No heat or cold intolerance  HEMATOLOGICAL:  No easy bruising or bleeding.       Physical Exam:  Vital Signs Last 24 Hrs  T(C): 37.3 (30 May 2018 04:00), Max: 38 (29 May 2018 20:00)  T(F): 99.2 (30 May 2018 04:00), Max: 100.4 (29 May 2018 20:00)  HR: 70 (30 May 2018 08:00) (11 - 92)  BP: 133/60 (30 May 2018 08:00) (101/50 - 155/77)  BP(mean): 89 (30 May 2018 07:00) (72 - 91)  RR: 20 (30 May 2018 08:00) (14 - 21)  SpO2: 100% (30 May 2018 08:00) (97% - 100%)      GEN: NAD, pleasant  HEENT: normocephalic and atraumatic. EOMI. PERRL.    NECK: Supple.   LUNGS: Clear to auscultation.  HEART: Regular rate and rhythm  ABDOMEN: Soft, nontender, and nondistended.  Positive bowel sounds.    : No CVA tenderness  EXTREMITIES: Left AKA  MSK: no joint swelling  NEUROLOGIC: Awake, alert  PSYCHIATRIC: Appropriate affect .  SKIN: No Rash      Labs:  05-30    142  |  104  |  15.0  ----------------------------<  98  2.9<LL>   |  25.0  |  1.07    Ca    7.6<L>      30 May 2018 05:22  Phos  2.6     05-30  Mg     2.0     05-30    TPro  5.5<L>  /  Alb  2.6<L>  /  TBili  0.9  /  DBili  x   /  AST  42<H>  /  ALT  43<H>  /  AlkPhos  103  05-29               9.1    14.2  )-----------( 280      ( 30 May 2018 05:22 )             28.2       PTT - ( 30 May 2018 05:22 )  PTT:186.2 sec    LIVER FUNCTIONS - ( 29 May 2018 09:55 )  Alb: 2.6 g/dL / Pro: 5.5 g/dL / ALK PHOS: 103 U/L / ALT: 43 U/L / AST: 42 U/L / GGT: x             ABG - ( 29 May 2018 13:40 )  pH, Arterial: 7.48  pH, Blood: x     /  pCO2: 34    /  pO2: 71    / HCO3: 26    / Base Excess: 2.1   /  SaO2: 96          RECENT CULTURES:  05-29 @ 08:32 .Blood Blood     Growth in anaerobic bottle: Gram Negative Rods  Anaerobic Bottle: 9.35 Hours to positivity  Aerobic Bottle: 11.06 Hours to positivity      05-29 @ 08:31 .Blood Blood Blood Culture PCR    Growth in anaerobic bottle: Gram Negative Rods  Anaerobic Bottle: 9.16 Hours to positivity  Aerobic Bottle: 11.16 Hours to positivity  ***Blood Panel PCR results on this specimen are available  approximately 3 hours after the Gram stain result.***  Gram stain, PCR, and/or culture results may not always  correspond due to difference in methodologies.  ************************************************************  This PCR assay was performed using [employer].  The following targets are tested for: Enterococcus,  vancomycin resistant enterococci, Listeria monocytogenes,  coagulase negative staphylococci, S. aureus,  methicillin resistant S. aureus, Streptococcus agalactiae  (Group B), S. pneumoniae, S. pyogenes (Group A),  Acinetobacterbaumannii, Enterobacter cloacae, E. coli,  Klebsiella oxytoca, K. pneumoniae, Proteus sp.,  Serratia marcescens, Haemophilus influenzae,  Neisseria meningitidis, Pseudomonas aeruginosa, Candida  albicans, C. glabrata, C krusei, C parapsilosis,  C. tropicalis and the KPC resistance gene.  "Due to technical problems, Proteus sp. will Not be reported as part of  the BCID panel until further notice"          EXAM:  CT ABDOMEN AND PELVIS IC                        EXAM:  CT ANGIO CHEST (W)AW IC                        PROCEDURE DATE:  05/29/2018    INTERPRETATION:  HISTORY:  Rapid response. Evaluate for pulmonary emboli   or aortic dissection. Patient also has hypoxia and fever. .  Date/Time of exam: 5/29/2018 2:06 PM  TECHNIQUE:  Sections were obtained from the lung apices to the symphysis   pubis following  intravenous contrast.   The patient was given 120 cc of   omnipaque 350. MIP images were obtained and reviewed.  COMPARISON EXAMINATION:     No prior exam.  FINDINGS:    A small pulmonary embolus is noted in the right middle lobe medial   segmental artery. In addition a segmental pulmonary embolus is noted in   the right upper lobe. Mild respiratory motion artifact precludes   evaluation of lower lobe segmental pulmonary arteries. No evidence of   right heart strain. The RV/LV ratio is less than 1.0.  No evidence of mediastinal or hilar lymphadenopathy. There is a small   right pleural effusion. No evidence of a left pleural effusion. No   evidence of a pericardial effusion. No evidence of axillary adenopathy.  There are atherosclerotic changes in the abdominal aorta and thoracic   aorta without evidence of dissection. There is a 4.8 cm aneurysm of the   abdominal aorta. No evidence of rupture. No evidence of periaortic   hematoma. 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 There is a left common iliac artery aneurysm measuring 3.4 cm.  The left lung is clear. Mild atelectasis at the right lung base.  The liver is unremarkable. Status post cholecystectomy. The spleen is not   enlarged and demonstrates no focal abnormality. The pancreatic contour is   unremarkable without evidence of mass, inflammation or ductal dilatation.   The adrenal glands demonstrate normal size and contour.  The kidneys reveal a normal enhanced morphology.  The celiac artery and superior mesenteric artery are patent. There is a   single renal artery on each side, patent. The inferior mesenteric artery   is obstructed at its origin but reconstitutes after several CM. This   finding is unchanged since 5/20/2018..  No evidence of retroperitoneal lymphadenopathy or hematoma. No evidence   of pelvic lymphadenopathy or hematoma. A small amount of air is noted in   the bladder in keeping with recent instrumentation.  Status post prostatectomy.  Mild diverticulosis of the colon. No evidence of diverticulitis or colitis.  Degenerative changes in the spine.  IMPRESSION:   Exam positive for small pulmonary emboli in the right upper lobe and   right middle lobe. No evidence of right heart strain.  Small right pleural effusion and right basilar atelectasis.  No evidence of aortic dissection. Abdominal aortic aneurysm as well as   bilateral common iliac artery aneurysms.  Diverticulosis without evidence of diverticulitis. [facility]  INFECTIOUS DISEASES AND INTERNAL MEDICINE at [place]  =======================================================  Sina Del Valle MD  Diplomates American Board of Internal Medicine and Infectious Diseases  =======================================================    SOSA PERRY [id number]    Follow up: Sepsis    No complaints  cannot recall events of yesterday    Allergies:  No Known Allergies      Antibiotics:  piperacillin/tazobactam IVPB. 3.375 Gram(s) IV Intermittent every 8 hours      REVIEW OF SYSTEMS:  CONSTITUTIONAL:  No Fever or chills  HEENT:   No diplopia or blurred vision.  No earache, sore throat or runny nose.  CARDIOVASCULAR:  No pressure, squeezing, strangling, tightness, heaviness or aching about the chest, neck, axilla or epigastrium.  RESPIRATORY:  No cough, shortness of breath  GASTROINTESTINAL:  No nausea, vomiting or diarrhea.  GENITOURINARY:  No flank pain  MUSCULOSKELETAL:  no joint aches, no muscle pain  SKIN:  No change in skin, hair or nails.  NEUROLOGIC:  No paresthesias, fasciculations  PSYCHIATRIC:  No disorder of thought or mood.  ENDOCRINE:  No heat or cold intolerance  HEMATOLOGICAL:  No easy bruising or bleeding.       Physical Exam:  Vital Signs Last 24 Hrs  T(C): 37.3 (30 May 2018 04:00), Max: 38 (29 May 2018 20:00)  T(F): 99.2 (30 May 2018 04:00), Max: 100.4 (29 May 2018 20:00)  HR: 70 (30 May 2018 08:00) (11 - 92)  BP: 133/60 (30 May 2018 08:00) (101/50 - 155/77)  BP(mean): 89 (30 May 2018 07:00) (72 - 91)  RR: 20 (30 May 2018 08:00) (14 - 21)  SpO2: 100% (30 May 2018 08:00) (97% - 100%)      GEN: NAD, pleasant  HEENT: normocephalic and atraumatic. EOMI. PERRL.    NECK: Supple.   LUNGS: Clear to auscultation.  HEART: Regular rate and rhythm  ABDOMEN: Soft, nontender, and nondistended.  Positive bowel sounds.    : No CVA tenderness  EXTREMITIES: Left AKA  MSK: no joint swelling  NEUROLOGIC: Awake, alert  PSYCHIATRIC: Appropriate affect .  SKIN: No Rash      Labs:  05-30    142  |  104  |  15.0  ----------------------------<  98  2.9<LL>   |  25.0  |  1.07    Ca    7.6<L>      30 May 2018 05:22  Phos  2.6     05-30  Mg     2.0     05-30    TPro  5.5<L>  /  Alb  2.6<L>  /  TBili  0.9  /  DBili  x   /  AST  42<H>  /  ALT  43<H>  /  AlkPhos  103  05-29               9.1    14.2  )-----------( 280      ( 30 May 2018 05:22 )             28.2       PTT - ( 30 May 2018 05:22 )  PTT:186.2 sec    LIVER FUNCTIONS - ( 29 May 2018 09:55 )  Alb: 2.6 g/dL / Pro: 5.5 g/dL / ALK PHOS: 103 U/L / ALT: 43 U/L / AST: 42 U/L / GGT: x             ABG - ( 29 May 2018 13:40 )  pH, Arterial: 7.48  pH, Blood: x     /  pCO2: 34    /  pO2: 71    / HCO3: 26    / Base Excess: 2.1   /  SaO2: 96          RECENT CULTURES:  05-29 @ 08:32 .Blood Blood     Growth in anaerobic bottle: Gram Negative Rods  Anaerobic Bottle: 9.35 Hours to positivity  Aerobic Bottle: 11.06 Hours to positivity      05-29 @ 08:31 .Blood Blood Blood Culture PCR    Growth in anaerobic bottle: Gram Negative Rods  Anaerobic Bottle: 9.16 Hours to positivity  Aerobic Bottle: 11.16 Hours to positivity  ***Blood Panel PCR results on this specimen are available  approximately 3 hours after the Gram stain result.***  Gram stain, PCR, and/or culture results may not always  correspond due to difference in methodologies.  ************************************************************  This PCR assay was performed using [employer].  The following targets are tested for: Enterococcus,  vancomycin resistant enterococci, Listeria monocytogenes,  coagulase negative staphylococci, S. aureus,  methicillin resistant S. aureus, Streptococcus agalactiae  (Group B), S. pneumoniae, S. pyogenes (Group A),  Acinetobacterbaumannii, Enterobacter cloacae, E. coli,  Klebsiella oxytoca, K. pneumoniae, Proteus sp.,  Serratia marcescens, Haemophilus influenzae,  Neisseria meningitidis, Pseudomonas aeruginosa, Candida  albicans, C. glabrata, C krusei, C parapsilosis,  C. tropicalis and the KPC resistance gene.  "Due to technical problems, Proteus sp. will Not be reported as part of  the BCID panel until further notice"          EXAM:  CT ABDOMEN AND PELVIS IC                        EXAM:  CT ANGIO CHEST (W)AW IC                        PROCEDURE DATE:  05/29/2018    INTERPRETATION:  HISTORY:  Rapid response. Evaluate for pulmonary emboli   or aortic dissection. Patient also has hypoxia and fever. .  Date/Time of exam: 5/29/2018 2:06 PM  TECHNIQUE:  Sections were obtained from the lung apices to the symphysis   pubis following  intravenous contrast.   The patient was given 120 cc of   omnipaque 350. MIP images were obtained and reviewed.  COMPARISON EXAMINATION:     No prior exam.  FINDINGS:    A small pulmonary embolus is noted in the right middle lobe medial   segmental artery. In addition a segmental pulmonary embolus is noted in   the right upper lobe. Mild respiratory motion artifact precludes   evaluation of lower lobe segmental pulmonary arteries. No evidence of   right heart strain. The RV/LV ratio is less than 1.0.  No evidence of mediastinal or hilar lymphadenopathy. There is a small   right pleural effusion. No evidence of a left pleural effusion. No   evidence of a pericardial effusion. No evidence of axillary adenopathy.  There are atherosclerotic changes in the abdominal aorta and thoracic   aorta without evidence of dissection. There is a 4.8 cm aneurysm of the   abdominal aorta. No evidence of rupture. No evidence of periaortic   hematoma. 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 There is a left common iliac artery aneurysm measuring 3.4 cm.  The left lung is clear. Mild atelectasis at the right lung base.  The liver is unremarkable. Status post cholecystectomy. The spleen is not   enlarged and demonstrates no focal abnormality. The pancreatic contour is   unremarkable without evidence of mass, inflammation or ductal dilatation.   The adrenal glands demonstrate normal size and contour.  The kidneys reveal a normal enhanced morphology.  The celiac artery and superior mesenteric artery are patent. There is a   single renal artery on each side, patent. The inferior mesenteric artery   is obstructed at its origin but reconstitutes after several CM. This   finding is unchanged since 5/20/2018..  No evidence of retroperitoneal lymphadenopathy or hematoma. No evidence   of pelvic lymphadenopathy or hematoma. A small amount of air is noted in   the bladder in keeping with recent instrumentation.  Status post prostatectomy.  Mild diverticulosis of the colon. No evidence of diverticulitis or colitis.  Degenerative changes in the spine.  IMPRESSION:   Exam positive for small pulmonary emboli in the right upper lobe and   right middle lobe. No evidence of right heart strain.  Small right pleural effusion and right basilar atelectasis.  No evidence of aortic dissection. Abdominal aortic aneurysm as well as   bilateral common iliac artery aneurysms.  Diverticulosis without evidence of diverticulitis.

## 2018-05-30 NOTE — PROVIDER CONTACT NOTE (CRITICAL VALUE NOTIFICATION) - ACTION/TREATMENT ORDERED:
fluids and trending
fluids given
repeat ptt
Heparin Drip to be held for one hour & then dose dropped to 1000 units/hr

## 2018-05-30 NOTE — PROGRESS NOTE ADULT - PROBLEM SELECTOR PLAN 1
Blood cultures with GNR Blood cultures with GNR  Will repeat blood cultures  CT A/P with no acute findings  Continue Zosyn  Trend fevers  Leukocytosis trending down Blood cultures with GNR  Will repeat blood cultures  CT A/P with no acute findings  Continue Zosyn  Trend fevers  Trend Leukocytosis

## 2018-05-30 NOTE — PROGRESS NOTE ADULT - SUBJECTIVE AND OBJECTIVE BOX
INTERVAL HPI/OVERNIGHT EVENTS: patient resuscitated with crystalloids and started on antibiotics for gram negative bacteremia.     PRESSORS: [ ] YES [x ] NO  WHICH:  DOSE:    ANTIBIOTICS:                  DATE STARTED:  ANTIBIOTICS:                  DATE STARTED:  ANTIBIOTICS:                  DATE STARTED:    MEDICATIONS  (STANDING):  acetaminophen   Tablet. 650 milliGRAM(s) Oral every 6 hours  aspirin  chewable 81 milliGRAM(s) Oral daily  atorvastatin 10 milliGRAM(s) Oral at bedtime  docusate sodium 100 milliGRAM(s) Oral three times a day  donepezil 10 milliGRAM(s) Oral at bedtime  gabapentin 300 milliGRAM(s) Oral three times a day  heparin  Infusion.  Unit(s)/Hr (16 mL/Hr) IV Continuous <Continuous>  lisinopril 5 milliGRAM(s) Oral daily  melatonin 5 milliGRAM(s) Oral at bedtime  multiple electrolytes Injection Type 1 1000 milliLiter(s) (100 mL/Hr) IV Continuous <Continuous>  pantoprazole    Tablet 40 milliGRAM(s) Oral before breakfast  piperacillin/tazobactam IVPB. 3.375 Gram(s) IV Intermittent every 8 hours  senna 2 Tablet(s) Oral at bedtime  tamsulosin 0.4 milliGRAM(s) Oral at bedtime    MEDICATIONS  (PRN):  bisacodyl Suppository 10 milliGRAM(s) Rectal daily PRN Constipation  heparin  Injectable 7000 Unit(s) IV Push every 6 hours PRN For aPTT less than 40  heparin  Injectable 3500 Unit(s) IV Push every 6 hours PRN For aPTT between 40 - 57      Drug Dosing Weight  Height (cm): 180.34 (19 May 2018 23:36)  Weight (kg): 89.8 (19 May 2018 23:36)  BMI (kg/m2): 27.6 (19 May 2018 23:36)  BSA (m2): 2.1 (19 May 2018 23:36)    CENTRAL LINE: [ ] YES [x ] NO  LOCATION:   DATE INSERTED:  REMOVE: [ ] YES [ ] NO  EXPLAIN:    MICHAELS: [ x] YES [ ] NO     Date inserted 5/29:18  REMOVE: [ ] YES [ ] NO  EXPLAIN: to monitor UOP while treatint sepsis      PAST MEDICAL & SURGICAL HISTORY:  DVT (deep venous thrombosis)  Cataract of both eyes  Prostate CA  AAA (abdominal aortic aneurysm)  Alzheimer disease  Hypertension  DVT (deep venous thrombosis)  AAA (abdominal aortic aneurysm): 5cm  Right iliac aneurysm 4cm  Alzheimer disease  Cataract  Prostate ca  Gallstones  History of cataract surgery  H/O prostatectomy  History of cholecystectomy  H/O prostatectomy: 1998  Status post cataract surgery: bilateral  History of cholecystectomy: 1997      ICU Vital Signs Last 24 Hrs  T(C): 36.9 (30 May 2018 08:00), Max: 38 (29 May 2018 20:00)  T(F): 98.5 (30 May 2018 08:00), Max: 100.4 (29 May 2018 20:00)  HR: 66 (30 May 2018 10:00) (60 - 92)  BP: 125/59 (30 May 2018 10:00) (101/50 - 140/61)  BP(mean): 89 (30 May 2018 07:00) (72 - 91)  ABP: --  ABP(mean): --  RR: 20 (30 May 2018 10:00) (14 - 21)  SpO2: 97% (30 May 2018 10:00) (97% - 100%)      ABG - ( 29 May 2018 13:40 )  pH, Arterial: 7.48  pH, Blood: x     /  pCO2: 34    /  pO2: 71    / HCO3: 26    / Base Excess: 2.1   /  SaO2: 96                  I&O's Detail    29 May 2018 07:01  -  30 May 2018 07:00  --------------------------------------------------------  IN:    heparin  Infusion.: 171 mL    multiple electrolytes Injection Type 1: 1800 mL    Solution: 150 mL  Total IN: 2121 mL    OUT:    Indwelling Catheter - Urethral: 1050 mL  Total OUT: 1050 mL    Total NET: 1071 mL      30 May 2018 07:01  -  30 May 2018 11:11  --------------------------------------------------------  IN:    heparin  Infusion.: 40 mL    multiple electrolytes Injection Type 1: 400 mL    Solution: 50 mL    Solution: 100 mL    Solution: 250 mL  Total IN: 840 mL    OUT:    Indwelling Catheter - Urethral: 62 mL  Total OUT: 62 mL    Total NET: 778 mL              Physical Exam:    Neurological:  No sensory/motor deficits. Baseline mentation with pleasant dementia. Follows commands.     HEENT: PERRLA, EOMI, no drainage or redness    Respiratory: Breath Sounds equal & clear to auscultation, no accessory muscle use    Cardiovascular: Regular rate & rhythm, normal S1, S2    Gastrointestinal: Soft, non-tender    Extremities: Pitting edema of RLE, Left AKA site appears clean, intact, no drainage    LABS:  CBC Full  -  ( 30 May 2018 05:22 )  WBC Count : 14.2 K/uL  Hemoglobin : 9.1 g/dL  Hematocrit : 28.2 %  Platelet Count - Automated : 280 K/uL  Mean Cell Volume : 97.9 fl  Mean Cell Hemoglobin : 31.6 pg  Mean Cell Hemoglobin Concentration : 32.3 g/dL  Auto Neutrophil # : 11.8 K/uL  Auto Lymphocyte # : 1.1 K/uL  Auto Monocyte # : 1.0 K/uL  Auto Eosinophil # : 0.3 K/uL  Auto Basophil # : 0.0 K/uL  Auto Neutrophil % : 83.0 %  Auto Lymphocyte % : 7.7 %  Auto Monocyte % : 6.9 %  Auto Eosinophil % : 1.9 %  Auto Basophil % : 0.2 %    05-30    142  |  104  |  15.0  ----------------------------<  98  2.9<LL>   |  25.0  |  1.07    Ca    7.6<L>      30 May 2018 05:22  Phos  2.6     05-30  Mg     2.0     05-30    TPro  5.5<L>  /  Alb  2.6<L>  /  TBili  0.9  /  DBili  x   /  AST  42<H>  /  ALT  43<H>  /  AlkPhos  103  05-29    PTT - ( 30 May 2018 05:22 )  PTT:186.2 sec      Assessment and Plan: 88M with dementia s/p L AKA with readmission to SICU with GNR sepsis and subsegmental PEs.    Neuro: GCS 15. Continue Aricept, gabapentin, melatonin. Limit narcotics and regulate sleep wake cycle to prevent delerium.    HEENT: no issues    CV: Continue hemodynamic monitoring; maintain MAPs >65.  Lactate has cleared. No pressor requirements.     Pulm: Pulmonary toilet. on room air. Maintain O2 saturationg > 95%. Continue hep gett for PEs.    GI/Nutrition: Bowel Regimen. Tolerating diet.     /Renal: monitor UOP. Hypokalemia - replete. Maintain UOP 0.5 cc/kg/hr. Flomax for urinary retention    HEME- hept gtt for DVTs.    ID: GNR bacteremia - continue zosyn. awaiting speciation.     Endo: RISS, adequate glucose control    Dispo: Continue ICU care.    Critical Care time spent: 30 minutes

## 2018-05-30 NOTE — PROGRESS NOTE ADULT - SUBJECTIVE AND OBJECTIVE BOX
Patient transferred to the ICU after found to have a temp.   At last visit patient had no documented fever.   Code sepsis done, WBC was WNL, now elevated to 14.2.   UA + for GNR.  PTT was also supratheraputic above 200.  HypoK+ at 2.9.   Patient started on Zosyn.     CT angio chest with abdomen and pelvis shows:  Exam positive for small pulmonary emboli in the right upper lobe and right middle lobe. No evidence of right heart strain.   Small right pleural effusion and right basilar atelectasis. No evidence of aortic dissection. Abdominal aortic aneurysm as well as  bilateral common iliac artery aneurysms. Diverticulosis without evidence of diverticulitis.    Head CT - no acute findings shown.     Patient is back to his baseline, he is awake and conversive.   Complaining of left leg pain. Slight improvement in memory and ongoing events.     FUNCTIONAL PROGRESS  5/28  Bed Mobility  Bed Mobility Training Symptoms Noted During/After Treatment: increased pain;  fatigue;  shortness of breath  Bed Mobility Training Scooting: maximum assist (25% patient effort);  2 person assist;  bed rails  Bed Mobility Training Sit-to-Supine: maximum assist (25% patient effort);  2 person assist;  bed rails  Bed Mobility Training Supine-to-Sit: maximum assist (25% patient effort);  1 person assist;  bed rails;  HOB elevated  Bed Mobility Training Limitations: decreased ability to use legs for bridging/pushing;  impaired ability to control trunk for mobility;  decreased ability to use arms for pushing/pulling;  pain;  decreased strength    Sit-Stand Transfer Training  Sit-to-Stand Transfer Training Treatment not Performed: unable to perform     REVIEW OF SYSTEMS  Constitutional - +fever  Neurological - +memory loss, +loss of strength  Musculoskeletal - +joint pain, +joint swelling, +muscle pain  Psychiatric - No depression, No anxiety    VITALS  T(C): 36.9 (05-30-18 @ 08:00), Max: 38 (05-29-18 @ 20:00)  HR: 66 (05-30-18 @ 10:00) (60 - 92)  BP: 125/59 (05-30-18 @ 10:00) (101/50 - 140/61)  RR: 20 (05-30-18 @ 10:00) (14 - 21)  SpO2: 97% (05-30-18 @ 10:00) (97% - 100%)  Wt(kg): --    MEDICATIONS   acetaminophen   Tablet. 650 milliGRAM(s) every 6 hours  aspirin  chewable 81 milliGRAM(s) daily  atorvastatin 10 milliGRAM(s) at bedtime  bisacodyl Suppository 10 milliGRAM(s) daily PRN  docusate sodium 100 milliGRAM(s) three times a day  donepezil 10 milliGRAM(s) at bedtime  gabapentin 300 milliGRAM(s) three times a day  heparin  Infusion.  Unit(s)/Hr <Continuous>  heparin  Injectable 7000 Unit(s) every 6 hours PRN  heparin  Injectable 3500 Unit(s) every 6 hours PRN  lisinopril 5 milliGRAM(s) daily  melatonin 5 milliGRAM(s) at bedtime  multiple electrolytes Injection Type 1 1000 milliLiter(s) <Continuous>  pantoprazole    Tablet 40 milliGRAM(s) before breakfast  piperacillin/tazobactam IVPB. 3.375 Gram(s) every 8 hours  senna 2 Tablet(s) at bedtime  tamsulosin 0.4 milliGRAM(s) at bedtime      RECENT LABS/IMAGING  CBC Full  -  ( 30 May 2018 05:22 )  WBC Count : 14.2 K/uL  Hemoglobin : 9.1 g/dL  Hematocrit : 28.2 %  Platelet Count - Automated : 280 K/uL  Mean Cell Volume : 97.9 fl  Mean Cell Hemoglobin : 31.6 pg  Mean Cell Hemoglobin Concentration : 32.3 g/dL  Auto Neutrophil # : 11.8 K/uL  Auto Lymphocyte # : 1.1 K/uL  Auto Monocyte # : 1.0 K/uL  Auto Eosinophil # : 0.3 K/uL  Auto Basophil # : 0.0 K/uL  Auto Neutrophil % : 83.0 %  Auto Lymphocyte % : 7.7 %  Auto Monocyte % : 6.9 %  Auto Eosinophil % : 1.9 %  Auto Basophil % : 0.2 %    05-30    142  |  104  |  15.0  ----------------------------<  98  2.9<LL>   |  25.0  |  1.07    Ca    7.6<L>      30 May 2018 05:22  Phos  2.6     05-30  Mg     2.0     05-30    TPro  5.5<L>  /  Alb  2.6<L>  /  TBili  0.9  /  DBili  x   /  AST  42<H>  /  ALT  43<H>  /  AlkPhos  103  05-29      ----------------------------------------------------------------------------------------  PHYSICAL EXAM  Constitutional - NAD, Comfortable  Extremities - Right AKA, incision - CDI, Staples, Swollen, pain to palpation  Neurologic Exam -                    Cognitive - Awake, alert, oriented to self, hospital, part of situation     Motor -   Focal deficits to the BUE SF - proximal weakness, BLE - right HF 2/5, Left LE HF 1/5    Psychiatric - Mood is stable  ----------------------------------------------------------------------------------------  ASSESSMENT/PLAN  88yMale with functional deficits after left AKA   Pain - Tylenol, Neurontin  PEs - SCDs, Heparin Drip  Sepsis - Zosyn   Rehab - Medically being optimized. Continue to recommend ACUTE inpatient rehabilitation for the functional deficits consisting of 3 hours of therapy/day & 24 hour RN/daily PMR physician for comorbid medical management. Will continue to follow for ongoing rehab needs and recommendations. Patient will be able to tolerate 3 hours a day. Despite cognitive status, patient was independent prior. Do not expect patient to have a prosthesis long term, but short term would need rehab to assist with transfers for  as his primary mode of ambulation.

## 2018-05-31 LAB
-  AMIKACIN: SIGNIFICANT CHANGE UP
-  AMPICILLIN/SULBACTAM: SIGNIFICANT CHANGE UP
-  AMPICILLIN: SIGNIFICANT CHANGE UP
-  AZTREONAM: SIGNIFICANT CHANGE UP
-  CEFAZOLIN: SIGNIFICANT CHANGE UP
-  CEFEPIME: SIGNIFICANT CHANGE UP
-  CEFOXITIN: SIGNIFICANT CHANGE UP
-  CEFTRIAXONE: SIGNIFICANT CHANGE UP
-  CIPROFLOXACIN: SIGNIFICANT CHANGE UP
-  ERTAPENEM: SIGNIFICANT CHANGE UP
-  GENTAMICIN: SIGNIFICANT CHANGE UP
-  LEVOFLOXACIN: SIGNIFICANT CHANGE UP
-  MEROPENEM: SIGNIFICANT CHANGE UP
-  NITROFURANTOIN: SIGNIFICANT CHANGE UP
-  PIPERACILLIN/TAZOBACTAM: SIGNIFICANT CHANGE UP
-  TOBRAMYCIN: SIGNIFICANT CHANGE UP
-  TRIMETHOPRIM/SULFAMETHOXAZOLE: SIGNIFICANT CHANGE UP
ANION GAP SERPL CALC-SCNC: 11 MMOL/L — SIGNIFICANT CHANGE UP (ref 5–17)
APTT BLD: 70.3 SEC — HIGH (ref 27.5–37.4)
APTT BLD: 91.9 SEC — HIGH (ref 27.5–37.4)
BASOPHILS # BLD AUTO: 0 K/UL — SIGNIFICANT CHANGE UP (ref 0–0.2)
BASOPHILS NFR BLD AUTO: 0.2 % — SIGNIFICANT CHANGE UP (ref 0–2)
BUN SERPL-MCNC: 14 MG/DL — SIGNIFICANT CHANGE UP (ref 8–20)
CALCIUM SERPL-MCNC: 7.6 MG/DL — LOW (ref 8.6–10.2)
CHLORIDE SERPL-SCNC: 106 MMOL/L — SIGNIFICANT CHANGE UP (ref 98–107)
CO2 SERPL-SCNC: 23 MMOL/L — SIGNIFICANT CHANGE UP (ref 22–29)
CREAT SERPL-MCNC: 0.87 MG/DL — SIGNIFICANT CHANGE UP (ref 0.5–1.3)
CULTURE RESULTS: SIGNIFICANT CHANGE UP
EOSINOPHIL # BLD AUTO: 0.2 K/UL — SIGNIFICANT CHANGE UP (ref 0–0.5)
EOSINOPHIL NFR BLD AUTO: 2.7 % — SIGNIFICANT CHANGE UP (ref 0–5)
GLUCOSE SERPL-MCNC: 103 MG/DL — SIGNIFICANT CHANGE UP (ref 70–115)
HCT VFR BLD CALC: 27.3 % — LOW (ref 42–52)
HGB BLD-MCNC: 8.9 G/DL — LOW (ref 14–18)
LYMPHOCYTES # BLD AUTO: 1.2 K/UL — SIGNIFICANT CHANGE UP (ref 1–4.8)
LYMPHOCYTES # BLD AUTO: 12.6 % — LOW (ref 20–55)
MAGNESIUM SERPL-MCNC: 2.1 MG/DL — SIGNIFICANT CHANGE UP (ref 1.6–2.6)
MCHC RBC-ENTMCNC: 31.7 PG — HIGH (ref 27–31)
MCHC RBC-ENTMCNC: 32.6 G/DL — SIGNIFICANT CHANGE UP (ref 32–36)
MCV RBC AUTO: 97.2 FL — HIGH (ref 80–94)
METHOD TYPE: SIGNIFICANT CHANGE UP
MONOCYTES # BLD AUTO: 0.7 K/UL — SIGNIFICANT CHANGE UP (ref 0–0.8)
MONOCYTES NFR BLD AUTO: 7.8 % — SIGNIFICANT CHANGE UP (ref 3–10)
NEUTROPHILS # BLD AUTO: 7.1 K/UL — SIGNIFICANT CHANGE UP (ref 1.8–8)
NEUTROPHILS NFR BLD AUTO: 76.3 % — HIGH (ref 37–73)
ORGANISM # SPEC MICROSCOPIC CNT: SIGNIFICANT CHANGE UP
ORGANISM # SPEC MICROSCOPIC CNT: SIGNIFICANT CHANGE UP
PHOSPHATE SERPL-MCNC: 2.4 MG/DL — SIGNIFICANT CHANGE UP (ref 2.4–4.7)
PLATELET # BLD AUTO: 261 K/UL — SIGNIFICANT CHANGE UP (ref 150–400)
POTASSIUM SERPL-MCNC: 3.3 MMOL/L — LOW (ref 3.5–5.3)
POTASSIUM SERPL-SCNC: 3.3 MMOL/L — LOW (ref 3.5–5.3)
RBC # BLD: 2.81 M/UL — LOW (ref 4.6–6.2)
RBC # FLD: 14.1 % — SIGNIFICANT CHANGE UP (ref 11–15.6)
SODIUM SERPL-SCNC: 140 MMOL/L — SIGNIFICANT CHANGE UP (ref 135–145)
SPECIMEN SOURCE: SIGNIFICANT CHANGE UP
WBC # BLD: 9.3 K/UL — SIGNIFICANT CHANGE UP (ref 4.8–10.8)
WBC # FLD AUTO: 9.3 K/UL — SIGNIFICANT CHANGE UP (ref 4.8–10.8)

## 2018-05-31 PROCEDURE — 99232 SBSQ HOSP IP/OBS MODERATE 35: CPT

## 2018-05-31 RX ORDER — POTASSIUM CHLORIDE 20 MEQ
40 PACKET (EA) ORAL EVERY 4 HOURS
Qty: 0 | Refills: 0 | Status: COMPLETED | OUTPATIENT
Start: 2018-05-31 | End: 2018-05-31

## 2018-05-31 RX ORDER — CEFTRIAXONE 500 MG/1
1000 INJECTION, POWDER, FOR SOLUTION INTRAMUSCULAR; INTRAVENOUS EVERY 24 HOURS
Qty: 0 | Refills: 0 | Status: DISCONTINUED | OUTPATIENT
Start: 2018-05-31 | End: 2018-05-31

## 2018-05-31 RX ORDER — CEFTRIAXONE 500 MG/1
1000 INJECTION, POWDER, FOR SOLUTION INTRAMUSCULAR; INTRAVENOUS EVERY 24 HOURS
Qty: 0 | Refills: 0 | Status: DISCONTINUED | OUTPATIENT
Start: 2018-05-31 | End: 2018-06-04

## 2018-05-31 RX ORDER — POTASSIUM PHOSPHATE, MONOBASIC POTASSIUM PHOSPHATE, DIBASIC 236; 224 MG/ML; MG/ML
15 INJECTION, SOLUTION INTRAVENOUS ONCE
Qty: 0 | Refills: 0 | Status: COMPLETED | OUTPATIENT
Start: 2018-05-31 | End: 2018-05-31

## 2018-05-31 RX ADMIN — LISINOPRIL 5 MILLIGRAM(S): 2.5 TABLET ORAL at 05:43

## 2018-05-31 RX ADMIN — Medication 650 MILLIGRAM(S): at 22:01

## 2018-05-31 RX ADMIN — Medication 40 MILLIEQUIVALENT(S): at 06:38

## 2018-05-31 RX ADMIN — GABAPENTIN 300 MILLIGRAM(S): 400 CAPSULE ORAL at 22:01

## 2018-05-31 RX ADMIN — Medication 40 MILLIEQUIVALENT(S): at 10:55

## 2018-05-31 RX ADMIN — Medication 650 MILLIGRAM(S): at 06:37

## 2018-05-31 RX ADMIN — Medication 650 MILLIGRAM(S): at 11:10

## 2018-05-31 RX ADMIN — HEPARIN SODIUM 1600 UNIT(S)/HR: 5000 INJECTION INTRAVENOUS; SUBCUTANEOUS at 05:43

## 2018-05-31 RX ADMIN — Medication 5 MILLIGRAM(S): at 22:01

## 2018-05-31 RX ADMIN — CEFTRIAXONE 1000 MILLIGRAM(S): 500 INJECTION, POWDER, FOR SOLUTION INTRAMUSCULAR; INTRAVENOUS at 13:33

## 2018-05-31 RX ADMIN — PIPERACILLIN AND TAZOBACTAM 25 GRAM(S): 4; .5 INJECTION, POWDER, LYOPHILIZED, FOR SOLUTION INTRAVENOUS at 05:42

## 2018-05-31 RX ADMIN — Medication 100 MILLIGRAM(S): at 05:43

## 2018-05-31 RX ADMIN — PANTOPRAZOLE SODIUM 40 MILLIGRAM(S): 20 TABLET, DELAYED RELEASE ORAL at 05:44

## 2018-05-31 RX ADMIN — POTASSIUM PHOSPHATE, MONOBASIC POTASSIUM PHOSPHATE, DIBASIC 62.5 MILLIMOLE(S): 236; 224 INJECTION, SOLUTION INTRAVENOUS at 06:38

## 2018-05-31 RX ADMIN — ATORVASTATIN CALCIUM 10 MILLIGRAM(S): 80 TABLET, FILM COATED ORAL at 22:00

## 2018-05-31 RX ADMIN — GABAPENTIN 300 MILLIGRAM(S): 400 CAPSULE ORAL at 05:43

## 2018-05-31 RX ADMIN — GABAPENTIN 300 MILLIGRAM(S): 400 CAPSULE ORAL at 13:32

## 2018-05-31 RX ADMIN — TAMSULOSIN HYDROCHLORIDE 0.4 MILLIGRAM(S): 0.4 CAPSULE ORAL at 22:01

## 2018-05-31 RX ADMIN — Medication 650 MILLIGRAM(S): at 10:55

## 2018-05-31 RX ADMIN — Medication 100 MILLIGRAM(S): at 22:00

## 2018-05-31 RX ADMIN — Medication 81 MILLIGRAM(S): at 13:32

## 2018-05-31 RX ADMIN — SENNA PLUS 2 TABLET(S): 8.6 TABLET ORAL at 22:00

## 2018-05-31 RX ADMIN — Medication 100 MILLIGRAM(S): at 13:33

## 2018-05-31 RX ADMIN — Medication 650 MILLIGRAM(S): at 05:42

## 2018-05-31 RX ADMIN — Medication 650 MILLIGRAM(S): at 23:00

## 2018-05-31 RX ADMIN — DONEPEZIL HYDROCHLORIDE 10 MILLIGRAM(S): 10 TABLET, FILM COATED ORAL at 22:01

## 2018-05-31 NOTE — PROGRESS NOTE ADULT - SUBJECTIVE AND OBJECTIVE BOX
Patient is a 88y old  Male who presents with a chief complaint of cold L leg (25 May 2018 15:12)    Pt is S/P  L AKA  Pt noted with +PE on CTA  Transferred to ICu and started on Heparin gtt  Overall improved mental status and is afebrile   Has bacteremia and UTI                                    PAST MEDICAL & SURGICAL HISTORY:  DVT (deep venous thrombosis)  Cataract of both eyes  Prostate CA  AAA (abdominal aortic aneurysm)  Alzheimer disease  Hypertension  DVT (deep venous thrombosis)  AAA (abdominal aortic aneurysm): 5cm  Right iliac aneurysm 4cm  Alzheimer disease  Cataract  Prostate ca  Gallstones  History of cataract surgery  H/O prostatectomy  History of cholecystectomy  H/O prostatectomy: 1998  Status post cataract surgery: bilateral  History of cholecystectomy: 1997    MEDICATIONS  (STANDING):  acetaminophen   Tablet. 650 milliGRAM(s) Oral every 6 hours  aspirin  chewable 81 milliGRAM(s) Oral daily  atorvastatin 10 milliGRAM(s) Oral at bedtime  cefTRIAXone Injectable. 1000 milliGRAM(s) IV Push every 24 hours  docusate sodium 100 milliGRAM(s) Oral three times a day  donepezil 10 milliGRAM(s) Oral at bedtime  gabapentin 300 milliGRAM(s) Oral three times a day  heparin  Infusion.  Unit(s)/Hr (16 mL/Hr) IV Continuous <Continuous>  lisinopril 5 milliGRAM(s) Oral daily  melatonin 5 milliGRAM(s) Oral at bedtime  pantoprazole    Tablet 40 milliGRAM(s) Oral before breakfast  potassium chloride   Powder 40 milliEquivalent(s) Oral every 4 hours  senna 2 Tablet(s) Oral at bedtime  tamsulosin 0.4 milliGRAM(s) Oral at bedtime    MEDICATIONS  (PRN):  bisacodyl Suppository 10 milliGRAM(s) Rectal daily PRN Constipation  heparin  Injectable 7000 Unit(s) IV Push every 6 hours PRN For aPTT less than 40  heparin  Injectable 3500 Unit(s) IV Push every 6 hours PRN For aPTT between 40 - 57    Vital Signs Last 24 Hrs  T(C): 37.1 (31 May 2018 08:00), Max: 37.1 (30 May 2018 20:00)  T(F): 98.8 (31 May 2018 08:00), Max: 98.8 (31 May 2018 04:00)  HR: 77 (31 May 2018 08:00) (65 - 77)  BP: 153/72 (31 May 2018 08:00) (131/64 - 173/71)  BP(mean): 111 (31 May 2018 07:00) (97 - 112)  RR: 20 (31 May 2018 08:00) (16 - 20)  SpO2: 97% (31 May 2018 08:00) (95% - 99%)    Physical Exam:  General: Awake, alert. Interactive. NAD  Pulmonary: Nonlabored breathing, CTA diminished at bases  Cardiovascular: Normal S1, S2  Abdominal: softly distended, NT, + BS  Extremities: L AKA incision with staples intact. No erythema or drainage. + edema    LABS:                        8.9    9.3   )-----------( 261      ( 31 May 2018 05:00 )             27.3   05-31    140  |  106  |  14.0  ----------------------------<  103  3.3<L>   |  23.0  |  0.87    Ca    7.6<L>      31 May 2018 05:00  Phos  2.4     05-31  Mg     2.1     05-31    PTT - ( 31 May 2018 05:00 )  PTT:70.3 sec  RECENT CULTURES:  05-29 .Urine Catheterized Proteus mirabilis XXXX   >100,000 CFU/ml Proteus mirabilis    05-29 .Blood Blood Proteus mirabilis XXXX   Growth in anaerobic bottle: Proteus mirabilis  Anaerobic Bottle: 9.35 Hours to positivity  Aerobic Bottle: 11.06 Hours to positivity  .  TYPE: (C=Critical, N=Notification, A=Abnormal) c  TESTS:  _ bld   DATE/TIME CALLED: _ 05/29/2018 18:21:21  CALLED TO: Drew gonzalez  READ BACK (2 Patient Identifiers)(Y/N): _ y  READ BACK VALUES (Y/N): _ y  CALLED BY: _ suki    05-29 .Blood Blood Proteus mirabilis  Blood Culture PCR XXXX   Growth in anaerobic bottle: Proteus mirabilis  Anaerobic Bottle: 9.16 Hours to positivity  Aerobic Bottle: 11.16 Hours to positivity  ***Blood Panel PCR results on this specimen are available  approximately 3 hours after the Gram stain result.***  Gram stain, PCR, and/or culture results may not always  correspond due to difference in methodologies.  ************************************************************  This PCR assay was performed using [employer].  The following targets are tested for: Enterococcus,  vancomycin resistant enterococci, Listeria monocytogenes,  coagulase negative staphylococci, S. aureus,  methicillin resistant S. aureus, Streptococcus agalactiae  (Group B), S. pneumoniae, S. pyogenes (Group A),  Acinetobacter baumannii, Enterobacter cloacae, E. coli,  Klebsiella oxytoca, K. pneumoniae, Proteus sp.,  Serratia marcescens, Haemophilus influenzae,  Neisseria meningitidis, Pseudomonas aeruginosa, Candida  albicans, C. glabrata, C krusei, C parapsilosis,  C. tropicalis and the KPC resistance gene.  "Due to technical problems, Proteus sp. will Not be reported as part of  the BCID panel until further notice"  .  TYPE: (C=Critical, N=Notification, A=Abnormal) c  TESTS:  _ bld gs  DATE/TIME CALLED: _ 05/29/2018 18:23:41  CALLED TO: Drew gonzalez  READ BACK (2 Patient Identifiers)(Y/N): _ y  READ BACK VALUES (Y/N): _ y  CALLED BY: Drew cohn        Radiology and Additional Studies:    EXAM:  CT ABDOMEN AND PELVIS IC                         EXAM:  CT ANGIO CHEST (W)AW IC                          PROCEDURE DATE:  05/29/2018          INTERPRETATION:  HISTORY:  Rapid response. Evaluate for pulmonary emboli   or aortic dissection. Patient also has hypoxia and fever. .    Date/Time of exam: 5/29/2018 2:06 PM    TECHNIQUE:  Sections were obtained from the lung apices to the symphysis   pubis following  intravenous contrast.   The patient was given 120 cc of   omnipaque 350. MIP images were obtained and reviewed.    COMPARISON EXAMINATION:     No prior exam.    FINDINGS:    A small pulmonary embolus is noted in the right middle lobe medial   segmental artery. In addition a segmental pulmonary embolus is noted in   the right upper lobe. Mild respiratory motion artifact precludes   evaluation of lower lobe segmental pulmonary arteries. No evidence of   right heart strain. The RV/LV ratio is less than 1.0.    No evidence of mediastinal or hilar lymphadenopathy. There is a small   right pleural effusion. No evidence of a left pleural effusion. No   evidence of a pericardial effusion. No evidence of axillary adenopathy.    There are atherosclerotic changes in the abdominal aorta and thoracic   aorta without evidence of dissection. There is a 4.8 cm aneurysm of the   abdominal aorta. No evidence of rupture. No evidence of periaortic   hematoma. 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 There is a left common iliac artery aneurysm measuring 3.4 cm.    The left lung is clear. Mild atelectasis at the right lung base.    The liver is unremarkable. Status post cholecystectomy. The spleen is not   enlarged and demonstrates no focal abnormality. The pancreatic contour is   unremarkable without evidence of mass, inflammation or ductal dilatation.   The adrenal glands demonstrate normal size and contour.    The kidneys reveal a normal enhanced morphology.    The celiac artery and superior mesenteric artery are patent. There is a   single renalartery on each side, patent. The inferior mesenteric artery   is obstructed at its origin but reconstitutes after several CM. This   finding is unchanged since 5/20/2018..    No evidence of retroperitoneal lymphadenopathy or hematoma. No evidence   of pelvic lymphadenopathy or hematoma. A small amount of air is noted in   the bladder in keeping with recent instrumentation.    Status post prostatectomy.    Mild diverticulosis of the colon. No evidence of diverticulitis or   colitis.    Degenerative changes in the spine.        IMPRESSION:     Exam positive for small pulmonary emboli in the right upper lobe and   right middle lobe. No evidence of right heart strain.    Small right pleural effusion and right basilar atelectasis.    No evidence of aortic dissection. Abdominal aortic aneurysm as well as   bilateral common iliac artery aneurysms.    Diverticulosis without evidence of diverticulitis.        Assessment:88yMaleHPI:  88 year old male w/PMH of Alzheimers, prostate ca s/p prostatectomy transferred here from [place] with pulselessness, pain and numbness to [facility] that developed around 9pm . Patients family reports a recent diagnosis of DVT to [facility] and started Lovenox / coumadin therapy about 10 days ago with a recent INR of 2.3 a few days ago, however INR - 4.2 on repeat labs here . Lactate uptrending from 2.6 to 4.7 . Pt comes in on cardene gtt for SBP > 200 .     S/P L AKA  Was rapid response for possible sepsis and AMS  CT head/abd/chest performed-Pt noted with +PE  Transferred to ICu and started on Heparin gtt  +BC with Proteus  Urine with Proteus    Plan:  Cont AC with Heparin  Cont ASA and Statin  Cont ABX as per ID  OOB/Ambulate/PT consult. PM&R eval  DC parra if possible  Downgrade to floor

## 2018-05-31 NOTE — PHYSICAL THERAPY INITIAL EVALUATION ADULT - IMPAIRMENTS FOUND, PT EVAL
posture/aerobic capacity/endurance/gait, locomotion, and balance
posture/gait, locomotion, and balance

## 2018-05-31 NOTE — PROGRESS NOTE ADULT - ASSESSMENT
87 y/o M with tachycardia, tachypnea, fever and hypoxia. CT reporting PE and blood and urine cultures with Proteus mirabilis

## 2018-05-31 NOTE — PROGRESS NOTE ADULT - SUBJECTIVE AND OBJECTIVE BOX
Patient in bed, smiling.   Feels well.   Reports no pain. Although when specified pain to the legs, he reports bilateral foot pain equal in intensity.  Workup shows urine and blood positive for Proteus Transitioned to Ceftriaxone.   WBC now WNL and without fever.     FUNCTIONAL PROGRESS  Last 5/28  PT and OT reordered     REVIEW OF SYSTEMS  Constitutional - No fever,  No fatigue  Neurological - No headaches, +memory loss, +loss of strength, +numbness, No tremors  Skin - No rashes, +lesions   Musculoskeletal - +joint pain, +joint swelling, +muscle pain  Psychiatric - No depression, No anxiety    VITALS  T(C): 37.1 (05-31-18 @ 08:00), Max: 37.1 (05-30-18 @ 20:00)  HR: 77 (05-31-18 @ 08:00) (65 - 77)  BP: 153/72 (05-31-18 @ 08:00) (131/64 - 173/71)  RR: 20 (05-31-18 @ 08:00) (16 - 20)  SpO2: 97% (05-31-18 @ 08:00) (95% - 99%)  Wt(kg): --    MEDICATIONS   acetaminophen   Tablet. 650 milliGRAM(s) every 6 hours  aspirin  chewable 81 milliGRAM(s) daily  atorvastatin 10 milliGRAM(s) at bedtime  bisacodyl Suppository 10 milliGRAM(s) daily PRN  cefTRIAXone Injectable. 1000 milliGRAM(s) every 24 hours  docusate sodium 100 milliGRAM(s) three times a day  donepezil 10 milliGRAM(s) at bedtime  gabapentin 300 milliGRAM(s) three times a day  heparin  Infusion.  Unit(s)/Hr <Continuous>  heparin  Injectable 7000 Unit(s) every 6 hours PRN  heparin  Injectable 3500 Unit(s) every 6 hours PRN  lisinopril 5 milliGRAM(s) daily  melatonin 5 milliGRAM(s) at bedtime  pantoprazole    Tablet 40 milliGRAM(s) before breakfast  senna 2 Tablet(s) at bedtime  tamsulosin 0.4 milliGRAM(s) at bedtime      RECENT LABS/IMAGING  CBC Full  -  ( 31 May 2018 05:00 )  WBC Count : 9.3 K/uL  Hemoglobin : 8.9 g/dL  Hematocrit : 27.3 %  Platelet Count - Automated : 261 K/uL  Mean Cell Volume : 97.2 fl  Mean Cell Hemoglobin : 31.7 pg  Mean Cell Hemoglobin Concentration : 32.6 g/dL  Auto Neutrophil # : 7.1 K/uL  Auto Lymphocyte # : 1.2 K/uL  Auto Monocyte # : 0.7 K/uL  Auto Eosinophil # : 0.2 K/uL  Auto Basophil # : 0.0 K/uL  Auto Neutrophil % : 76.3 %  Auto Lymphocyte % : 12.6 %  Auto Monocyte % : 7.8 %  Auto Eosinophil % : 2.7 %  Auto Basophil % : 0.2 %    05-31    140  |  106  |  14.0  ----------------------------<  103  3.3<L>   |  23.0  |  0.87    Ca    7.6<L>      31 May 2018 05:00  Phos  2.4     05-31  Mg     2.1     05-31        ----------------------------------------------------------------------------------------  PHYSICAL EXAM  Constitutional - NAD, Comfortable  Extremities - Right AKA, incision - CDI, Staples, Swollen, pain to palpation  Neurologic Exam -                    Cognitive - Awake, alert, oriented to self, hospital, part of situation     Motor -   Focal deficits to the BUE SF - proximal weakness, BLE - right HF 2/5, Left LE HF 1/5    Psychiatric - Mood is stable  ----------------------------------------------------------------------------------------  ASSESSMENT/PLAN  88yMale with functional deficits after left AKA   Pain - Tylenol  Phantom pain and sensation - Neurontin 300mg TID	  PEs - SCDs, Heparin Drip  Bacteremia/Urosepsis - Rocephin  Rehab - Medically being optimized. Continue to recommend ACUTE inpatient rehabilitation for the functional deficits consisting of 3 hours of therapy/day & 24 hour RN/daily PMR physician for comorbid medical management. Will continue to follow for ongoing rehab needs and recommendations. Patient will be able to tolerate 3 hours a day. Despite cognitive status, patient was independent prior. Do not expect patient to have a prosthesis long term, but short term would need rehab to assist with transfers for  as his primary mode of ambulation.

## 2018-05-31 NOTE — PHYSICAL THERAPY INITIAL EVALUATION ADULT - LEVEL OF INDEPENDENCE: SUPINE/SIT, REHAB EVAL
moderate assist (50% patients effort)/maximum assist (25% patients effort)
maximum assist (25% patients effort)

## 2018-05-31 NOTE — PHYSICAL THERAPY INITIAL EVALUATION ADULT - FOLLOWS COMMANDS/ANSWERS QUESTIONS, REHAB EVAL
able to follow single-step instructions/50% of the time
able to follow single-step instructions/50% of the time

## 2018-05-31 NOTE — PROGRESS NOTE ADULT - PROBLEM SELECTOR PLAN 1
Blood cultures with Proteus mirabilis   Urine culture with Proteus mirabilis   Repeat blood cultures pending  Sensitive to Ceftriaxone  Will Start Ceftriaxone  D/C Zosyn  CT A/P with no acute findings  Afebrile  Leukocytosis trending down

## 2018-05-31 NOTE — PHYSICAL THERAPY INITIAL EVALUATION ADULT - PERTINENT HX OF CURRENT PROBLEM, REHAB EVAL
Pt is an 87 y/o male who presented from home with LLE ischemia now s/p AKA. Pt has hx/o dementia. Pt was transferred back to SICU after AMS on floor.

## 2018-05-31 NOTE — PHYSICAL THERAPY INITIAL EVALUATION ADULT - PLANNED THERAPY INTERVENTIONS, PT EVAL
transfer training/balance training/postural re-education/bed mobility training/strengthening
ROM/postural re-education/bed mobility training/balance training/strengthening/transfer training

## 2018-05-31 NOTE — PHYSICAL THERAPY INITIAL EVALUATION ADULT - ADDITIONAL COMMENTS
Pt lives with spouse in a house with 4 steps to enter (+) HR and steps inside in between kitchen/living room. He did not use an AD but daughter states he "shuffled" around.
Pt lives with spouse in a house with 4 steps to enter (+) HR and steps inside in between kitchen/living room. He did not use an AD but daughter states he "shuffled" around.

## 2018-05-31 NOTE — PHYSICAL THERAPY INITIAL EVALUATION ADULT - ACTIVE RANGE OF MOTION EXAMINATION, REHAB EVAL
unable to perform RLE SLR/bilateral upper extremity Active ROM was WFL (within functional limits)
bilateral upper extremity Active ROM was WFL (within functional limits)/Right LE Active ROM was WFL (within functional limits)

## 2018-05-31 NOTE — PROGRESS NOTE ADULT - SUBJECTIVE AND OBJECTIVE BOX
Acute Care Surgery / Trauma Surgery / SICU  [facility] [place]  ===============================  Interval/Overnight Events: no acute events overnight. Mild agitation this morning but redirectable.    VITAL SIGNS:  T(C): 36.9 (05-31-18 @ 12:00), Max: 37.1 (05-30-18 @ 20:00)  HR: 77 (05-31-18 @ 08:00) (66 - 77)  BP: 153/72 (05-31-18 @ 08:00) (144/70 - 173/71)  ABP: --  ABP(mean): --  RR: 20 (05-31-18 @ 08:00) (16 - 20)  SpO2: 97% (05-31-18 @ 08:00) (95% - 97%)  CVP(mm Hg): --    NEUROLOGY:  Neurologic Medications:  acetaminophen   Tablet. 650 milliGRAM(s) Oral every 6 hours  donepezil 10 milliGRAM(s) Oral at bedtime  gabapentin 300 milliGRAM(s) Oral three times a day  melatonin 5 milliGRAM(s) Oral at bedtime    Exam:  Neuro intact. Baseline dementia, but answers questions appropriately and follows commands.    RESPIRATORY:  Respiratory Medications: CTAB    Exam:   Mechanical Ventilation:   ABG - ( 29 May 2018 13:40 )  pH: 7.48  /  pCO2: 34    /  pO2: 71    / HCO3: 26    / Base Excess: 2.1   /  SaO2: 96    / Lactate: x            CARDIOVASCULAR  Cardiovascular Medications:  lisinopril 5 milliGRAM(s) Oral daily  tamsulosin 0.4 milliGRAM(s) Oral at bedtime    Exam: S1S2  VBG - ( 29 May 2018 13:05 )  pH: x     /  pCO2: x     /  pO2: x     / HCO3: x     / Base Excess: x     /  SvO2: x     / Lactate: 1.4        Metabolic/FLUIDS/ELECTROLYTES/NUTRITION:  Gastrointestinal Medications:  bisacodyl Suppository 10 milliGRAM(s) Rectal daily PRN  docusate sodium 100 milliGRAM(s) Oral three times a day  pantoprazole    Tablet 40 milliGRAM(s) Oral before breakfast  senna 2 Tablet(s) Oral at bedtime    I&O's Detail  I&O's Detail    30 May 2018 07:01  -  31 May 2018 07:00  --------------------------------------------------------  IN:    heparin  Infusion.: 272 mL    multiple electrolytes Injection Type 1multiple electrolytes Injection Type 1: 400 mL    Solution: 500 mL    Solution: 100 mL    Solution: 400 mL    Solution: 50 mL  Total IN: 1722 mL    OUT:    Indwelling Catheter - Urethral: 1135 mL  Total OUT: 1135 mL    Total NET: 587 mL      31 May 2018 07:01  -  31 May 2018 12:51  --------------------------------------------------------  IN:    heparin  Infusion.: 72 mL  Total IN: 72 mL    OUT:    Indwelling Catheter - Urethral: 475 mL  Total OUT: 475 mL    Total NET: -403 mL        Daily   05-31    140  |  106  |  14.0  ----------------------------<  103  3.3<L>   |  23.0  |  0.87    Ca    7.6<L>      31 May 2018 05:00  Phos  2.4     05-31  Mg     2.1     05-31        Diet: reg      Endocrine:  CAPILLARY BLOOD GLUCOSE          HEMATOLOGIC:  Hematologic/Oncologic Medications:  aspirin  chewable 81 milliGRAM(s) Oral daily  heparin  Infusion.  Unit(s)/Hr IV Continuous <Continuous>  heparin  Injectable 7000 Unit(s) IV Push every 6 hours PRN  heparin  Injectable 3500 Unit(s) IV Push every 6 hours PRN    [ ] DVT Prophylaxis: hep gtt                                              8.9                   Neurophils% (auto):   76.3   (05-31 @ 05:00):    9.3  )-----------(261          Lymphocytes% (auto):  12.6                                          27.3                   Eosinphils% (auto):   2.7      Manual%: Neutrophils x    ; Lymphocytes x    ; Eosinophils x    ; Bands%: x    ; Blasts x          ( 05-31 @ 05:00 )   PT: x    ;   INR: x      aPTT: 70.3 sec      Comments:    INFECTIOUS DISEASE:    Antimicrobials/Immunologic Medications:  cefTRIAXone Injectable. 1000 milliGRAM(s) IV Push every 24 hours    RECENT CULTURES:  05-29 @ 17:23 .Urine Catheterized Proteus mirabilis    >100,000 CFU/ml Proteus mirabilis      05-29 @ 08:32 .Blood Blood Proteus mirabilis    Growth in anaerobic bottle: Proteus mirabilis  Anaerobic Bottle: 9.35 Hours to positivity  Aerobic Bottle: 11.06 Hours to positivity  .  TYPE: (C=Critical, N=Notification, A=Abnormal) c  TESTS:  _ bld gs  DATE/TIME CALLED: _ 05/29/2018 18:21:21  CALLED TO: _ michelle gonzalez  READ BACK (2 Patient Identifiers)(Y/N): _ y  READ BACK VALUES (Y/N): _ y  CALLED BY: Drew cohn      05-29 @ 08:31 .Blood Blood Proteus mirabilis  Blood Culture PCR    Growth in anaerobic bottle: Proteus mirabilis  Anaerobic Bottle: 9.16 Hours to positivity  Aerobic Bottle: 11.16 Hours to positivity  ***Blood Panel PCR results on this specimen are available  approximately 3 hours after the Gram stain result.***  Gram stain, PCR, and/or culture results may not always  correspond due to difference in methodologies.  ************************************************************  This PCR assay was performed using [employer].  The following targets are tested for: Enterococcus,  vancomycin resistant enterococci, Listeria monocytogenes,  coagulase negative staphylococci, S. aureus,  methicillin resistant S. aureus, Streptococcus agalactiae  (Group B), S. pneumoniae, S. pyogenes (Group A),  Acinetobacter baumannii, Enterobacter cloacae, E. coli,  Klebsiella oxytoca, K. pneumoniae, Proteus sp.,  Serratia marcescens, Haemophilus influenzae,  Neisseria meningitidis, Pseudomonas aeruginosa, Candida  albicans, C. glabrata, C krusei, C parapsilosis,  C. tropicalis and the KPC resistance gene.  "Due to technical problems, Proteus sp. will Not be reported as part of  the BCID panel until further notice"  .  TYPE: (C=Critical, N=Notification, A=Abnormal) c  TESTS:  _ bld gs  DATE/TIME CALLED: _ 05/29/2018 18:23:41  CALLED TO: Drew rn michael gonzalez  READ BACK (2 Patient Identifiers)(Y/N): _ y  READ BACK VALUES (Y/N): _ y  CALLED BY: Drew cohn              ASSESSMENT/PLAN:  88yMale s/p AKA with UTI and bacteremia and PE    Neuro: Target Rass 0, and provide adequate analgesic and anxiolysis. Regulate sleep wake cycle to prevent delirium. Limit narcotics.    CV: Hemodynamically stable    Pulm: on room air breathing comfortably with adequate saturation. Continue hep gtt for PEs.    GI/Nutrition: tolerating diet. bowel regimen.    /Renal: adequate UOP. Remove parra catheter. Continue flomax.    ENDO: Maintain Euglycemia.    HEME: no indication for transfusion    ID: Proteus UTI and bacteremia. Continue abx per ID    Lines/Tubes: PIV    Skin: left AKA site is clean and intact    Proph: hep gtt    Dispo: to floor today

## 2018-05-31 NOTE — PROGRESS NOTE ADULT - SUBJECTIVE AND OBJECTIVE BOX
[facility]  INFECTIOUS DISEASES AND INTERNAL MEDICINE at [place]  =======================================================  Sina Del Valle MD  Diplomates American Board of Internal Medicine and Infectious Diseases  =======================================================    SOSA PERRY [id number]    Follow up: Sepsis    No complaints  Forgetful       Allergies:  No Known Allergies      Antibiotics:  piperacillin/tazobactam IVPB. 3.375 Gram(s) IV Intermittent every 8 hours      REVIEW OF SYSTEMS:  CONSTITUTIONAL:  No Fever or chills  HEENT:   No diplopia or blurred vision.  No earache, sore throat or runny nose.  CARDIOVASCULAR:  No pressure, squeezing, strangling, tightness, heaviness or aching about the chest, neck, axilla or epigastrium.  RESPIRATORY:  No cough, shortness of breath  GASTROINTESTINAL:  No nausea, vomiting or diarrhea.  GENITOURINARY:  No flank pain  MUSCULOSKELETAL:  no joint aches, no muscle pain  SKIN:  No change in skin, hair or nails.  NEUROLOGIC:  No paresthesias, fasciculations  PSYCHIATRIC:  No disorder of thought or mood.  ENDOCRINE:  No heat or cold intolerance  HEMATOLOGICAL:  No easy bruising or bleeding.       Physical Exam:  Vital Signs Last 24 Hrs  T(C): 37.1 (31 May 2018 08:00), Max: 37.1 (30 May 2018 20:00)  T(F): 98.8 (31 May 2018 08:00), Max: 98.8 (31 May 2018 04:00)  HR: 77 (31 May 2018 08:00) (65 - 77)  BP: 153/72 (31 May 2018 08:00) (131/64 - 173/71)  BP(mean): 111 (31 May 2018 07:00) (97 - 112)  RR: 20 (31 May 2018 08:00) (16 - 20)  SpO2: 97% (31 May 2018 08:00) (95% - 99%)      GEN: NAD, pleasant  HEENT: normocephalic and atraumatic. EOMI. PERRL.    NECK: Supple.   LUNGS: Clear to auscultation.  HEART: Regular rate and rhythm  ABDOMEN: Soft, nontender, and nondistended.  Positive bowel sounds.    : No CVA tenderness  EXTREMITIES: Left AKA  MSK: no joint swelling  NEUROLOGIC: Awake, alert  PSYCHIATRIC: Appropriate affect . Forgetful   SKIN: No Rash      Labs:  05-31    140  |  106  |  14.0  ----------------------------<  103  3.3<L>   |  23.0  |  0.87    Ca    7.6<L>      31 May 2018 05:00  Phos  2.4     05-31  Mg     2.1     05-31                            8.9    9.3   )-----------( 261      ( 31 May 2018 05:00 )             27.3       PTT - ( 31 May 2018 05:00 )  PTT:70.3 sec          CAPILLARY BLOOD GLUCOSE        ABG - ( 29 May 2018 13:40 )  pH, Arterial: 7.48  pH, Blood: x     /  pCO2: 34    /  pO2: 71    / HCO3: 26    / Base Excess: 2.1   /  SaO2: 96                  RECENT CULTURES:  05-29 @ 17:23 .Urine Catheterized Proteus mirabilis    >100,000 CFU/ml Proteus mirabilis      05-29 @ 08:32 .Blood Blood Proteus mirabilis    Growth in anaerobic bottle: Proteus mirabilis  Anaerobic Bottle: 9.35 Hours to positivity  Aerobic Bottle: 11.06 Hours to positivity      05-29 @ 08:31 .Blood Blood Proteus mirabilis  Blood Culture PCR    Growth in anaerobic bottle: Proteus mirabilis  Anaerobic Bottle: 9.16 Hours to positivity  Aerobic Bottle: 11.16 Hours to positivity  ***Blood Panel PCR results on this specimen are available  approximately 3 hours after the Gram stain result.***  Gram stain, PCR, and/or culture results may not always  correspond due to difference in methodologies.  ************************************************************  This PCR assay was performed using [employer].  The following targets are tested for: Enterococcus,  vancomycin resistant enterococci, Listeria monocytogenes,  coagulase negative staphylococci, S. aureus,  methicillin resistant S. aureus, Streptococcus agalactiae  (Group B), S. pneumoniae, S. pyogenes (Group A),  Acinetobacter baumannii, Enterobacter cloacae, E. coli,  Klebsiella oxytoca, K. pneumoniae, Proteus sp.,  Serratia marcescens, Haemophilus influenzae,  Neisseria meningitidis, Pseudomonas aeruginosa, Candida  albicans, C. glabrata, C krusei, C parapsilosis,  C. tropicalis and the KPC resistance gene.  "Due to technical problems, Proteus sp. will Not be reported as part of  the BCID panel until further notice"          EXAM:  CT ABDOMEN AND PELVIS IC                        EXAM:  CT ANGIO CHEST (W)AW IC                        PROCEDURE DATE:  05/29/2018    INTERPRETATION:  HISTORY:  Rapid response. Evaluate for pulmonary emboli   or aortic dissection. Patient also has hypoxia and fever. .  Date/Time of exam: 5/29/2018 2:06 PM  TECHNIQUE:  Sections were obtained from the lung apices to the symphysis   pubis following  intravenous contrast.   The patient was given 120 cc of   omnipaque 350. MIP images were obtained and reviewed.  COMPARISON EXAMINATION:     No prior exam.  FINDINGS:    A small pulmonary embolus is noted in the right middle lobe medial   segmental artery. In addition a segmental pulmonary embolus is noted in   the right upper lobe. Mild respiratory motion artifact precludes   evaluation of lower lobe segmental pulmonary arteries. No evidence of   right heart strain. The RV/LV ratio is less than 1.0.  No evidence of mediastinal or hilar lymphadenopathy. There is a small   right pleural effusion. No evidence of a left pleural effusion. No   evidence of a pericardial effusion. No evidence of axillary adenopathy.  There are atherosclerotic changes in the abdominal aorta and thoracic   aorta without evidence of dissection. There is a 4.8 cm aneurysm of the   abdominal aorta. No evidence of rupture. No evidence of periaortic   hematoma. 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 There is a left common iliac artery aneurysm measuring 3.4 cm.  The left lung is clear. Mild atelectasis at the right lung base.  The liver is unremarkable. Status post cholecystectomy. The spleen is not   enlarged and demonstrates no focal abnormality. The pancreatic contour is   unremarkable without evidence of mass, inflammation or ductal dilatation.   The adrenal glands demonstrate normal size and contour.  The kidneys reveal a normal enhanced morphology.  The celiac artery and superior mesenteric artery are patent. There is a   single renal artery on each side, patent. The inferior mesenteric artery   is obstructed at its origin but reconstitutes after several CM. This   finding is unchanged since 5/20/2018..  No evidence of retroperitoneal lymphadenopathy or hematoma. No evidence   of pelvic lymphadenopathy or hematoma. A small amount of air is noted in   the bladder in keeping with recent instrumentation.  Status post prostatectomy.  Mild diverticulosis of the colon. No evidence of diverticulitis or colitis.  Degenerative changes in the spine.  IMPRESSION:   Exam positive for small pulmonary emboli in the right upper lobe and   right middle lobe. No evidence of right heart strain.  Small right pleural effusion and right basilar atelectasis.  No evidence of aortic dissection. Abdominal aortic aneurysm as well as   bilateral common iliac artery aneurysms.  Diverticulosis without evidence of diverticulitis.

## 2018-06-01 LAB
ANION GAP SERPL CALC-SCNC: 12 MMOL/L — SIGNIFICANT CHANGE UP (ref 5–17)
APTT BLD: 51.1 SEC — HIGH (ref 27.5–37.4)
BASOPHILS # BLD AUTO: 0 K/UL — SIGNIFICANT CHANGE UP (ref 0–0.2)
BASOPHILS NFR BLD AUTO: 0.4 % — SIGNIFICANT CHANGE UP (ref 0–2)
BUN SERPL-MCNC: 12 MG/DL — SIGNIFICANT CHANGE UP (ref 8–20)
CALCIUM SERPL-MCNC: 8.3 MG/DL — LOW (ref 8.6–10.2)
CHLORIDE SERPL-SCNC: 106 MMOL/L — SIGNIFICANT CHANGE UP (ref 98–107)
CO2 SERPL-SCNC: 23 MMOL/L — SIGNIFICANT CHANGE UP (ref 22–29)
CREAT SERPL-MCNC: 0.85 MG/DL — SIGNIFICANT CHANGE UP (ref 0.5–1.3)
EOSINOPHIL # BLD AUTO: 0.2 K/UL — SIGNIFICANT CHANGE UP (ref 0–0.5)
EOSINOPHIL NFR BLD AUTO: 2.6 % — SIGNIFICANT CHANGE UP (ref 0–5)
GLUCOSE SERPL-MCNC: 105 MG/DL — SIGNIFICANT CHANGE UP (ref 70–115)
HCT VFR BLD CALC: 30.7 % — LOW (ref 42–52)
HGB BLD-MCNC: 9.7 G/DL — LOW (ref 14–18)
LYMPHOCYTES # BLD AUTO: 1.1 K/UL — SIGNIFICANT CHANGE UP (ref 1–4.8)
LYMPHOCYTES # BLD AUTO: 13.6 % — LOW (ref 20–55)
MAGNESIUM SERPL-MCNC: 1.9 MG/DL — SIGNIFICANT CHANGE UP (ref 1.6–2.6)
MCHC RBC-ENTMCNC: 31.2 PG — HIGH (ref 27–31)
MCHC RBC-ENTMCNC: 31.6 G/DL — LOW (ref 32–36)
MCV RBC AUTO: 98.7 FL — HIGH (ref 80–94)
MONOCYTES # BLD AUTO: 0.6 K/UL — SIGNIFICANT CHANGE UP (ref 0–0.8)
MONOCYTES NFR BLD AUTO: 6.8 % — SIGNIFICANT CHANGE UP (ref 3–10)
NEUTROPHILS # BLD AUTO: 6.4 K/UL — SIGNIFICANT CHANGE UP (ref 1.8–8)
NEUTROPHILS NFR BLD AUTO: 76.2 % — HIGH (ref 37–73)
PHOSPHATE SERPL-MCNC: 2.8 MG/DL — SIGNIFICANT CHANGE UP (ref 2.4–4.7)
PLATELET # BLD AUTO: 305 K/UL — SIGNIFICANT CHANGE UP (ref 150–400)
POTASSIUM SERPL-MCNC: 4.1 MMOL/L — SIGNIFICANT CHANGE UP (ref 3.5–5.3)
POTASSIUM SERPL-SCNC: 4.1 MMOL/L — SIGNIFICANT CHANGE UP (ref 3.5–5.3)
RBC # BLD: 3.11 M/UL — LOW (ref 4.6–6.2)
RBC # FLD: 14 % — SIGNIFICANT CHANGE UP (ref 11–15.6)
SODIUM SERPL-SCNC: 141 MMOL/L — SIGNIFICANT CHANGE UP (ref 135–145)
WBC # BLD: 8.4 K/UL — SIGNIFICANT CHANGE UP (ref 4.8–10.8)
WBC # FLD AUTO: 8.4 K/UL — SIGNIFICANT CHANGE UP (ref 4.8–10.8)

## 2018-06-01 PROCEDURE — 99232 SBSQ HOSP IP/OBS MODERATE 35: CPT

## 2018-06-01 RX ORDER — APIXABAN 2.5 MG/1
2.5 TABLET, FILM COATED ORAL EVERY 12 HOURS
Qty: 0 | Refills: 0 | Status: DISCONTINUED | OUTPATIENT
Start: 2018-06-01 | End: 2018-06-04

## 2018-06-01 RX ADMIN — SENNA PLUS 2 TABLET(S): 8.6 TABLET ORAL at 22:01

## 2018-06-01 RX ADMIN — Medication 100 MILLIGRAM(S): at 05:03

## 2018-06-01 RX ADMIN — LISINOPRIL 5 MILLIGRAM(S): 2.5 TABLET ORAL at 05:03

## 2018-06-01 RX ADMIN — DONEPEZIL HYDROCHLORIDE 10 MILLIGRAM(S): 10 TABLET, FILM COATED ORAL at 22:02

## 2018-06-01 RX ADMIN — Medication 650 MILLIGRAM(S): at 22:31

## 2018-06-01 RX ADMIN — Medication 5 MILLIGRAM(S): at 22:01

## 2018-06-01 RX ADMIN — CEFTRIAXONE 1000 MILLIGRAM(S): 500 INJECTION, POWDER, FOR SOLUTION INTRAMUSCULAR; INTRAVENOUS at 13:04

## 2018-06-01 RX ADMIN — Medication 100 MILLIGRAM(S): at 22:01

## 2018-06-01 RX ADMIN — GABAPENTIN 300 MILLIGRAM(S): 400 CAPSULE ORAL at 13:05

## 2018-06-01 RX ADMIN — APIXABAN 2.5 MILLIGRAM(S): 2.5 TABLET, FILM COATED ORAL at 17:12

## 2018-06-01 RX ADMIN — TAMSULOSIN HYDROCHLORIDE 0.4 MILLIGRAM(S): 0.4 CAPSULE ORAL at 22:01

## 2018-06-01 RX ADMIN — Medication 650 MILLIGRAM(S): at 13:04

## 2018-06-01 RX ADMIN — ATORVASTATIN CALCIUM 10 MILLIGRAM(S): 80 TABLET, FILM COATED ORAL at 22:01

## 2018-06-01 RX ADMIN — GABAPENTIN 300 MILLIGRAM(S): 400 CAPSULE ORAL at 05:03

## 2018-06-01 RX ADMIN — Medication 650 MILLIGRAM(S): at 22:01

## 2018-06-01 RX ADMIN — Medication 650 MILLIGRAM(S): at 05:03

## 2018-06-01 RX ADMIN — GABAPENTIN 300 MILLIGRAM(S): 400 CAPSULE ORAL at 22:01

## 2018-06-01 RX ADMIN — Medication 650 MILLIGRAM(S): at 06:10

## 2018-06-01 RX ADMIN — PANTOPRAZOLE SODIUM 40 MILLIGRAM(S): 20 TABLET, DELAYED RELEASE ORAL at 05:03

## 2018-06-01 RX ADMIN — Medication 100 MILLIGRAM(S): at 13:04

## 2018-06-01 RX ADMIN — Medication 650 MILLIGRAM(S): at 17:12

## 2018-06-01 RX ADMIN — Medication 81 MILLIGRAM(S): at 13:04

## 2018-06-01 NOTE — PROGRESS NOTE ADULT - SUBJECTIVE AND OBJECTIVE BOX
Patient transferred out of the ICU.   Provided assistance with set up of tray, as patient was hungry.   Reports no pain this morning.   States he feels he slept well.     FUNCTIONAL PROGRESS  5/31  Bed Mobility: Scooting/Bridging:     · Level of [place]	maximum assist (25% patients effort)	  · Physical Assist/Nonphysical Assist	2 person assist	    Bed Mobility: Sit to Supine:     · Level of [place]	maximum assist (25% patients effort)	  · Physical Assist/Nonphysical Assist	2 person assist	    Bed Mobility: Supine to Sit:     · Level of [place]	moderate assist (50% patients effort); maximum assist (25% patients effort)	  · Physical Assist/Nonphysical Assist	2 person assist	    Bed Mobility Analysis:     · Bed Mobility Limitations	decreased ability to use legs for bridging/pushing; impaired ability to control trunk for mobility	  · Impairments Contributing to Impaired Bed Mobility	impaired postural control	    Transfer: Sit to Stand:     · Level of [place]	unable to perform	    Gait Skills:     · Level of [place]	unable to perform	        REVIEW OF SYSTEMS  Constitutional - No fever,  +fatigue  Neurological - No headaches, +memory loss, +loss of strength    VITALS  T(C): 37.1 (06-01-18 @ 07:00), Max: 37.1 (06-01-18 @ 07:00)  HR: 81 (06-01-18 @ 07:00) (62 - 81)  BP: 160/87 (06-01-18 @ 07:00) (145/73 - 178/88)  RR: 18 (06-01-18 @ 07:00) (18 - 20)  SpO2: 97% (06-01-18 @ 07:00) (97% - 99%)  Wt(kg): --    MEDICATIONS   acetaminophen   Tablet. 650 milliGRAM(s) every 6 hours  aspirin  chewable 81 milliGRAM(s) daily  atorvastatin 10 milliGRAM(s) at bedtime  bisacodyl Suppository 10 milliGRAM(s) daily PRN  cefTRIAXone Injectable. 1000 milliGRAM(s) every 24 hours  docusate sodium 100 milliGRAM(s) three times a day  donepezil 10 milliGRAM(s) at bedtime  gabapentin 300 milliGRAM(s) three times a day  heparin  Infusion.  Unit(s)/Hr <Continuous>  heparin  Injectable 7000 Unit(s) every 6 hours PRN  heparin  Injectable 3500 Unit(s) every 6 hours PRN  lisinopril 5 milliGRAM(s) daily  melatonin 5 milliGRAM(s) at bedtime  pantoprazole    Tablet 40 milliGRAM(s) before breakfast  senna 2 Tablet(s) at bedtime  tamsulosin 0.4 milliGRAM(s) at bedtime      RECENT LABS/IMAGING  CBC Full  -  ( 01 Jun 2018 08:09 )  WBC Count : 8.4 K/uL  Hemoglobin : 9.7 g/dL  Hematocrit : 30.7 %  Platelet Count - Automated : 305 K/uL  Mean Cell Volume : 98.7 fl  Mean Cell Hemoglobin : 31.2 pg  Mean Cell Hemoglobin Concentration : 31.6 g/dL  Auto Neutrophil # : 6.4 K/uL  Auto Lymphocyte # : 1.1 K/uL  Auto Monocyte # : 0.6 K/uL  Auto Eosinophil # : 0.2 K/uL  Auto Basophil # : 0.0 K/uL  Auto Neutrophil % : 76.2 %  Auto Lymphocyte % : 13.6 %  Auto Monocyte % : 6.8 %  Auto Eosinophil % : 2.6 %  Auto Basophil % : 0.4 %    05-31    140  |  106  |  14.0  ----------------------------<  103  3.3<L>   |  23.0  |  0.87    Ca    7.6<L>      31 May 2018 05:00  Phos  2.4     05-31  Mg     2.1     05-31      ------------------------------------------------------------  PHYSICAL EXAM  Constitutional - NAD, Comfortable  Extremities - Right AKA, incision - CDI, Staples, Swollen, pain to palpation  Neurologic Exam -                    Cognitive - Awake, alert, oriented to self, hospital, part of situation     Motor -   Focal deficits to the BUE SF - proximal weakness, BLE - right HF 2/5, Left LE HF 1/5    Psychiatric - Mood is stable  ----------------------------------------------------------------------------------------  ASSESSMENT/PLAN  88yMale with functional deficits after left AKA   Pain - Tylenol  Phantom pain and sensation - Neurontin 300mg TID	  PEs - SCDs, Heparin Drip	  CAD - ASA  Bacteremia/Urosepsis - Rocephin  Rehab - Continue to recommend ACUTE inpatient rehabilitation for the functional deficits consisting of 3 hours of therapy/day & 24 hour RN/daily PMR physician for comorbid medical management. Will continue to follow for ongoing rehab needs and recommendations. Patient will be able to tolerate 3 hours a day. Despite cognitive status, patient was independent prior. Do not expect patient to have a prosthesis long term, but short term would need rehab to assist with transfers for  as his primary mode of ambulation.

## 2018-06-01 NOTE — OCCUPATIONAL THERAPY INITIAL EVALUATION ADULT - PERTINENT HX OF CURRENT PROBLEM, REHAB EVAL
Pt to ED as transfer from [place] with LLE pulseless, blue, cold to touch; pt reports numbness to left foot/LE with pain. CT Angio with multiple irregular filling defects lie in the lumen of the left SFA, the left SFA occludes by the adductor canal; there is no restore of distal arteries of the LLE, concern for severe arterial compromise to the left calf/foot; unknown if the irregular filling defects in the left SFA represent irregular mural thrombus, evere vascular disease vs. thromboemboli

## 2018-06-01 NOTE — PROGRESS NOTE ADULT - SUBJECTIVE AND OBJECTIVE BOX
[facility]  INFECTIOUS DISEASES AND INTERNAL MEDICINE at [place]  =======================================================  Sina Del Valle MD  Diplomates American Board of Internal Medicine and Infectious Diseases  =======================================================    SOSA PERRY [id number]    Follow up: Sepsis    No complaints  Forgetful       Allergies:  No Known Allergies      Antibiotics:  cefTRIAXone Injectable. 1000 milliGRAM(s) IV Push every 24 hours      REVIEW OF SYSTEMS:  UNRELIABLE, PATIENT IS NOT A RELIABLE HISTORIAN  CONSTITUTIONAL:  No Fever or chills  HEENT:   No diplopia or blurred vision.  No sore throat   CARDIOVASCULAR:  No pressure, squeezing, strangling, tightness, heaviness or aching about the chest, neck, axilla or epigastrium.  RESPIRATORY:  No cough, shortness of breath  GASTROINTESTINAL:  No nausea, vomiting or diarrhea.  GENITOURINARY:  No flank pain  MUSCULOSKELETAL:  no joint aches, no muscle pain  SKIN:  No change in skin, hair or nails.  NEUROLOGIC:  No paresthesias, fasciculations  PSYCHIATRIC:  No disorder of thought or mood.  ENDOCRINE:  No heat or cold intolerance  HEMATOLOGICAL:  No easy bruising or bleeding.       Physical Exam:  Vital Signs Last 24 Hrs  T(C): 37.1 (01 Jun 2018 07:00), Max: 37.1 (01 Jun 2018 07:00)  T(F): 98.8 (01 Jun 2018 07:00), Max: 98.8 (01 Jun 2018 07:00)  HR: 81 (01 Jun 2018 07:00) (62 - 81)  BP: 160/87 (01 Jun 2018 07:00) (145/73 - 178/88)  RR: 18 (01 Jun 2018 07:00) (18 - 20)  SpO2: 97% (01 Jun 2018 07:00) (97% - 99%)      GEN: NAD, pleasant  HEENT: normocephalic and atraumatic. EOMI. PERRL.    NECK: Supple.   LUNGS: Clear to auscultation.  HEART: Regular rate and rhythm  ABDOMEN: Soft, nontender, and nondistended.  Positive bowel sounds.    : No CVA tenderness  EXTREMITIES: Left AKA  MSK: no joint swelling  NEUROLOGIC: Awake, alert  PSYCHIATRIC: Appropriate affect . Forgetful   SKIN: No Rash      Labs:  06-01    141  |  106  |  12.0  ----------------------------<  105  4.1   |  23.0  |  0.85    Ca    8.3<L>      01 Jun 2018 08:09  Phos  2.8     06-01  Mg     1.9     06-01               9.7    8.4   )-----------( 305      ( 01 Jun 2018 08:09 )             30.7       PTT - ( 01 Jun 2018 08:09 )  PTT:51.1 sec      RECENT CULTURES:  05-29 @ 17:23 .Urine Catheterized Proteus mirabilis    >100,000 CFU/ml Proteus mirabilis      05-29 @ 08:32 .Blood Blood Proteus mirabilis    Growth in anaerobic bottle: Proteus mirabilis  Anaerobic Bottle: 9.35 Hours to positivity  Aerobic Bottle: 11.06 Hours to positivity      05-29 @ 08:31 .Blood Blood Proteus mirabilis  Blood Culture PCR    Growth in anaerobic bottle: Proteus mirabilis  Anaerobic Bottle: 9.16 Hours to positivity  Aerobic Bottle: 11.16 Hours to positivity  ***Blood Panel PCR results on this specimen are available  approximately 3 hours after the Gram stain result.***  Gram stain, PCR, and/or culture results may not always  correspond due to difference in methodologies.  ************************************************************  This PCR assay was performed using [employer].  The following targets are tested for: Enterococcus,  vancomycin resistant enterococci, Listeria monocytogenes,  coagulase negative staphylococci, S. aureus,  methicillin resistant S. aureus, Streptococcus agalactiae  (Group B), S. pneumoniae, S. pyogenes (Group A),  Acinetobacter baumannii, Enterobacter cloacae, E. coli,  Klebsiella oxytoca, K. pneumoniae, Proteus sp.,  Serratia marcescens, Haemophilus influenzae,  Neisseria meningitidis, Pseudomonas aeruginosa, Candida  albicans, C. glabrata, C krusei, C parapsilosis,  C. tropicalis and the KPC resistance gene.  "Due to technical problems, Proteus sp. will Not be reported as part of  the BCID panel until further notice"        EXAM:  CT ABDOMEN AND PELVIS IC                        EXAM:  CT ANGIO CHEST (W)AW IC                        PROCEDURE DATE:  05/29/2018    INTERPRETATION:  HISTORY:  Rapid response. Evaluate for pulmonary emboli   or aortic dissection. Patient also has hypoxia and fever. .  Date/Time of exam: 5/29/2018 2:06 PM  TECHNIQUE:  Sections were obtained from the lung apices to the symphysis   pubis following  intravenous contrast.   The patient was given 120 cc of   omnipaque 350. MIP images were obtained and reviewed.  COMPARISON EXAMINATION:     No prior exam.  FINDINGS:    A small pulmonary embolus is noted in the right middle lobe medial   segmental artery. In addition a segmental pulmonary embolus is noted in   the right upper lobe. Mild respiratory motion artifact precludes   evaluation of lower lobe segmental pulmonary arteries. No evidence of   right heart strain. The RV/LV ratio is less than 1.0.  No evidence of mediastinal or hilar lymphadenopathy. There is a small   right pleural effusion. No evidence of a left pleural effusion. No   evidence of a pericardial effusion. No evidence of axillary adenopathy.  There are atherosclerotic changes in the abdominal aorta and thoracic   aorta without evidence of dissection. There is a 4.8 cm aneurysm of the   abdominal aorta. No evidence of rupture. No evidence of periaortic   hematoma. 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 There is a left common iliac artery aneurysm measuring 3.4 cm.  The left lung is clear. Mild atelectasis at the right lung base.  The liver is unremarkable. Status post cholecystectomy. The spleen is not   enlarged and demonstrates no focal abnormality. The pancreatic contour is   unremarkable without evidence of mass, inflammation or ductal dilatation.   The adrenal glands demonstrate normal size and contour.  The kidneys reveal a normal enhanced morphology.  The celiac artery and superior mesenteric artery are patent. There is a   single renal artery on each side, patent. The inferior mesenteric artery   is obstructed at its origin but reconstitutes after several CM. This   finding is unchanged since 5/20/2018..  No evidence of retroperitoneal lymphadenopathy or hematoma. No evidence   of pelvic lymphadenopathy or hematoma. A small amount of air is noted in   the bladder in keeping with recent instrumentation.  Status post prostatectomy.  Mild diverticulosis of the colon. No evidence of diverticulitis or colitis.  Degenerative changes in the spine.  IMPRESSION:   Exam positive for small pulmonary emboli in the right upper lobe and   right middle lobe. No evidence of right heart strain.  Small right pleural effusion and right basilar atelectasis.  No evidence of aortic dissection. Abdominal aortic aneurysm as well as   bilateral common iliac artery aneurysms.  Diverticulosis without evidence of diverticulitis.

## 2018-06-01 NOTE — PROGRESS NOTE ADULT - SUBJECTIVE AND OBJECTIVE BOX
Feels ok no pain  passing gas  afebrile  UTI + proteusLeft AKA    wound clean no drainage nontender  Doing well   supportive care

## 2018-06-01 NOTE — OCCUPATIONAL THERAPY INITIAL EVALUATION ADULT - ADDITIONAL COMMENTS
Pt lives in private home with 4 SWAPNA and 1 step inside down to den area. Bedroom and bathroom on main level. Bathroom has bathtub with curtains. Pt owns a cane. Pt is right handed. Pt does not drive. Pt's wife is available to assist however is elderly as well; wife drives. Daughter reports pt "shuffled" around however was independent without device prior to admission.

## 2018-06-01 NOTE — PROGRESS NOTE ADULT - SUBJECTIVE AND OBJECTIVE BOX
Patient is a 88y old  Male who presents with a chief complaint of cold L leg (25 May 2018 15:12)    Pt is S/P  L AKA  Pt noted with +PE on CTA  Has bacteremia and UTI                  Downgraded to floor yesterday. No events overnight                    PAST MEDICAL & SURGICAL HISTORY:  DVT (deep venous thrombosis)  Cataract of both eyes  Prostate CA  AAA (abdominal aortic aneurysm)  Alzheimer disease  Hypertension  DVT (deep venous thrombosis)  AAA (abdominal aortic aneurysm): 5cm  Right iliac aneurysm 4cm  Alzheimer disease  Cataract  Prostate ca  Gallstones  History of cataract surgery  H/O prostatectomy  History of cholecystectomy  H/O prostatectomy: 1998  Status post cataract surgery: bilateral  History of cholecystectomy: 1997    MEDICATIONS  (STANDING):  acetaminophen   Tablet. 650 milliGRAM(s) Oral every 6 hours  aspirin  chewable 81 milliGRAM(s) Oral daily  atorvastatin 10 milliGRAM(s) Oral at bedtime  cefTRIAXone Injectable. 1000 milliGRAM(s) IV Push every 24 hours  docusate sodium 100 milliGRAM(s) Oral three times a day  donepezil 10 milliGRAM(s) Oral at bedtime  gabapentin 300 milliGRAM(s) Oral three times a day  heparin  Infusion.  Unit(s)/Hr (16 mL/Hr) IV Continuous <Continuous>  lisinopril 5 milliGRAM(s) Oral daily  melatonin 5 milliGRAM(s) Oral at bedtime  pantoprazole    Tablet 40 milliGRAM(s) Oral before breakfast  senna 2 Tablet(s) Oral at bedtime  tamsulosin 0.4 milliGRAM(s) Oral at bedtime    MEDICATIONS  (PRN):  bisacodyl Suppository 10 milliGRAM(s) Rectal daily PRN Constipation  heparin  Injectable 7000 Unit(s) IV Push every 6 hours PRN For aPTT less than 40  heparin  Injectable 3500 Unit(s) IV Push every 6 hours PRN For aPTT between 40 - 57    Vital Signs Last 24 Hrs  T(C): 37.1 (01 Jun 2018 07:00), Max: 37.1 (01 Jun 2018 07:00)  T(F): 98.8 (01 Jun 2018 07:00), Max: 98.8 (01 Jun 2018 07:00)  HR: 81 (01 Jun 2018 07:00) (62 - 81)  BP: 160/87 (01 Jun 2018 07:00) (145/73 - 178/88)  BP(mean): --  RR: 18 (01 Jun 2018 07:00) (18 - 20)  SpO2: 97% (01 Jun 2018 07:00) (97% - 99%)    Physical Exam:  General: Awake, alert. Interactive. NAD  Extremities: L AKA incision with staples intact. No erythema or drainage. + edema, + ecchymosis    LABS:                        9.7    8.4   )-----------( 305      ( 01 Jun 2018 08:09 )             30.7   05-31    140  |  106  |  14.0  ----------------------------<  103  3.3<L>   |  23.0  |  0.87    Ca    7.6<L>      31 May 2018 05:00  Phos  2.4     05-31  Mg     2.1     05-31      RECENT CULTURES:  05-29 .Urine Catheterized Proteus mirabilis XXXX   >100,000 CFU/ml Proteus mirabilis    05-29 .Blood Blood Proteus mirabilis XXXX   Growth in anaerobic bottle: Proteus mirabilis  Anaerobic Bottle: 9.35 Hours to positivity  Aerobic Bottle: 11.06 Hours to positivity  .  TYPE: (C=Critical, N=Notification, A=Abnormal) c  TESTS:  _ bld   DATE/TIME CALLED: _ 05/29/2018 18:21:21  CALLED TO: Drew gonzalez  READ BACK (2 Patient Identifiers)(Y/N): _ y  READ BACK VALUES (Y/N): _ y  CALLED BY: Drew cohn    05-29 .Blood Blood Proteus mirabilis  Blood Culture PCR XXXX   Growth in anaerobic bottle: Proteus mirabilis  Anaerobic Bottle: 9.16 Hours to positivity  Aerobic Bottle: 11.16 Hours to positivity  ***Blood Panel PCR results on this specimen are available  approximately 3 hours after the Gram stain result.***  Gram stain, PCR, and/or culture results may not always  correspond due to difference in methodologies.  ************************************************************  This PCR assay was performed using [employer].  The following targets are tested for: Enterococcus,  vancomycin resistant enterococci, Listeria monocytogenes,  coagulase negative staphylococci, S. aureus,  methicillin resistant S. aureus, Streptococcus agalactiae  (Group B), S. pneumoniae, S. pyogenes (Group A),  Acinetobacter baumannii, Enterobacter cloacae, E. coli,  Klebsiella oxytoca, K. pneumoniae, Proteus sp.,  Serratia marcescens, Haemophilus influenzae,  Neisseria meningitidis, Pseudomonas aeruginosa, Candida  albicans, C. glabrata, C krusei, C parapsilosis,  C. tropicalis and the KPC resistance gene.  "Due to technical problems, Proteus sp. will Not be reported as part of  the BCID panel until further notice"  .  TYPE: (C=Critical, N=Notification, A=Abnormal) c  TESTS:  _ bld gs  DATE/TIME CALLED: _ 05/29/2018 18:23:41  CALLED TO: Drew gonzalez  READ BACK (2 Patient Identifiers)(Y/N): _ y  READ BACK VALUES (Y/N): _ y  CALLED BY: Drew cohn        Radiology and Additional Studies:    EXAM:  CT ABDOMEN AND PELVIS IC                         EXAM:  CT ANGIO CHEST (W)AW IC                          PROCEDURE DATE:  05/29/2018          INTERPRETATION:  HISTORY:  Rapid response. Evaluate for pulmonary emboli   or aortic dissection. Patient also has hypoxia and fever. .    Date/Time of exam: 5/29/2018 2:06 PM    TECHNIQUE:  Sections were obtained from the lung apices to the symphysis   pubis following  intravenous contrast.   The patient was given 120 cc of   omnipaque 350. MIP images were obtained and reviewed.    COMPARISON EXAMINATION:     No prior exam.    FINDINGS:    A small pulmonary embolus is noted in the right middle lobe medial   segmental artery. In addition a segmental pulmonary embolus is noted in   the right upper lobe. Mild respiratory motion artifact precludes   evaluation of lower lobe segmental pulmonary arteries. No evidence of   right heart strain. The RV/LV ratio is less than 1.0.    No evidence of mediastinal or hilar lymphadenopathy. There is a small   right pleural effusion. No evidence of a left pleural effusion. No   evidence of a pericardial effusion. No evidence of axillary adenopathy.    There are atherosclerotic changes in the abdominal aorta and thoracic   aorta without evidence of dissection. There is a 4.8 cm aneurysm of the   abdominal aorta. No evidence of rupture. No evidence of periaortic   hematoma. 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 There is a left common iliac artery aneurysm measuring 3.4 cm.    The left lung is clear. Mild atelectasis at the right lung base.    The liver is unremarkable. Status post cholecystectomy. The spleen is not   enlarged and demonstrates no focal abnormality. The pancreatic contour is   unremarkable without evidence of mass, inflammation or ductal dilatation.   The adrenal glands demonstrate normal size and contour.    The kidneys reveal a normal enhanced morphology.    The celiac artery and superior mesenteric artery are patent. There is a   single renalartery on each side, patent. The inferior mesenteric artery   is obstructed at its origin but reconstitutes after several CM. This   finding is unchanged since 5/20/2018..    No evidence of retroperitoneal lymphadenopathy or hematoma. No evidence   of pelvic lymphadenopathy or hematoma. A small amount of air is noted in   the bladder in keeping with recent instrumentation.    Status post prostatectomy.    Mild diverticulosis of the colon. No evidence of diverticulitis or   colitis.    Degenerative changes in the spine.        IMPRESSION:     Exam positive for small pulmonary emboli in the right upper lobe and   right middle lobe. No evidence of right heart strain.    Small right pleural effusion and right basilar atelectasis.    No evidence of aortic dissection. Abdominal aortic aneurysm as well as   bilateral common iliac artery aneurysms.    Diverticulosis without evidence of diverticulitis.        Assessment:[id number]:  88 year old male w/PMH of Alzheimers, prostate ca s/p prostatectomy transferred here from [place] with pulselessness, pain and numbness to [facility] that developed around 9pm . Patients family reports a recent diagnosis of DVT to [facility] and started Lovenox / coumadin therapy about 10 days ago with a recent INR of 2.3 a few days ago, however INR - 4.2 on repeat labs here . Lactate uptrending from 2.6 to 4.7 . Pt comes in on cardene gtt for SBP > 200 .     S/P L AKA  Was rapid response for possible sepsis and AMS  CT head/abd/chest performed-Pt noted with +PE  Transferred to ICu and started on Heparin gtt  +BC with Proteus  Urine with Proteus    Plan:  Cont AC. Will transition from Heparin to eliquis tonight  Cont ASA and Statin  Cont ABX as per ID-duration?. F/U repeat BC(ordered)  OOB/Ambulate/PT   PM&R eval appreciated  DC planning for Acute rehab

## 2018-06-01 NOTE — OCCUPATIONAL THERAPY INITIAL EVALUATION ADULT - ORIENTATION, REHAB EVAL
person/pt unable to identify place or date despite choices/cues; pt able to identify home town with choices

## 2018-06-01 NOTE — OCCUPATIONAL THERAPY INITIAL EVALUATION ADULT - MANUAL MUSCLE TESTING RESULTS, REHAB EVAL
bilateral shoulders grossly assessed with AROM against gravity 3/5, bilateral elbows grossly assessed with AROM against gravity 3/5, bilateral gross grasp 4/5

## 2018-06-01 NOTE — OCCUPATIONAL THERAPY INITIAL EVALUATION ADULT - MODIFIED CLINICAL TEST OF SENSORY INTEGRATION IN BALANCE TEST
static sitting edge of bed with moderate assistance; dynamic sitting with maximum assistance; standing unsafe at this time, not assessed

## 2018-06-01 NOTE — PROGRESS NOTE ADULT - PROBLEM SELECTOR PLAN 1
Blood cultures with Proteus mirabilis   Urine culture with Proteus mirabilis   Repeat blood cultures pending  Sensitive to Ceftriaxone  Continue Ceftriaxone  CT A/P with no acute findings  Afebrile  Leukocytosis trending down

## 2018-06-02 PROCEDURE — 99232 SBSQ HOSP IP/OBS MODERATE 35: CPT

## 2018-06-02 RX ADMIN — APIXABAN 2.5 MILLIGRAM(S): 2.5 TABLET, FILM COATED ORAL at 06:02

## 2018-06-02 RX ADMIN — Medication 5 MILLIGRAM(S): at 21:45

## 2018-06-02 RX ADMIN — Medication 100 MILLIGRAM(S): at 13:36

## 2018-06-02 RX ADMIN — TAMSULOSIN HYDROCHLORIDE 0.4 MILLIGRAM(S): 0.4 CAPSULE ORAL at 21:45

## 2018-06-02 RX ADMIN — Medication 650 MILLIGRAM(S): at 10:35

## 2018-06-02 RX ADMIN — PANTOPRAZOLE SODIUM 40 MILLIGRAM(S): 20 TABLET, DELAYED RELEASE ORAL at 06:01

## 2018-06-02 RX ADMIN — CEFTRIAXONE 1000 MILLIGRAM(S): 500 INJECTION, POWDER, FOR SOLUTION INTRAMUSCULAR; INTRAVENOUS at 13:35

## 2018-06-02 RX ADMIN — GABAPENTIN 300 MILLIGRAM(S): 400 CAPSULE ORAL at 21:45

## 2018-06-02 RX ADMIN — Medication 650 MILLIGRAM(S): at 06:31

## 2018-06-02 RX ADMIN — LISINOPRIL 5 MILLIGRAM(S): 2.5 TABLET ORAL at 06:01

## 2018-06-02 RX ADMIN — ATORVASTATIN CALCIUM 10 MILLIGRAM(S): 80 TABLET, FILM COATED ORAL at 21:45

## 2018-06-02 RX ADMIN — Medication 650 MILLIGRAM(S): at 21:45

## 2018-06-02 RX ADMIN — Medication 650 MILLIGRAM(S): at 16:48

## 2018-06-02 RX ADMIN — Medication 650 MILLIGRAM(S): at 22:15

## 2018-06-02 RX ADMIN — GABAPENTIN 300 MILLIGRAM(S): 400 CAPSULE ORAL at 06:01

## 2018-06-02 RX ADMIN — APIXABAN 2.5 MILLIGRAM(S): 2.5 TABLET, FILM COATED ORAL at 16:48

## 2018-06-02 RX ADMIN — Medication 650 MILLIGRAM(S): at 06:01

## 2018-06-02 RX ADMIN — GABAPENTIN 300 MILLIGRAM(S): 400 CAPSULE ORAL at 13:37

## 2018-06-02 RX ADMIN — DONEPEZIL HYDROCHLORIDE 10 MILLIGRAM(S): 10 TABLET, FILM COATED ORAL at 21:45

## 2018-06-02 RX ADMIN — Medication 81 MILLIGRAM(S): at 13:36

## 2018-06-02 NOTE — PROGRESS NOTE ADULT - PROBLEM SELECTOR PLAN 1
Continue antibiotic course as per ID  Continue regular diet  Continue eliquis  Discharge planning as per ID

## 2018-06-02 NOTE — PROGRESS NOTE ADULT - SUBJECTIVE AND OBJECTIVE BOX
Subjective:  No acute events overnight. Pt doing well this morning    MEDICATIONS  (STANDING):  acetaminophen   Tablet. 650 milliGRAM(s) Oral every 6 hours  apixaban 2.5 milliGRAM(s) Oral every 12 hours  aspirin  chewable 81 milliGRAM(s) Oral daily  atorvastatin 10 milliGRAM(s) Oral at bedtime  cefTRIAXone Injectable. 1000 milliGRAM(s) IV Push every 24 hours  docusate sodium 100 milliGRAM(s) Oral three times a day  donepezil 10 milliGRAM(s) Oral at bedtime  gabapentin 300 milliGRAM(s) Oral three times a day  lisinopril 5 milliGRAM(s) Oral daily  melatonin 5 milliGRAM(s) Oral at bedtime  pantoprazole    Tablet 40 milliGRAM(s) Oral before breakfast  senna 2 Tablet(s) Oral at bedtime  tamsulosin 0.4 milliGRAM(s) Oral at bedtime    MEDICATIONS  (PRN):  bisacodyl Suppository 10 milliGRAM(s) Rectal daily PRN Constipation      Vital Signs Last 24 Hrs  T(C): 36.7 (02 Jun 2018 08:41), Max: 36.8 (01 Jun 2018 23:35)  T(F): 98 (02 Jun 2018 08:41), Max: 98.2 (01 Jun 2018 23:35)  HR: 84 (02 Jun 2018 08:41) (73 - 84)  BP: 175/85 (02 Jun 2018 08:41) (156/70 - 175/85)  BP(mean): --  RR: 18 (02 Jun 2018 08:41) (18 - 20)  SpO2: 97% (02 Jun 2018 08:41) (95% - 97%)    Physical Exam:    Constitutional: NAD  HEENT: PERRL, EOMI  Neck: No JVD, FROM without pain  Respiratory: Respirations non-labored, no accessory muscle use  Gastrointestinal: Soft, non-tender, non-distended  Extremities: No peripheral edema, No cyanosis, surgical wound well approximated with skin clips, no evidence of infection  Neurological: A&O x 1, pleasantly delirious

## 2018-06-02 NOTE — PROGRESS NOTE ADULT - PROBLEM SELECTOR PLAN 1
Blood cultures with Proteus mirabilis   Urine culture with Proteus mirabilis   Repeat blood cultures no growth 5/31/18  Sensitive to Ceftriaxone  Continue Ceftriaxone  CT A/P with no acute findings  Afebrile  Leukocytosis trending down  Will need 2 weeks of antibiotics till 6/13/18

## 2018-06-02 NOTE — PROGRESS NOTE ADULT - SUBJECTIVE AND OBJECTIVE BOX
[facility]  INFECTIOUS DISEASES AND INTERNAL MEDICINE at [place]  =======================================================  Sina Del Valle MD  Diplomates American Board of Internal Medicine and Infectious Diseases  =======================================================    SOSA PERRY [id number]    Follow up: Sepsis    No complaints  Forgetful       Allergies:  No Known Allergies      Antibiotics:  cefTRIAXone Injectable. 1000 milliGRAM(s) IV Push every 24 hours      REVIEW OF SYSTEMS:  UNRELIABLE, PATIENT IS NOT A RELIABLE HISTORIAN  CONSTITUTIONAL:  No Fever or chills  HEENT:   No diplopia or blurred vision.  No sore throat   CARDIOVASCULAR:  No pressure, squeezing, strangling, tightness, heaviness or aching about the chest, neck, axilla or epigastrium.  RESPIRATORY:  No cough, shortness of breath  GASTROINTESTINAL:  No nausea, vomiting  GENITOURINARY:  No flank pain  MUSCULOSKELETAL:  no joint aches, no muscle pain  SKIN:  No change in skin, hair or nails.  NEUROLOGIC:  No paresthesias, fasciculations  PSYCHIATRIC:  No disorder of thought or mood.  ENDOCRINE:  No heat or cold intolerance  HEMATOLOGICAL:  No easy bruising or bleeding.       Physical Exam:  Vital Signs Last 24 Hrs  T(C): 36.7 (02 Jun 2018 08:41), Max: 36.8 (01 Jun 2018 23:35)  T(F): 98 (02 Jun 2018 08:41), Max: 98.2 (01 Jun 2018 23:35)  HR: 84 (02 Jun 2018 08:41) (73 - 84)  BP: 175/85 (02 Jun 2018 08:41) (156/70 - 175/85)  RR: 18 (02 Jun 2018 08:41) (18 - 20)  SpO2: 97% (02 Jun 2018 08:41) (95% - 97%)      GEN: NAD, pleasant  HEENT: normocephalic and atraumatic. EOMI. PERRL.    NECK: Supple.   LUNGS: Clear to auscultation.  HEART: Regular rate and rhythm  ABDOMEN: Soft, nontender, and nondistended.  Positive bowel sounds.    : No CVA tenderness  EXTREMITIES: Left AKA  MSK: no joint swelling  NEUROLOGIC: Awake, alert  PSYCHIATRIC: Appropriate affect . Forgetful   SKIN: No Rash      Labs:  06-01    141  |  106  |  12.0  ----------------------------<  105  4.1   |  23.0  |  0.85    Ca    8.3<L>      01 Jun 2018 08:09  Phos  2.8     06-01  Mg     1.9     06-01                 9.7    8.4   )-----------( 305      ( 01 Jun 2018 08:09 )             30.7       PTT - ( 01 Jun 2018 08:09 )  PTT:51.1 sec        RECENT CULTURES:  05-31 @ 05:43 .Blood Blood     No growth at 48 hours      05-29 @ 17:23 .Urine Catheterized Proteus mirabilis    >100,000 CFU/ml Proteus mirabilis      05-29 @ 08:32 .Blood Blood Proteus mirabilis    Growth in anaerobic bottle: Proteus mirabilis  Anaerobic Bottle: 9.35 Hours to positivity  Aerobic Bottle: 11.06 Hours to positivity      05-29 @ 08:31 .Blood Blood Proteus mirabilis  Blood Culture PCR    Growth in anaerobic bottle: Proteus mirabilis  Anaerobic Bottle: 9.16 Hours to positivity  Aerobic Bottle: 11.16 Hours to positivity  ***Blood Panel PCR results on this specimen are available  approximately 3 hours after the Gram stain result.***  Gram stain, PCR, and/or culture results may not always  correspond due to difference in methodologies.  ************************************************************  This PCR assay was performed using [employer].  The following targets are tested for: Enterococcus,  vancomycin resistant enterococci, Listeria monocytogenes,  coagulase negative staphylococci, S. aureus,  methicillin resistant S. aureus, Streptococcus agalactiae  (Group B), S. pneumoniae, S. pyogenes (Group A),  Acinetobacter baumannii, Enterobacter cloacae, E. coli,  Klebsiella oxytoca, K. pneumoniae, Proteus sp.,  Serratia marcescens, Haemophilus influenzae,  Neisseria meningitidis, Pseudomonas aeruginosa, Candida  albicans, C. glabrata, C krusei, C parapsilosis,  C. tropicalis and the KPC resistance gene.  "Due to technical problems, Proteus sp. will Not be reported as part of  the BCID panel until further notice"                RECENT CULTURES:  05-29 @ 17:23 .Urine Catheterized Proteus mirabilis    >100,000 CFU/ml Proteus mirabilis      05-29 @ 08:32 .Blood Blood Proteus mirabilis    Growth in anaerobic bottle: Proteus mirabilis  Anaerobic Bottle: 9.35 Hours to positivity  Aerobic Bottle: 11.06 Hours to positivity      05-29 @ 08:31 .Blood Blood Proteus mirabilis  Blood Culture PCR    Growth in anaerobic bottle: Proteus mirabilis  Anaerobic Bottle: 9.16 Hours to positivity  Aerobic Bottle: 11.16 Hours to positivity  ***Blood Panel PCR results on this specimen are available  approximately 3 hours after the Gram stain result.***  Gram stain, PCR, and/or culture results may not always  correspond due to difference in methodologies.  ************************************************************  This PCR assay was performed using [employer].  The following targets are tested for: Enterococcus,  vancomycin resistant enterococci, Listeria monocytogenes,  coagulase negative staphylococci, S. aureus,  methicillin resistant S. aureus, Streptococcus agalactiae  (Group B), S. pneumoniae, S. pyogenes (Group A),  Acinetobacter baumannii, Enterobacter cloacae, E. coli,  Klebsiella oxytoca, K. pneumoniae, Proteus sp.,  Serratia marcescens, Haemophilus influenzae,  Neisseria meningitidis, Pseudomonas aeruginosa, Candida  albicans, C. glabrata, C krusei, C parapsilosis,  C. tropicalis and the KPC resistance gene.  "Due to technical problems, Proteus sp. will Not be reported as part of  the BCID panel until further notice"        EXAM:  CT ABDOMEN AND PELVIS IC                        EXAM:  CT ANGIO CHEST (W)AW IC                        PROCEDURE DATE:  05/29/2018    INTERPRETATION:  HISTORY:  Rapid response. Evaluate for pulmonary emboli   or aortic dissection. Patient also has hypoxia and fever. .  Date/Time of exam: 5/29/2018 2:06 PM  TECHNIQUE:  Sections were obtained from the lung apices to the symphysis   pubis following  intravenous contrast.   The patient was given 120 cc of   omnipaque 350. MIP images were obtained and reviewed.  COMPARISON EXAMINATION:     No prior exam.  FINDINGS:    A small pulmonary embolus is noted in the right middle lobe medial   segmental artery. In addition a segmental pulmonary embolus is noted in   the right upper lobe. Mild respiratory motion artifact precludes   evaluation of lower lobe segmental pulmonary arteries. No evidence of   right heart strain. The RV/LV ratio is less than 1.0.  No evidence of mediastinal or hilar lymphadenopathy. There is a small   right pleural effusion. No evidence of a left pleural effusion. No   evidence of a pericardial effusion. No evidence of axillary adenopathy.  There are atherosclerotic changes in the abdominal aorta and thoracic   aorta without evidence of dissection. There is a 4.8 cm aneurysm of the   abdominal aorta. No evidence of rupture. No evidence of periaortic   hematoma. 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 There is a left common iliac artery aneurysm measuring 3.4 cm.  The left lung is clear. Mild atelectasis at the right lung base.  The liver is unremarkable. Status post cholecystectomy. The spleen is not   enlarged and demonstrates no focal abnormality. The pancreatic contour is   unremarkable without evidence of mass, inflammation or ductal dilatation.   The adrenal glands demonstrate normal size and contour.  The kidneys reveal a normal enhanced morphology.  The celiac artery and superior mesenteric artery are patent. There is a   single renal artery on each side, patent. The inferior mesenteric artery   is obstructed at its origin but reconstitutes after several CM. This   finding is unchanged since 5/20/2018..  No evidence of retroperitoneal lymphadenopathy or hematoma. No evidence   of pelvic lymphadenopathy or hematoma. A small amount of air is noted in   the bladder in keeping with recent instrumentation.  Status post prostatectomy.  Mild diverticulosis of the colon. No evidence of diverticulitis or colitis.  Degenerative changes in the spine.  IMPRESSION:   Exam positive for small pulmonary emboli in the right upper lobe and   right middle lobe. No evidence of right heart strain.  Small right pleural effusion and right basilar atelectasis.  No evidence of aortic dissection. Abdominal aortic aneurysm as well as   bilateral common iliac artery aneurysms.  Diverticulosis without evidence of diverticulitis.

## 2018-06-03 PROCEDURE — 99232 SBSQ HOSP IP/OBS MODERATE 35: CPT

## 2018-06-03 RX ORDER — AMLODIPINE BESYLATE 2.5 MG/1
5 TABLET ORAL DAILY
Qty: 0 | Refills: 0 | Status: DISCONTINUED | OUTPATIENT
Start: 2018-06-03 | End: 2018-06-04

## 2018-06-03 RX ADMIN — Medication 5 MILLIGRAM(S): at 21:33

## 2018-06-03 RX ADMIN — GABAPENTIN 300 MILLIGRAM(S): 400 CAPSULE ORAL at 21:34

## 2018-06-03 RX ADMIN — AMLODIPINE BESYLATE 5 MILLIGRAM(S): 2.5 TABLET ORAL at 16:20

## 2018-06-03 RX ADMIN — Medication 650 MILLIGRAM(S): at 13:04

## 2018-06-03 RX ADMIN — Medication 650 MILLIGRAM(S): at 12:04

## 2018-06-03 RX ADMIN — Medication 100 MILLIGRAM(S): at 21:33

## 2018-06-03 RX ADMIN — ATORVASTATIN CALCIUM 10 MILLIGRAM(S): 80 TABLET, FILM COATED ORAL at 21:34

## 2018-06-03 RX ADMIN — Medication 100 MILLIGRAM(S): at 13:32

## 2018-06-03 RX ADMIN — LISINOPRIL 5 MILLIGRAM(S): 2.5 TABLET ORAL at 06:22

## 2018-06-03 RX ADMIN — Medication 650 MILLIGRAM(S): at 06:52

## 2018-06-03 RX ADMIN — PANTOPRAZOLE SODIUM 40 MILLIGRAM(S): 20 TABLET, DELAYED RELEASE ORAL at 06:22

## 2018-06-03 RX ADMIN — Medication 650 MILLIGRAM(S): at 18:14

## 2018-06-03 RX ADMIN — APIXABAN 2.5 MILLIGRAM(S): 2.5 TABLET, FILM COATED ORAL at 18:14

## 2018-06-03 RX ADMIN — GABAPENTIN 300 MILLIGRAM(S): 400 CAPSULE ORAL at 13:32

## 2018-06-03 RX ADMIN — Medication 81 MILLIGRAM(S): at 12:04

## 2018-06-03 RX ADMIN — SENNA PLUS 2 TABLET(S): 8.6 TABLET ORAL at 21:33

## 2018-06-03 RX ADMIN — APIXABAN 2.5 MILLIGRAM(S): 2.5 TABLET, FILM COATED ORAL at 06:22

## 2018-06-03 RX ADMIN — Medication 100 MILLIGRAM(S): at 06:22

## 2018-06-03 RX ADMIN — CEFTRIAXONE 1000 MILLIGRAM(S): 500 INJECTION, POWDER, FOR SOLUTION INTRAMUSCULAR; INTRAVENOUS at 12:04

## 2018-06-03 RX ADMIN — TAMSULOSIN HYDROCHLORIDE 0.4 MILLIGRAM(S): 0.4 CAPSULE ORAL at 21:33

## 2018-06-03 RX ADMIN — Medication 650 MILLIGRAM(S): at 06:22

## 2018-06-03 RX ADMIN — GABAPENTIN 300 MILLIGRAM(S): 400 CAPSULE ORAL at 06:22

## 2018-06-03 RX ADMIN — DONEPEZIL HYDROCHLORIDE 10 MILLIGRAM(S): 10 TABLET, FILM COATED ORAL at 21:34

## 2018-06-03 NOTE — PROGRESS NOTE ADULT - PROBLEM SELECTOR PLAN 1
Blood cultures with Proteus mirabilis   Urine culture with Proteus mirabilis   Repeat blood cultures no growth 5/31/18  Sensitive to Ceftriaxone  Continue Ceftriaxone 1gm IV q34fylpm  CT A/P with no acute findings  Afebrile  Leukocytosis trending down  Will need 2 weeks of antibiotics till 6/13/18  If patient going to Rehab can complete IV antibiotics at Rehab  Vascular surgery to place Midline

## 2018-06-03 NOTE — PROGRESS NOTE ADULT - SUBJECTIVE AND OBJECTIVE BOX
[facility]  INFECTIOUS DISEASES AND INTERNAL MEDICINE at [place]  =======================================================  Sina Del Valle MD  Diplomates American Board of Internal Medicine and Infectious Diseases  =======================================================    SOSA PERRY [id number]    Follow up: Sepsis    No complaints  Forgetful       Allergies:  No Known Allergies      Antibiotics:  cefTRIAXone Injectable. 1000 milliGRAM(s) IV Push every 24 hours      REVIEW OF SYSTEMS:  UNRELIABLE, PATIENT IS NOT A RELIABLE HISTORIAN  CONSTITUTIONAL:  No Fever or chills  HEENT:   No diplopia or blurred vision.  No sore throat   CARDIOVASCULAR:  No pressure, squeezing, strangling, tightness, heaviness or aching about the chest, neck, axilla or epigastrium.  RESPIRATORY:  No cough, shortness of breath  GASTROINTESTINAL:  No nausea, vomiting  GENITOURINARY:  No flank pain  MUSCULOSKELETAL:  no joint aches, no muscle pain  SKIN:  No change in skin, hair or nails.  NEUROLOGIC:  No paresthesias, fasciculations  PSYCHIATRIC:  No disorder of thought or mood.  ENDOCRINE:  No heat or cold intolerance  HEMATOLOGICAL:  No easy bruising or bleeding.       Physical Exam:  Vital Signs Last 24 Hrs  T(C): 36.7 (03 Jun 2018 07:00), Max: 37.1 (02 Jun 2018 23:34)  T(F): 98.1 (03 Jun 2018 07:00), Max: 98.7 (02 Jun 2018 23:34)  HR: 73 (03 Jun 2018 07:00) (61 - 84)  BP: 155/73 (03 Jun 2018 07:00) (148/67 - 175/85)  RR: 18 (03 Jun 2018 07:00) (18 - 19)  SpO2: 97% (03 Jun 2018 07:00) (91% - 97%)      GEN: NAD, pleasant  HEENT: normocephalic and atraumatic. EOMI. PERRL.    NECK: Supple.   LUNGS: Clear to auscultation.  HEART: Regular rate and rhythm  ABDOMEN: Soft, nontender, and nondistended.  Positive bowel sounds.    : No CVA tenderness  EXTREMITIES: Left AKA  MSK: no joint swelling  NEUROLOGIC: Awake, alert  PSYCHIATRIC: Appropriate affect . Forgetful   SKIN: No Rash      Labs:  06-01    141  |  106  |  12.0  ----------------------------<  105  4.1   |  23.0  |  0.85    Ca    8.3<L>      01 Jun 2018 08:09  Phos  2.8     06-01  Mg     1.9     06-01                 9.7    8.4   )-----------( 305      ( 01 Jun 2018 08:09 )             30.7       PTT - ( 01 Jun 2018 08:09 )  PTT:51.1 sec      RECENT CULTURES:  05-31 @ 05:43 .Blood Blood     No growth at 48 hours      05-29 @ 17:23 .Urine Catheterized Proteus mirabilis    >100,000 CFU/ml Proteus mirabilis      05-29 @ 08:32 .Blood Blood Proteus mirabilis    Growth in anaerobic bottle: Proteus mirabilis  Anaerobic Bottle: 9.35 Hours to positivity  Aerobic Bottle: 11.06 Hours to positivity      05-29 @ 08:31 .Blood Blood Proteus mirabilis  Blood Culture PCR    Growth in anaerobic bottle: Proteus mirabilis  Anaerobic Bottle: 9.16 Hours to positivity  Aerobic Bottle: 11.16 Hours to positivity  ***Blood Panel PCR results on this specimen are available  approximately 3 hours after the Gram stain result.***  Gram stain, PCR, and/or culture results may not always  correspond due to difference in methodologies.  ************************************************************  This PCR assay was performed using [employer].  The following targets are tested for: Enterococcus,  vancomycin resistant enterococci, Listeria monocytogenes,  coagulase negative staphylococci, S. aureus,  methicillin resistant S. aureus, Streptococcus agalactiae  (Group B), S. pneumoniae, S. pyogenes (Group A),  Acinetobacter baumannii, Enterobacter cloacae, E. coli,  Klebsiella oxytoca, K. pneumoniae, Proteus sp.,  Serratia marcescens, Haemophilus influenzae,  Neisseria meningitidis, Pseudomonas aeruginosa, Candida  albicans, C. glabrata, C krusei, C parapsilosis,  C. tropicalis and the KPC resistance gene.  "Due to technical problems, Proteus sp. will Not be reported as part of  the BCID panel until further notice"        EXAM:  CT ABDOMEN AND PELVIS IC                        EXAM:  CT ANGIO CHEST (W)AW IC                        PROCEDURE DATE:  05/29/2018    INTERPRETATION:  HISTORY:  Rapid response. Evaluate for pulmonary emboli   or aortic dissection. Patient also has hypoxia and fever. .  Date/Time of exam: 5/29/2018 2:06 PM  TECHNIQUE:  Sections were obtained from the lung apices to the symphysis   pubis following  intravenous contrast.   The patient was given 120 cc of   omnipaque 350. MIP images were obtained and reviewed.  COMPARISON EXAMINATION:     No prior exam.  FINDINGS:    A small pulmonary embolus is noted in the right middle lobe medial   segmental artery. In addition a segmental pulmonary embolus is noted in   the right upper lobe. Mild respiratory motion artifact precludes   evaluation of lower lobe segmental pulmonary arteries. No evidence of   right heart strain. The RV/LV ratio is less than 1.0.  No evidence of mediastinal or hilar lymphadenopathy. There is a small   right pleural effusion. No evidence of a left pleural effusion. No   evidence of a pericardial effusion. No evidence of axillary adenopathy.  There are atherosclerotic changes in the abdominal aorta and thoracic   aorta without evidence of dissection. There is a 4.8 cm aneurysm of the   abdominal aorta. No evidence of rupture. No evidence of periaortic   hematoma. 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 There is a left common iliac artery aneurysm measuring 3.4 cm.  The left lung is clear. Mild atelectasis at the right lung base.  The liver is unremarkable. Status post cholecystectomy. The spleen is not   enlarged and demonstrates no focal abnormality. The pancreatic contour is   unremarkable without evidence of mass, inflammation or ductal dilatation.   The adrenal glands demonstrate normal size and contour.  The kidneys reveal a normal enhanced morphology.  The celiac artery and superior mesenteric artery are patent. There is a   single renal artery on each side, patent. The inferior mesenteric artery   is obstructed at its origin but reconstitutes after several CM. This   finding is unchanged since 5/20/2018..  No evidence of retroperitoneal lymphadenopathy or hematoma. No evidence   of pelvic lymphadenopathy or hematoma. A small amount of air is noted in   the bladder in keeping with recent instrumentation.  Status post prostatectomy.  Mild diverticulosis of the colon. No evidence of diverticulitis or colitis.  Degenerative changes in the spine.  IMPRESSION:   Exam positive for small pulmonary emboli in the right upper lobe and   right middle lobe. No evidence of right heart strain.  Small right pleural effusion and right basilar atelectasis.  No evidence of aortic dissection. Abdominal aortic aneurysm as well as   bilateral common iliac artery aneurysms.  Diverticulosis without evidence of diverticulitis.

## 2018-06-03 NOTE — PROGRESS NOTE ADULT - PROBLEM SELECTOR PLAN 3
CT Chest with PE  On Heparin drip  Management per surgery
improving.  Will continue diet and Méndez for I/O. Continue trend Cr and intermittent CK.

## 2018-06-03 NOTE — PROGRESS NOTE ADULT - ATTENDING COMMENTS
COUNSELING:    Face to face meeting to discuss Advanced Care Planning - Time Spent ______ Minutes.  See goals of care note.    More than 50% time spent in counseling and coordinating care. ___20___ Minutes.     Thank you for the opportunity to assist with the care of this patient.   [facility] [facility] [phone].
Will Follow

## 2018-06-03 NOTE — PROGRESS NOTE ADULT - SUBJECTIVE AND OBJECTIVE BOX
Patient is a 88y old  Male who presents with a chief complaint of cold L leg (25 May 2018 15:12)    Pt is S/P  L AKA c/b UTI and bacteremia  On Ceftriaxone  Doing well. No complaints    Vital Signs Last 24 Hrs  T(C): 36.7 (03 Jun 2018 07:00), Max: 37.1 (02 Jun 2018 23:34)  T(F): 98.1 (03 Jun 2018 07:00), Max: 98.7 (02 Jun 2018 23:34)  HR: 73 (03 Jun 2018 07:00) (61 - 73)  BP: 155/73 (03 Jun 2018 07:00) (148/67 - 155/80)  BP(mean): --  RR: 18 (03 Jun 2018 07:00) (18 - 19)  SpO2: 97% (03 Jun 2018 07:00) (91% - 97%)  I&O's Detail    cefTRIAXone Injectable. 1000  apixaban 2.5  aspirin  chewable 81  cefTRIAXone Injectable. 1000  lisinopril 5  tamsulosin 0.4    PAST MEDICAL & SURGICAL HISTORY:  DVT (deep venous thrombosis)  Cataract of both eyes  Prostate CA  AAA (abdominal aortic aneurysm)  Alzheimer disease  Hypertension  DVT (deep venous thrombosis)  AAA (abdominal aortic aneurysm): 5cm  Right iliac aneurysm 4cm  Alzheimer disease  Cataract  Prostate ca  Gallstones  History of cataract surgery  H/O prostatectomy  History of cholecystectomy  H/O prostatectomy: 1998  Status post cataract surgery: bilateral  History of cholecystectomy: 1997    Physical Exam:  General: NAD, resting comfortably in bed  Extremities: L AKA incision CDI with staples intact. Post thigh ecchymosis resolving      Assessment:88yMaleHPI:  88 year old male w/PMH of Alzheimers, prostate ca s/p prostatectomy transferred here from [place] with pulselessness, pain and numbness to [facility] that developed around 9pm . Patients family reports a recent diagnosis of DVT to [facility] and started Lovenox / coumadin therapy about 10 days ago with a recent INR of 2.3 a few days ago, however INR - 4.2 on repeat labs here . Lactate uptrending from 2.6 to 4.7 . Pt comes in on cardene gtt for SBP > 200 .   S/P  L AKA c/b UTI and bacteremia_    Plan:  Cont AC and Statin  OOB/PT. Accepted to Acute rehab when medically stable  Will get midline/PICC tomorrow for IV abx thru 6/13. Appreciate ID input  Likely will DC Mon or Tues if bed available

## 2018-06-04 ENCOUNTER — INPATIENT (INPATIENT)
Facility: HOSPITAL | Age: 83
LOS: 18 days | Discharge: NURSING, REHAB OR LONG-TERM CARE | DRG: 949 | End: 2018-06-23
Attending: PHYSICAL MEDICINE & REHABILITATION | Admitting: PHYSICAL MEDICINE & REHABILITATION
Payer: MEDICARE

## 2018-06-04 VITALS
OXYGEN SATURATION: 98 % | DIASTOLIC BLOOD PRESSURE: 80 MMHG | HEART RATE: 80 BPM | RESPIRATION RATE: 18 BRPM | TEMPERATURE: 98 F | SYSTOLIC BLOOD PRESSURE: 138 MMHG

## 2018-06-04 DIAGNOSIS — Z98.89 OTHER SPECIFIED POSTPROCEDURAL STATES: Chronic | ICD-10-CM

## 2018-06-04 DIAGNOSIS — Z90.49 ACQUIRED ABSENCE OF OTHER SPECIFIED PARTS OF DIGESTIVE TRACT: Chronic | ICD-10-CM

## 2018-06-04 DIAGNOSIS — Z98.49 CATARACT EXTRACTION STATUS, UNSPECIFIED EYE: Chronic | ICD-10-CM

## 2018-06-04 DIAGNOSIS — Z90.79 ACQUIRED ABSENCE OF OTHER GENITAL ORGAN(S): Chronic | ICD-10-CM

## 2018-06-04 DIAGNOSIS — Z89.619 ACQUIRED ABSENCE OF UNSPECIFIED LEG ABOVE KNEE: ICD-10-CM

## 2018-06-04 PROBLEM — I10 ESSENTIAL (PRIMARY) HYPERTENSION: Chronic | Status: ACTIVE | Noted: 2018-05-20

## 2018-06-04 PROBLEM — G30.9 ALZHEIMER'S DISEASE, UNSPECIFIED: Chronic | Status: ACTIVE | Noted: 2018-05-20

## 2018-06-04 LAB
ANION GAP SERPL CALC-SCNC: 12 MMOL/L — SIGNIFICANT CHANGE UP (ref 5–17)
BUN SERPL-MCNC: 11 MG/DL — SIGNIFICANT CHANGE UP (ref 8–20)
CALCIUM SERPL-MCNC: 9 MG/DL — SIGNIFICANT CHANGE UP (ref 8.6–10.2)
CHLORIDE SERPL-SCNC: 100 MMOL/L — SIGNIFICANT CHANGE UP (ref 98–107)
CO2 SERPL-SCNC: 24 MMOL/L — SIGNIFICANT CHANGE UP (ref 22–29)
CREAT SERPL-MCNC: 0.8 MG/DL — SIGNIFICANT CHANGE UP (ref 0.5–1.3)
GLUCOSE SERPL-MCNC: 110 MG/DL — SIGNIFICANT CHANGE UP (ref 70–115)
HCT VFR BLD CALC: 34.4 % — LOW (ref 42–52)
HGB BLD-MCNC: 10.7 G/DL — LOW (ref 14–18)
MCHC RBC-ENTMCNC: 30.8 PG — SIGNIFICANT CHANGE UP (ref 27–31)
MCHC RBC-ENTMCNC: 31.1 G/DL — LOW (ref 32–36)
MCV RBC AUTO: 99.1 FL — HIGH (ref 80–94)
PLATELET # BLD AUTO: 393 K/UL — SIGNIFICANT CHANGE UP (ref 150–400)
POTASSIUM SERPL-MCNC: 3.8 MMOL/L — SIGNIFICANT CHANGE UP (ref 3.5–5.3)
POTASSIUM SERPL-SCNC: 3.8 MMOL/L — SIGNIFICANT CHANGE UP (ref 3.5–5.3)
RBC # BLD: 3.47 M/UL — LOW (ref 4.6–6.2)
RBC # FLD: 14.3 % — SIGNIFICANT CHANGE UP (ref 11–15.6)
SODIUM SERPL-SCNC: 136 MMOL/L — SIGNIFICANT CHANGE UP (ref 135–145)
WBC # BLD: 7.2 K/UL — SIGNIFICANT CHANGE UP (ref 4.8–10.8)
WBC # FLD AUTO: 7.2 K/UL — SIGNIFICANT CHANGE UP (ref 4.8–10.8)

## 2018-06-04 PROCEDURE — 82803 BLOOD GASES ANY COMBINATION: CPT

## 2018-06-04 PROCEDURE — 84443 ASSAY THYROID STIM HORMONE: CPT

## 2018-06-04 PROCEDURE — 96375 TX/PRO/DX INJ NEW DRUG ADDON: CPT | Mod: XU

## 2018-06-04 PROCEDURE — 85014 HEMATOCRIT: CPT

## 2018-06-04 PROCEDURE — 87040 BLOOD CULTURE FOR BACTERIA: CPT

## 2018-06-04 PROCEDURE — 36430 TRANSFUSION BLD/BLD COMPNT: CPT

## 2018-06-04 PROCEDURE — 87493 C DIFF AMPLIFIED PROBE: CPT

## 2018-06-04 PROCEDURE — 93306 TTE W/DOPPLER COMPLETE: CPT

## 2018-06-04 PROCEDURE — 76937 US GUIDE VASCULAR ACCESS: CPT | Mod: 26

## 2018-06-04 PROCEDURE — 97110 THERAPEUTIC EXERCISES: CPT

## 2018-06-04 PROCEDURE — 99285 EMERGENCY DEPT VISIT HI MDM: CPT | Mod: 25

## 2018-06-04 PROCEDURE — 97112 NEUROMUSCULAR REEDUCATION: CPT

## 2018-06-04 PROCEDURE — 82553 CREATINE MB FRACTION: CPT

## 2018-06-04 PROCEDURE — 82435 ASSAY OF BLOOD CHLORIDE: CPT

## 2018-06-04 PROCEDURE — 71275 CT ANGIOGRAPHY CHEST: CPT

## 2018-06-04 PROCEDURE — 74018 RADEX ABDOMEN 1 VIEW: CPT

## 2018-06-04 PROCEDURE — 71045 X-RAY EXAM CHEST 1 VIEW: CPT

## 2018-06-04 PROCEDURE — 82962 GLUCOSE BLOOD TEST: CPT

## 2018-06-04 PROCEDURE — P9059: CPT

## 2018-06-04 PROCEDURE — 70450 CT HEAD/BRAIN W/O DYE: CPT

## 2018-06-04 PROCEDURE — 84145 PROCALCITONIN (PCT): CPT

## 2018-06-04 PROCEDURE — 84100 ASSAY OF PHOSPHORUS: CPT

## 2018-06-04 PROCEDURE — 96374 THER/PROPH/DIAG INJ IV PUSH: CPT | Mod: XU

## 2018-06-04 PROCEDURE — 74177 CT ABD & PELVIS W/CONTRAST: CPT

## 2018-06-04 PROCEDURE — 97167 OT EVAL HIGH COMPLEX 60 MIN: CPT

## 2018-06-04 PROCEDURE — 86850 RBC ANTIBODY SCREEN: CPT

## 2018-06-04 PROCEDURE — 83605 ASSAY OF LACTIC ACID: CPT

## 2018-06-04 PROCEDURE — 87186 SC STD MICRODIL/AGAR DIL: CPT

## 2018-06-04 PROCEDURE — 82330 ASSAY OF CALCIUM: CPT

## 2018-06-04 PROCEDURE — 93971 EXTREMITY STUDY: CPT

## 2018-06-04 PROCEDURE — 87150 DNA/RNA AMPLIFIED PROBE: CPT

## 2018-06-04 PROCEDURE — 97530 THERAPEUTIC ACTIVITIES: CPT

## 2018-06-04 PROCEDURE — 84295 ASSAY OF SERUM SODIUM: CPT

## 2018-06-04 PROCEDURE — 85730 THROMBOPLASTIN TIME PARTIAL: CPT

## 2018-06-04 PROCEDURE — 73552 X-RAY EXAM OF FEMUR 2/>: CPT

## 2018-06-04 PROCEDURE — 86923 COMPATIBILITY TEST ELECTRIC: CPT

## 2018-06-04 PROCEDURE — 36415 COLL VENOUS BLD VENIPUNCTURE: CPT

## 2018-06-04 PROCEDURE — 93005 ELECTROCARDIOGRAM TRACING: CPT

## 2018-06-04 PROCEDURE — 82550 ASSAY OF CK (CPK): CPT

## 2018-06-04 PROCEDURE — 87086 URINE CULTURE/COLONY COUNT: CPT

## 2018-06-04 PROCEDURE — 86901 BLOOD TYPING SEROLOGIC RH(D): CPT

## 2018-06-04 PROCEDURE — 75635 CT ANGIO ABDOMINAL ARTERIES: CPT

## 2018-06-04 PROCEDURE — 82947 ASSAY GLUCOSE BLOOD QUANT: CPT

## 2018-06-04 PROCEDURE — 85610 PROTHROMBIN TIME: CPT

## 2018-06-04 PROCEDURE — 86900 BLOOD TYPING SEROLOGIC ABO: CPT

## 2018-06-04 PROCEDURE — 36569 INSJ PICC 5 YR+ W/O IMAGING: CPT

## 2018-06-04 PROCEDURE — 99232 SBSQ HOSP IP/OBS MODERATE 35: CPT

## 2018-06-04 PROCEDURE — 85027 COMPLETE CBC AUTOMATED: CPT

## 2018-06-04 PROCEDURE — 80053 COMPREHEN METABOLIC PANEL: CPT

## 2018-06-04 PROCEDURE — 84132 ASSAY OF SERUM POTASSIUM: CPT

## 2018-06-04 PROCEDURE — 88311 DECALCIFY TISSUE: CPT

## 2018-06-04 PROCEDURE — 80048 BASIC METABOLIC PNL TOTAL CA: CPT

## 2018-06-04 PROCEDURE — 83735 ASSAY OF MAGNESIUM: CPT

## 2018-06-04 PROCEDURE — 88307 TISSUE EXAM BY PATHOLOGIST: CPT

## 2018-06-04 RX ORDER — APIXABAN 2.5 MG/1
1 TABLET, FILM COATED ORAL
Qty: 0 | Refills: 0 | COMMUNITY
Start: 2018-06-04

## 2018-06-04 RX ORDER — CEFTRIAXONE 500 MG/1
1 INJECTION, POWDER, FOR SOLUTION INTRAMUSCULAR; INTRAVENOUS
Qty: 0 | Refills: 0 | COMMUNITY
Start: 2018-06-04

## 2018-06-04 RX ORDER — AMLODIPINE BESYLATE 2.5 MG/1
1 TABLET ORAL
Qty: 0 | Refills: 0 | COMMUNITY
Start: 2018-06-04

## 2018-06-04 RX ADMIN — PANTOPRAZOLE SODIUM 40 MILLIGRAM(S): 20 TABLET, DELAYED RELEASE ORAL at 05:46

## 2018-06-04 RX ADMIN — Medication 650 MILLIGRAM(S): at 00:32

## 2018-06-04 RX ADMIN — Medication 81 MILLIGRAM(S): at 12:38

## 2018-06-04 RX ADMIN — Medication 100 MILLIGRAM(S): at 12:38

## 2018-06-04 RX ADMIN — Medication 650 MILLIGRAM(S): at 01:02

## 2018-06-04 RX ADMIN — Medication 650 MILLIGRAM(S): at 11:26

## 2018-06-04 RX ADMIN — Medication 650 MILLIGRAM(S): at 06:15

## 2018-06-04 RX ADMIN — GABAPENTIN 300 MILLIGRAM(S): 400 CAPSULE ORAL at 12:39

## 2018-06-04 RX ADMIN — LISINOPRIL 5 MILLIGRAM(S): 2.5 TABLET ORAL at 05:46

## 2018-06-04 RX ADMIN — APIXABAN 2.5 MILLIGRAM(S): 2.5 TABLET, FILM COATED ORAL at 05:46

## 2018-06-04 RX ADMIN — CEFTRIAXONE 1000 MILLIGRAM(S): 500 INJECTION, POWDER, FOR SOLUTION INTRAMUSCULAR; INTRAVENOUS at 12:38

## 2018-06-04 RX ADMIN — AMLODIPINE BESYLATE 5 MILLIGRAM(S): 2.5 TABLET ORAL at 05:48

## 2018-06-04 RX ADMIN — Medication 650 MILLIGRAM(S): at 05:45

## 2018-06-04 RX ADMIN — GABAPENTIN 300 MILLIGRAM(S): 400 CAPSULE ORAL at 05:45

## 2018-06-04 RX ADMIN — Medication 650 MILLIGRAM(S): at 10:45

## 2018-06-04 RX ADMIN — Medication 100 MILLIGRAM(S): at 05:46

## 2018-06-04 NOTE — PROGRESS NOTE ADULT - ASSESSMENT
87 y/o M with tachycardia, tachypnea, fever and hypoxia. CT reporting PE and blood and urine cultures with Proteus mirabilis   FOR D/C TO REHAB ON IV ROCEPHIN THROUGH 6/13   WILL FOLLOW UP AS NEEDED PLEASE CALL IF QUESTIONS

## 2018-06-04 NOTE — PROGRESS NOTE ADULT - PROBLEM SELECTOR PROBLEM 2
Abdominal distention
Abdominal aortic aneurysm (AAA) without rupture
Abdominal distention
Abdominal distention

## 2018-06-04 NOTE — PROGRESS NOTE ADULT - PROVIDER SPECIALTY LIST ADULT
Infectious Disease
Palliative Care
Rehab Medicine
SICU
Surgery
Surgery
Vascular Surgery
SICU
Vascular Surgery
SICU
Infectious Disease

## 2018-06-04 NOTE — PROGRESS NOTE ADULT - SUBJECTIVE AND OBJECTIVE BOX
Patient is a 88y old  Male who presents with a chief complaint of cold L leg (25 May 2018 15:12)    Pt is S/P  L AKA c/b UTI and bacteremia  On Ceftriaxone  Doing well. No complaints    Vital Signs Last 24 Hrs  T(C): 36.7 (04 Jun 2018 07:00), Max: 36.9 (03 Jun 2018 23:26)  T(F): 98.1 (04 Jun 2018 07:00), Max: 98.5 (03 Jun 2018 23:26)  HR: 83 (04 Jun 2018 07:00) (76 - 90)  BP: 141/78 (04 Jun 2018 07:00) (141/78 - 173/85)  BP(mean): --  RR: 18 (04 Jun 2018 07:00) (18 - 20)  SpO2: 98% (04 Jun 2018 07:00) (93% - 98%)    MEDICATIONS  (STANDING):  acetaminophen   Tablet. 650 milliGRAM(s) Oral every 6 hours  amLODIPine   Tablet 5 milliGRAM(s) Oral daily  apixaban 2.5 milliGRAM(s) Oral every 12 hours  aspirin  chewable 81 milliGRAM(s) Oral daily  atorvastatin 10 milliGRAM(s) Oral at bedtime  cefTRIAXone Injectable. 1000 milliGRAM(s) IV Push every 24 hours  docusate sodium 100 milliGRAM(s) Oral three times a day  donepezil 10 milliGRAM(s) Oral at bedtime  gabapentin 300 milliGRAM(s) Oral three times a day  lisinopril 5 milliGRAM(s) Oral daily  melatonin 5 milliGRAM(s) Oral at bedtime  pantoprazole    Tablet 40 milliGRAM(s) Oral before breakfast  senna 2 Tablet(s) Oral at bedtime  tamsulosin 0.4 milliGRAM(s) Oral at bedtime    MEDICATIONS  (PRN):  bisacodyl Suppository 10 milliGRAM(s) Rectal daily PRN Constipation    PAST MEDICAL & SURGICAL HISTORY:  DVT (deep venous thrombosis)  Cataract of both eyes  Prostate CA  AAA (abdominal aortic aneurysm)  Alzheimer disease  Hypertension  DVT (deep venous thrombosis)  AAA (abdominal aortic aneurysm): 5cm  Right iliac aneurysm 4cm  Alzheimer disease  Cataract  Prostate ca  Gallstones  History of cataract surgery  H/O prostatectomy  History of cholecystectomy  H/O prostatectomy: 1998  Status post cataract surgery: bilateral  History of cholecystectomy: 1997    Physical Exam:  General: NAD, resting comfortably in bed  Extremities: L AKA incision CDI with staples intact. Post thigh ecchymosis resolving      Assessment:88yMaleHPI:  88 year old male w/PMH of Alzheimers, prostate ca s/p prostatectomy transferred here from [place] with pulselessness, pain and numbness to [facility] that developed around 9pm . Patients family reports a recent diagnosis of DVT to [facility] and started Lovenox / coumadin therapy about 10 days ago with a recent INR of 2.3 a few days ago, however INR - 4.2 on repeat labs here . Lactate uptrending from 2.6 to 4.7 . Pt comes in on cardene gtt for SBP > 200 .   S/P  L AKA c/b UTI and bacteremia_    Plan:  Cont AC and Statin  OOB/PT. Accepted to Acute rehab and is medically stable for DC  Midline placed today for IV abx thru 6/13. Appreciate ID input  DC today if bed available

## 2018-06-04 NOTE — PROCEDURE NOTE - PROCEDURE
<<-----Click on this checkbox to enter Procedure Vascular access with ultrasound guidance  06/04/2018  Patent left brachial vein  Active  JSTEELE  Peripheral insertion of midline catheter  06/04/2018  4FR  12CM BARD POWER MIDLINE   26 circumference  ns flush good heme back left  brachial vein  Active  JSTEELE

## 2018-06-04 NOTE — PROCEDURE NOTE - NSPROCDETAILS_GEN_ALL_CORE
sterile technique, catheter placed/ultrasound guidance/ultrasound assessment/location identified, draped/prepped, sterile technique used/sterile dressing applied/supine position

## 2018-06-04 NOTE — PROGRESS NOTE ADULT - SUBJECTIVE AND OBJECTIVE BOX
[facility]  INFECTIOUS DISEASES AND INTERNAL MEDICINE at [place]  =======================================================  Sina Del Valle MD  Diplomates American Board of Internal Medicine and Infectious Diseases  =======================================================    SOSA PERRY [id number]    Follow up: Sepsis    No complaints  Forgetful       Allergies:  No Known Allergies      Antibiotics:  cefTRIAXone Injectable. 1000 milliGRAM(s) IV Push every 24 hours      REVIEW OF SYSTEMS:  UNRELIABLE, PATIENT IS NOT A RELIABLE HISTORIAN  CONSTITUTIONAL:  No Fever or chills  HEENT:   No diplopia or blurred vision.  No sore throat   CARDIOVASCULAR:  No pressure, squeezing, strangling, tightness, heaviness or aching about the chest, neck, axilla or epigastrium.  RESPIRATORY:  No cough, shortness of breath  GASTROINTESTINAL:  No nausea, vomiting  GENITOURINARY:  No flank pain  MUSCULOSKELETAL:  no joint aches, no muscle pain  SKIN:  No change in skin, hair or nails.  NEUROLOGIC:  No paresthesias, fasciculations  PSYCHIATRIC:  No disorder of thought or mood.  ENDOCRINE:  No heat or cold intolerance  HEMATOLOGICAL:  No easy bruising or bleeding.       Physical Exam:   Vital Signs Last 24 Hrs  T(C): 36.7 (04 Jun 2018 07:00), Max: 36.9 (03 Jun 2018 23:26)  T(F): 98.1 (04 Jun 2018 07:00), Max: 98.5 (03 Jun 2018 23:26)  HR: 83 (04 Jun 2018 07:00) (76 - 90)  BP: 141/78 (04 Jun 2018 07:00) (141/78 - 173/85)  BP(mean): --  RR: 18 (04 Jun 2018 07:00) (18 - 20)  SpO2: 98% (04 Jun 2018 07:00) (93% - 98%)    GEN: NAD, pleasant  HEENT: normocephalic and atraumatic. EOMI. PERRL.    NECK: Supple.   LUNGS: Clear to auscultation.  HEART: Regular rate and rhythm  ABDOMEN: Soft, nontender, and nondistended.  Positive bowel sounds.    : No CVA tenderness  EXTREMITIES: Left AKA  MSK: no joint swelling  NEUROLOGIC: Awake, alert  PSYCHIATRIC: Appropriate affect . Forgetful   SKIN: No Rash      Labs:                         10.7   7.2   )-----------( 393      ( 04 Jun 2018 09:17 )             34.4   06-04    136  |  100  |  11.0  ----------------------------<  110  3.8   |  24.0  |  0.80    Ca    9.0      04 Jun 2018 09:17        RECENT CULTURES:  05-31 @ 05:43 .Blood Blood     No growth at 48 hours      05-29 @ 17:23 .Urine Catheterized Proteus mirabilis    >100,000 CFU/ml Proteus mirabilis      05-29 @ 08:32 .Blood Blood Proteus mirabilis    Growth in anaerobic bottle: Proteus mirabilis  Anaerobic Bottle: 9.35 Hours to positivity  Aerobic Bottle: 11.06 Hours to positivity      05-29 @ 08:31 .Blood Blood Proteus mirabilis  Blood Culture PCR    Growth in anaerobic bottle: Proteus mirabilis  Anaerobic Bottle: 9.16 Hours to positivity  Aerobic Bottle: 11.16 Hours to positivity  ***Blood Panel PCR results on this specimen are available  approximately 3 hours after the Gram stain result.***  Gram stain, PCR, and/or culture results may not always  correspond due to difference in methodologies.  ************************************************************  This PCR assay was performed using [employer].  The following targets are tested for: Enterococcus,  vancomycin resistant enterococci, Listeria monocytogenes,  coagulase negative staphylococci, S. aureus,  methicillin resistant S. aureus, Streptococcus agalactiae  (Group B), S. pneumoniae, S. pyogenes (Group A),  Acinetobacter baumannii, Enterobacter cloacae, E. coli,  Klebsiella oxytoca, K. pneumoniae, Proteus sp.,  Serratia marcescens, Haemophilus influenzae,  Neisseria meningitidis, Pseudomonas aeruginosa, Candida  albicans, C. glabrata, C krusei, C parapsilosis,  C. tropicalis and the KPC resistance gene.  "Due to technical problems, Proteus sp. will Not be reported as part of  the BCID panel until further notice"        EXAM:  CT ABDOMEN AND PELVIS IC                        EXAM:  CT ANGIO CHEST (W)AW IC                        PROCEDURE DATE:  05/29/2018    INTERPRETATION:  HISTORY:  Rapid response. Evaluate for pulmonary emboli   or aortic dissection. Patient also has hypoxia and fever. .  Date/Time of exam: 5/29/2018 2:06 PM  TECHNIQUE:  Sections were obtained from the lung apices to the symphysis   pubis following  intravenous contrast.   The patient was given 120 cc of   omnipaque 350. MIP images were obtained and reviewed.  COMPARISON EXAMINATION:     No prior exam.  FINDINGS:    A small pulmonary embolus is noted in the right middle lobe medial   segmental artery. In addition a segmental pulmonary embolus is noted in   the right upper lobe. Mild respiratory motion artifact precludes   evaluation of lower lobe segmental pulmonary arteries. No evidence of   right heart strain. The RV/LV ratio is less than 1.0.  No evidence of mediastinal or hilar lymphadenopathy. There is a small   right pleural effusion. No evidence of a left pleural effusion. No   evidence of a pericardial effusion. No evidence of axillary adenopathy.  There are atherosclerotic changes in the abdominal aorta and thoracic   aorta without evidence of dissection. There is a 4.8 cm aneurysm of the   abdominal aorta. No evidence of rupture. No evidence of periaortic   hematoma. 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 There is a left common iliac artery aneurysm measuring 3.4 cm.  The left lung is clear. Mild atelectasis at the right lung base.  The liver is unremarkable. Status post cholecystectomy. The spleen is not   enlarged and demonstrates no focal abnormality. The pancreatic contour is   unremarkable without evidence of mass, inflammation or ductal dilatation.   The adrenal glands demonstrate normal size and contour.  The kidneys reveal a normal enhanced morphology.  The celiac artery and superior mesenteric artery are patent. There is a   single renal artery on each side, patent. The inferior mesenteric artery   is obstructed at its origin but reconstitutes after several CM. This   finding is unchanged since 5/20/2018..  No evidence of retroperitoneal lymphadenopathy or hematoma. No evidence   of pelvic lymphadenopathy or hematoma. A small amount of air is noted in   the bladder in keeping with recent instrumentation.  Status post prostatectomy.  Mild diverticulosis of the colon. No evidence of diverticulitis or colitis.  Degenerative changes in the spine.  IMPRESSION:   Exam positive for small pulmonary emboli in the right upper lobe and   right middle lobe. No evidence of right heart strain.  Small right pleural effusion and right basilar atelectasis.  No evidence of aortic dissection. Abdominal aortic aneurysm as well as   bilateral common iliac artery aneurysms.  Diverticulosis without evidence of diverticulitis.

## 2018-06-04 NOTE — PROGRESS NOTE ADULT - PROBLEM SELECTOR PLAN 4
s/p Left AKA 5/21/18  RLE duplex negative for DVT

## 2018-06-04 NOTE — PROGRESS NOTE ADULT - SUBJECTIVE AND OBJECTIVE BOX
Patient in bed, smiling.   Comfortable.   Reports no pain. Does not feel his left leg this AM.  Continues to be confused about ongoing events.   Planned for central line for IV Abx.     FUNCTIONAL PROGRESS  6/1  Bed Mobility  Bed Mobility Training Sit-to-Supine: maximum assist (25% patient effort);  2 person assist;  verbal cues  Bed Mobility Training Supine-to-Sit: maximum assist (25% patient effort);  2 person assist;  verbal cues  Bed Mobility Training Limitations: decreased ability to use arms for pushing/pulling;  decreased ability to use legs for bridging/pushing;  impaired ability to control trunk for mobility;  decreased flexibility;  decreased strength;  impaired balance    Therapeutic Exercise  Therapeutic Exercise Detail: right heel slides, bilateral hip abd in supine, left straight leg raises (all 10x each)     REVIEW OF SYSTEMS  Constitutional - No fever,  No fatigue  Neurological - No headaches, +memory loss, +loss of strength   Skin - No rashes, +lesions   Musculoskeletal - No joint pain, +joint swelling, No muscle pain  Psychiatric - No depression, No anxiety    VITALS  T(C): 36.7 (06-04-18 @ 07:00), Max: 36.9 (06-03-18 @ 23:26)  HR: 83 (06-04-18 @ 07:00) (76 - 90)  BP: 141/78 (06-04-18 @ 07:00) (141/78 - 173/85)  RR: 18 (06-04-18 @ 07:00) (18 - 20)  SpO2: 98% (06-04-18 @ 07:00) (93% - 98%)  Wt(kg): --    MEDICATIONS   acetaminophen   Tablet. 650 milliGRAM(s) every 6 hours  amLODIPine   Tablet 5 milliGRAM(s) daily  apixaban 2.5 milliGRAM(s) every 12 hours  aspirin  chewable 81 milliGRAM(s) daily  atorvastatin 10 milliGRAM(s) at bedtime  bisacodyl Suppository 10 milliGRAM(s) daily PRN  cefTRIAXone Injectable. 1000 milliGRAM(s) every 24 hours  docusate sodium 100 milliGRAM(s) three times a day  donepezil 10 milliGRAM(s) at bedtime  gabapentin 300 milliGRAM(s) three times a day  lisinopril 5 milliGRAM(s) daily  melatonin 5 milliGRAM(s) at bedtime  pantoprazole    Tablet 40 milliGRAM(s) before breakfast  senna 2 Tablet(s) at bedtime  tamsulosin 0.4 milliGRAM(s) at bedtime        ------------------------------------------------------------  PHYSICAL EXAM  Constitutional - NAD, Comfortable  Extremities - Right AKA, incision - CDI, Staples, Swollen, Pain to palpation  Neurologic Exam -                    Cognitive - Awake, alert, oriented to self, hospital     Motor -   Focal deficits to the BUE SF - proximal weakness, BLE - right HF 2/5, Left LE HF 1/5    Psychiatric - Mood is stable  ----------------------------------------------------------------------------------------  ASSESSMENT/PLAN  88yMale with functional deficits after left AKA   Pain - Tylenol  Phantom pain and sensation - Neurontin 300mg TID	  PEs - SCDs, Eliquis	  CAD - ASA  Bacteremia/Urosepsis - Rocephin  Rehab - Recommend ACUTE inpatient rehabilitation for the functional deficits consisting of 3 hours of therapy/day & 24 hour RN/daily PMR physician for comorbid medical management. Will continue to follow for ongoing rehab needs and recommendations. Patient will be able to tolerate 3 hours a day. Despite cognitive status, patient was independent prior. Do not expect patient to have a prosthesis long term, but short term would need rehab to assist with transfers for  as his primary mode of ambulation.

## 2018-06-04 NOTE — PROCEDURE NOTE - NSPOSTCAREGUIDE_GEN_A_CORE
Care for catheter as per unit/ICU protocols/Verbal/written post procedure instructions were given to patient/caregiver/Instructed patient/caregiver to follow-up with primary care physician/Instructed patient/caregiver regarding signs and symptoms of infection/Keep the cast/splint/dressing clean and dry

## 2018-06-04 NOTE — PROGRESS NOTE ADULT - PROBLEM SELECTOR PLAN 1
Blood cultures with Proteus mirabilis   Urine culture with Proteus mirabilis   Repeat blood cultures no growth 5/31/18  Sensitive to Ceftriaxone  Continue Ceftriaxone 1gm IV [id number]  CT A/P with no acute findings  Afebrile  Leukocytosis trending down  Will need 2 weeks of antibiotics till 6/13/18   MID LINE IN PLACE

## 2018-06-04 NOTE — PROGRESS NOTE ADULT - PROBLEM SELECTOR PLAN 2
Resolved  CT A/P with no acute findings
Will continue discussion with family about possible future treatment FU and intervention.

## 2018-06-04 NOTE — PROGRESS NOTE ADULT - PROBLEM SELECTOR PROBLEM 1
Sepsis due to Gram negative bacteria
Limb ischemia
Sepsis due to Gram negative bacteria

## 2018-06-05 PROCEDURE — 93010 ELECTROCARDIOGRAM REPORT: CPT

## 2018-06-05 PROCEDURE — 99222 1ST HOSP IP/OBS MODERATE 55: CPT | Mod: AI

## 2018-06-05 PROCEDURE — 99223 1ST HOSP IP/OBS HIGH 75: CPT

## 2018-06-06 LAB
CULTURE RESULTS: SIGNIFICANT CHANGE UP
CULTURE RESULTS: SIGNIFICANT CHANGE UP
SPECIMEN SOURCE: SIGNIFICANT CHANGE UP
SPECIMEN SOURCE: SIGNIFICANT CHANGE UP

## 2018-06-06 PROCEDURE — 99232 SBSQ HOSP IP/OBS MODERATE 35: CPT

## 2018-06-06 PROCEDURE — 99233 SBSQ HOSP IP/OBS HIGH 50: CPT

## 2018-06-07 DIAGNOSIS — Z89.612 ACQUIRED ABSENCE OF LEFT LEG ABOVE KNEE: ICD-10-CM

## 2018-06-07 LAB
CULTURE RESULTS: SIGNIFICANT CHANGE UP
SPECIMEN SOURCE: SIGNIFICANT CHANGE UP
SURGICAL PATHOLOGY FINAL REPORT - CH: SIGNIFICANT CHANGE UP

## 2018-06-07 PROCEDURE — 99232 SBSQ HOSP IP/OBS MODERATE 35: CPT

## 2018-06-08 PROCEDURE — 99232 SBSQ HOSP IP/OBS MODERATE 35: CPT | Mod: GC

## 2018-06-09 PROCEDURE — 99232 SBSQ HOSP IP/OBS MODERATE 35: CPT

## 2018-06-10 PROCEDURE — 99232 SBSQ HOSP IP/OBS MODERATE 35: CPT

## 2018-06-11 PROCEDURE — 99232 SBSQ HOSP IP/OBS MODERATE 35: CPT | Mod: GC

## 2018-06-11 PROCEDURE — 99233 SBSQ HOSP IP/OBS HIGH 50: CPT

## 2018-06-12 PROCEDURE — 71045 X-RAY EXAM CHEST 1 VIEW: CPT | Mod: 26

## 2018-06-12 PROCEDURE — 99232 SBSQ HOSP IP/OBS MODERATE 35: CPT | Mod: GC

## 2018-06-13 PROCEDURE — 99232 SBSQ HOSP IP/OBS MODERATE 35: CPT

## 2018-06-13 PROCEDURE — 99233 SBSQ HOSP IP/OBS HIGH 50: CPT

## 2018-06-14 PROCEDURE — 99233 SBSQ HOSP IP/OBS HIGH 50: CPT

## 2018-06-14 PROCEDURE — 99232 SBSQ HOSP IP/OBS MODERATE 35: CPT | Mod: GC

## 2018-06-15 PROCEDURE — 99232 SBSQ HOSP IP/OBS MODERATE 35: CPT

## 2018-06-15 PROCEDURE — 99233 SBSQ HOSP IP/OBS HIGH 50: CPT

## 2018-06-16 VITALS — WEIGHT: 188.05 LBS | HEIGHT: 72.05 IN

## 2018-06-16 RX ORDER — SENNA PLUS 8.6 MG/1
2 TABLET ORAL AT BEDTIME
Qty: 0 | Refills: 0 | Status: DISCONTINUED | OUTPATIENT
Start: 2018-06-16 | End: 2018-06-23

## 2018-06-16 RX ORDER — ACETAMINOPHEN 500 MG
650 TABLET ORAL EVERY 6 HOURS
Qty: 0 | Refills: 0 | Status: DISCONTINUED | OUTPATIENT
Start: 2018-06-16 | End: 2018-06-23

## 2018-06-16 RX ORDER — ATORVASTATIN CALCIUM 80 MG/1
10 TABLET, FILM COATED ORAL AT BEDTIME
Qty: 0 | Refills: 0 | Status: DISCONTINUED | OUTPATIENT
Start: 2018-06-16 | End: 2018-06-23

## 2018-06-16 RX ORDER — BACITRACIN ZINC 500 UNIT/G
1 OINTMENT IN PACKET (EA) TOPICAL DAILY
Qty: 0 | Refills: 0 | Status: DISCONTINUED | OUTPATIENT
Start: 2018-06-16 | End: 2018-06-23

## 2018-06-16 RX ORDER — NYSTATIN CREAM 100000 [USP'U]/G
1 CREAM TOPICAL
Qty: 0 | Refills: 0 | Status: DISCONTINUED | OUTPATIENT
Start: 2018-06-16 | End: 2018-06-23

## 2018-06-16 RX ORDER — POLYETHYLENE GLYCOL 3350 17 G/17G
17 POWDER, FOR SOLUTION ORAL DAILY
Qty: 0 | Refills: 0 | Status: DISCONTINUED | OUTPATIENT
Start: 2018-06-16 | End: 2018-06-23

## 2018-06-16 RX ORDER — ASPIRIN/CALCIUM CARB/MAGNESIUM 324 MG
81 TABLET ORAL DAILY
Qty: 0 | Refills: 0 | Status: DISCONTINUED | OUTPATIENT
Start: 2018-06-16 | End: 2018-06-23

## 2018-06-16 RX ORDER — AMLODIPINE BESYLATE 2.5 MG/1
10 TABLET ORAL DAILY
Qty: 0 | Refills: 0 | Status: DISCONTINUED | OUTPATIENT
Start: 2018-06-16 | End: 2018-06-23

## 2018-06-16 RX ORDER — PANTOPRAZOLE SODIUM 20 MG/1
40 TABLET, DELAYED RELEASE ORAL DAILY
Qty: 0 | Refills: 0 | Status: DISCONTINUED | OUTPATIENT
Start: 2018-06-16 | End: 2018-06-23

## 2018-06-16 RX ORDER — APIXABAN 2.5 MG/1
2.5 TABLET, FILM COATED ORAL EVERY 12 HOURS
Qty: 0 | Refills: 0 | Status: DISCONTINUED | OUTPATIENT
Start: 2018-06-16 | End: 2018-06-23

## 2018-06-16 RX ORDER — LISINOPRIL 2.5 MG/1
5 TABLET ORAL DAILY
Qty: 0 | Refills: 0 | Status: DISCONTINUED | OUTPATIENT
Start: 2018-06-16 | End: 2018-06-23

## 2018-06-16 RX ORDER — DONEPEZIL HYDROCHLORIDE 10 MG/1
10 TABLET, FILM COATED ORAL AT BEDTIME
Qty: 0 | Refills: 0 | Status: DISCONTINUED | OUTPATIENT
Start: 2018-06-16 | End: 2018-06-23

## 2018-06-16 RX ORDER — LANOLIN ALCOHOL/MO/W.PET/CERES
6 CREAM (GRAM) TOPICAL AT BEDTIME
Qty: 0 | Refills: 0 | Status: DISCONTINUED | OUTPATIENT
Start: 2018-06-16 | End: 2018-06-23

## 2018-06-16 RX ORDER — MULTIVIT-MIN/FERROUS GLUCONATE 9 MG/15 ML
1 LIQUID (ML) ORAL DAILY
Qty: 0 | Refills: 0 | Status: DISCONTINUED | OUTPATIENT
Start: 2018-06-16 | End: 2018-06-18

## 2018-06-16 RX ORDER — TAMSULOSIN HYDROCHLORIDE 0.4 MG/1
0.4 CAPSULE ORAL AT BEDTIME
Qty: 0 | Refills: 0 | Status: DISCONTINUED | OUTPATIENT
Start: 2018-06-16 | End: 2018-06-23

## 2018-06-16 RX ORDER — ASCORBIC ACID 60 MG
500 TABLET,CHEWABLE ORAL DAILY
Qty: 0 | Refills: 0 | Status: DISCONTINUED | OUTPATIENT
Start: 2018-06-16 | End: 2018-06-23

## 2018-06-16 RX ADMIN — DONEPEZIL HYDROCHLORIDE 10 MILLIGRAM(S): 10 TABLET, FILM COATED ORAL at 22:11

## 2018-06-16 RX ADMIN — ATORVASTATIN CALCIUM 10 MILLIGRAM(S): 80 TABLET, FILM COATED ORAL at 22:10

## 2018-06-16 RX ADMIN — Medication 6 MILLIGRAM(S): at 22:12

## 2018-06-16 RX ADMIN — SENNA PLUS 2 TABLET(S): 8.6 TABLET ORAL at 22:12

## 2018-06-16 RX ADMIN — TAMSULOSIN HYDROCHLORIDE 0.4 MILLIGRAM(S): 0.4 CAPSULE ORAL at 22:13

## 2018-06-16 RX ADMIN — APIXABAN 2.5 MILLIGRAM(S): 2.5 TABLET, FILM COATED ORAL at 17:51

## 2018-06-17 PROCEDURE — 99232 SBSQ HOSP IP/OBS MODERATE 35: CPT

## 2018-06-17 RX ADMIN — NYSTATIN CREAM 1 APPLICATION(S): 100000 CREAM TOPICAL at 06:01

## 2018-06-17 RX ADMIN — Medication 500 MILLIGRAM(S): at 13:40

## 2018-06-17 RX ADMIN — TAMSULOSIN HYDROCHLORIDE 0.4 MILLIGRAM(S): 0.4 CAPSULE ORAL at 21:31

## 2018-06-17 RX ADMIN — DONEPEZIL HYDROCHLORIDE 10 MILLIGRAM(S): 10 TABLET, FILM COATED ORAL at 21:29

## 2018-06-17 RX ADMIN — Medication 81 MILLIGRAM(S): at 13:41

## 2018-06-17 RX ADMIN — APIXABAN 2.5 MILLIGRAM(S): 2.5 TABLET, FILM COATED ORAL at 18:28

## 2018-06-17 RX ADMIN — AMLODIPINE BESYLATE 10 MILLIGRAM(S): 2.5 TABLET ORAL at 06:02

## 2018-06-17 RX ADMIN — NYSTATIN CREAM 1 APPLICATION(S): 100000 CREAM TOPICAL at 18:28

## 2018-06-17 RX ADMIN — ATORVASTATIN CALCIUM 10 MILLIGRAM(S): 80 TABLET, FILM COATED ORAL at 21:29

## 2018-06-17 RX ADMIN — Medication 6 MILLIGRAM(S): at 21:30

## 2018-06-17 RX ADMIN — PANTOPRAZOLE SODIUM 40 MILLIGRAM(S): 20 TABLET, DELAYED RELEASE ORAL at 13:41

## 2018-06-17 RX ADMIN — Medication 1 APPLICATION(S): at 13:42

## 2018-06-17 RX ADMIN — APIXABAN 2.5 MILLIGRAM(S): 2.5 TABLET, FILM COATED ORAL at 06:02

## 2018-06-17 RX ADMIN — SENNA PLUS 2 TABLET(S): 8.6 TABLET ORAL at 21:30

## 2018-06-17 RX ADMIN — Medication 1 TABLET(S): at 13:42

## 2018-06-17 RX ADMIN — LISINOPRIL 5 MILLIGRAM(S): 2.5 TABLET ORAL at 06:03

## 2018-06-17 NOTE — PROGRESS NOTE ADULT - SUBJECTIVE AND OBJECTIVE BOX
SOSA PERRY  88y  Male    Patient is a 88y old  Male who presents with a chief complaint of s/p left AKA ccb HTN/anemia/urinary retention/ileus/sepsis sec to uti/bacteremia(completed CTX)/PE  Subjective  No  complaints.         PAST MEDICAL & SURGICAL HISTORY:  DVT (deep venous thrombosis)  Cataract of both eyes  Prostate CA  AAA (abdominal aortic aneurysm)  Alzheimer disease  Hypertension  DVT (deep venous thrombosis)  AAA (abdominal aortic aneurysm): 5cm  Right iliac aneurysm 4cm  Alzheimer disease  Cataract  Prostate ca  Gallstones  History of cataract surgery  H/O prostatectomy  History of cholecystectomy  H/O prostatectomy: 1998  Status post cataract surgery: bilateral  History of cholecystectomy: 1997      MedsMEDICATIONS  (STANDING):  amLODIPine   Tablet 10 milliGRAM(s) Oral daily  apixaban 2.5 milliGRAM(s) Oral every 12 hours  ascorbic acid 500 milliGRAM(s) Oral daily  aspirin  chewable 81 milliGRAM(s) Oral daily  atorvastatin 10 milliGRAM(s) Oral at bedtime  BACItracin   Ointment 1 Application(s) Topical daily  donepezil 10 milliGRAM(s) Oral at bedtime  lisinopril 5 milliGRAM(s) Oral daily  melatonin 6 milliGRAM(s) Oral at bedtime  multivitamin/minerals 1 Tablet(s) Oral daily  nystatin Powder 1 Application(s) Topical two times a day  pantoprazole   Suspension 40 milliGRAM(s) Oral daily  senna 2 Tablet(s) Oral at bedtime  tamsulosin 0.4 milliGRAM(s) Oral at bedtime    MEDICATIONS  (PRN):  acetaminophen   Tablet. 650 milliGRAM(s) Oral every 6 hours PRN Mild Pain (1 - 3)  polyethylene glycol 3350 17 Gram(s) Oral daily PRN Constipation      Vital Signs Last 24 Hrs  T(C): 36.7 (17 Jun 2018 08:20), Max: 37.2 (16 Jun 2018 20:41)  T(F): 98.1 (17 Jun 2018 08:20), Max: 99 (16 Jun 2018 20:41)  HR: 80 (17 Jun 2018 08:20) (80 - 99)  BP: 127/73 (17 Jun 2018 08:20) (100/61 - 143/71)  BP(mean): --  RR: 14 (17 Jun 2018 08:20) (14 - 14)  SpO2: 65% (16 Jun 2018 20:41) (65% - 65%)  I&O's Summary      PHYSICAL EXAM:  GENERAL: NAD  NECK: Supple  NERVOUS SYSTEM:  awake and alert  HEART: S1s2 NL , RRR  CHEST/LUNG: Clear to percussion bilaterally  ABDOMEN: Soft, Nontender, Nondistended; Bowel sounds present  EXTREMITIES:  No edema      LABS:  CAPILLARY BLOOD GLUCOSE        LABS:              RADIOLOGY & ADDITIONAL TESTS:      Imaging Personally Reviewed:  [ ] YES  [ ] NO      HEALTH ISSUES - PROBLEM Dx:    87 y/o s/p Left AKA-PT/OT per Rehab.     HTN-well controlled. Cont norvasc/lisinopril    Cough with ATX-OOB, IS    DVT/PE-cont Eliquis      Care Discussed with Consultants/Other Providers [ x] YES  [ ] NO

## 2018-06-17 NOTE — PROGRESS NOTE ADULT - SUBJECTIVE AND OBJECTIVE BOX
History of Present Illness:   88M s/p left AKA on 5/21 with Dr. Fry for acute limb ischemia 2/2 arterial occlusion. Post-op rhabdomyolysis,  acute blood loss anemia, urosepsis (on IV abx until 6/13), urinary retention, ileus and PE started on eliquis, bilateral LE duplex negative for DVT (+right popliteal aneurysm).      Subjective:   Patient seen in dining room. No acute issues in past 24 hrs. Patient with no acute complaints this AM.     Objective:   Vital Signs Last 24 Hrs  T(C): 36.7 (17 Jun 2018 08:20), Max: 37.2 (16 Jun 2018 20:41)  T(F): 98.1 (17 Jun 2018 08:20), Max: 99 (16 Jun 2018 20:41)  HR: 80 (17 Jun 2018 08:20) (80 - 99)  BP: 127/73 (17 Jun 2018 08:20) (100/61 - 143/71)  BP(mean): --  ABP: --  ABP(mean): --  RR: 14 (17 Jun 2018 08:20) (14 - 14)  SpO2: 65% (16 Jun 2018 20:41) (65% - 65%)  Gen: NAD, Pleasant affect  CV: RRR,S1/S2 audible, no m/r/g  Resp: CTAB, no w/r/c  Abd: Soft, NT, ND, + BS  Ext: c/c/e    MEDICATIONS  (STANDING):  amLODIPine   Tablet 10 milliGRAM(s) Oral daily  apixaban 2.5 milliGRAM(s) Oral every 12 hours  ascorbic acid 500 milliGRAM(s) Oral daily  aspirin  chewable 81 milliGRAM(s) Oral daily  atorvastatin 10 milliGRAM(s) Oral at bedtime  BACItracin   Ointment 1 Application(s) Topical daily  donepezil 10 milliGRAM(s) Oral at bedtime  lisinopril 5 milliGRAM(s) Oral daily  melatonin 6 milliGRAM(s) Oral at bedtime  multivitamin/minerals 1 Tablet(s) Oral daily  nystatin Powder 1 Application(s) Topical two times a day  pantoprazole   Suspension 40 milliGRAM(s) Oral daily  senna 2 Tablet(s) Oral at bedtime  tamsulosin 0.4 milliGRAM(s) Oral at bedtime    MEDICATIONS  (PRN):  acetaminophen   Tablet. 650 milliGRAM(s) Oral every 6 hours PRN Mild Pain (1 - 3)  polyethylene glycol 3350 17 Gram(s) Oral daily PRN Constipation        Assessment/Plan:   88M s/p left AKA for arterial occlusion with functional impairments.     Left AKA-Staples removed as per surgeons request.   provided 6/15    HTN - ASA,  Lisinopril,  increased norvasc to 10mg daily 6/11    PE - Eliquis    Insomnia - Melatonin    Dementia - Dozepazil, enhanced supervision, bed/chair alarm, fall precautions     Urosepsis, bacteremia - Ceftriaxone completed on 6/13, DCd florastor     Cough - CXR  noted.  Encourage incentive spirometry    Skin: Turn Q2, warm pack prn to right medial calf hematoma, bacitracin to skin tear    DVT PPX; Eliquis

## 2018-06-18 DIAGNOSIS — R05 COUGH: ICD-10-CM

## 2018-06-18 DIAGNOSIS — I10 ESSENTIAL (PRIMARY) HYPERTENSION: ICD-10-CM

## 2018-06-18 DIAGNOSIS — Z89.612 ACQUIRED ABSENCE OF LEFT LEG ABOVE KNEE: ICD-10-CM

## 2018-06-18 DIAGNOSIS — I82.509 CHRONIC EMBOLISM AND THROMBOSIS OF UNSPECIFIED DEEP VEINS OF UNSPECIFIED LOWER EXTREMITY: ICD-10-CM

## 2018-06-18 PROCEDURE — 99232 SBSQ HOSP IP/OBS MODERATE 35: CPT | Mod: GC

## 2018-06-18 PROCEDURE — 99233 SBSQ HOSP IP/OBS HIGH 50: CPT

## 2018-06-18 RX ADMIN — Medication 81 MILLIGRAM(S): at 13:06

## 2018-06-18 RX ADMIN — DONEPEZIL HYDROCHLORIDE 10 MILLIGRAM(S): 10 TABLET, FILM COATED ORAL at 21:56

## 2018-06-18 RX ADMIN — Medication 500 MILLIGRAM(S): at 13:05

## 2018-06-18 RX ADMIN — APIXABAN 2.5 MILLIGRAM(S): 2.5 TABLET, FILM COATED ORAL at 06:17

## 2018-06-18 RX ADMIN — TAMSULOSIN HYDROCHLORIDE 0.4 MILLIGRAM(S): 0.4 CAPSULE ORAL at 21:56

## 2018-06-18 RX ADMIN — Medication 1 APPLICATION(S): at 18:19

## 2018-06-18 RX ADMIN — PANTOPRAZOLE SODIUM 40 MILLIGRAM(S): 20 TABLET, DELAYED RELEASE ORAL at 13:06

## 2018-06-18 RX ADMIN — AMLODIPINE BESYLATE 10 MILLIGRAM(S): 2.5 TABLET ORAL at 06:17

## 2018-06-18 RX ADMIN — SENNA PLUS 2 TABLET(S): 8.6 TABLET ORAL at 21:56

## 2018-06-18 RX ADMIN — LISINOPRIL 5 MILLIGRAM(S): 2.5 TABLET ORAL at 06:17

## 2018-06-18 RX ADMIN — ATORVASTATIN CALCIUM 10 MILLIGRAM(S): 80 TABLET, FILM COATED ORAL at 21:56

## 2018-06-18 RX ADMIN — NYSTATIN CREAM 1 APPLICATION(S): 100000 CREAM TOPICAL at 06:15

## 2018-06-18 RX ADMIN — Medication 6 MILLIGRAM(S): at 21:56

## 2018-06-18 RX ADMIN — APIXABAN 2.5 MILLIGRAM(S): 2.5 TABLET, FILM COATED ORAL at 18:19

## 2018-06-18 RX ADMIN — NYSTATIN CREAM 1 APPLICATION(S): 100000 CREAM TOPICAL at 18:19

## 2018-06-18 NOTE — PROGRESS NOTE ADULT - SUBJECTIVE AND OBJECTIVE BOX
History of Present Illness:   88M s/p left AKA on 5/21 with Dr. Fry for acute limb ischemia 2/2 arterial occlusion. Post-op rhabdomyolysis,  acute blood loss anemia, urosepsis (on IV abx until 6/13), urinary retention, ileus and PE started on eliquis, bilateral LE duplex negative for DVT (+right popliteal aneurysm).      Subjective:   Patient seen and examined. No acute overnight events. Slept well. Denies pain. Unsure when last BM was.     [X ] Constitutional WNL  [X ] Cardio WNL   [X ]  Resp WNL   [X ] GI WNL   [ X]  WNL  [X ]  Heme WNL   [X ] Endo WNL  [X ]  Skin WNL  [ ]  MSK WNL  [ ] Neuro WNL  [ ] Cog WNL  [ X] Psych WNL    Vital Signs Last 24 Hrs  T(C): 36.9 (18 Jun 2018 10:11), Max: 37.1 (17 Jun 2018 20:25)  T(F): 98.4 (18 Jun 2018 10:11), Max: 98.7 (17 Jun 2018 20:25)  HR: 82 (18 Jun 2018 10:11) (82 - 95)  BP: 119/69 (18 Jun 2018 10:11) (119/69 - 139/72)  BP(mean): --  RR: 14 (18 Jun 2018 10:11) (14 - 14)  SpO2: 96% (18 Jun 2018 10:11) (94% - 96%)Vital Signs Last 24 Hrs    PHYSICAL EXAM  Gen- NAD, Pleasant affect  CV- RRR,S1/S2   Resp- CTAB, no w/r/c  Abd- Soft, NT, ND  Neuro - confused, oriented to self   Motor - Right HF 3/5, left HF 4/5  Ext- Left residual limb with    Wounds -Left residual limb with steristrips - CDI  Skin - Left forearm skin tear    MEDICATIONS  (STANDING):  amLODIPine   Tablet 10 milliGRAM(s) Oral daily  apixaban 2.5 milliGRAM(s) Oral every 12 hours  ascorbic acid 500 milliGRAM(s) Oral daily  aspirin  chewable 81 milliGRAM(s) Oral daily  atorvastatin 10 milliGRAM(s) Oral at bedtime  BACItracin   Ointment 1 Application(s) Topical daily  donepezil 10 milliGRAM(s) Oral at bedtime  lisinopril 5 milliGRAM(s) Oral daily  melatonin 6 milliGRAM(s) Oral at bedtime  multivitamin/minerals 1 Tablet(s) Oral daily  nystatin Powder 1 Application(s) Topical two times a day  pantoprazole   Suspension 40 milliGRAM(s) Oral daily  senna 2 Tablet(s) Oral at bedtime  tamsulosin 0.4 milliGRAM(s) Oral at bedtime    MEDICATIONS  (PRN):  acetaminophen   Tablet. 650 milliGRAM(s) Oral every 6 hours PRN Mild Pain (1 - 3)  polyethylene glycol 3350 17 Gram(s) Oral daily PRN Constipation    Assessment/Plan:   88M s/p left AKA for arterial occlusion with functional impairments.     Left AKA-  provided 6/15    HTN - ASA,  Lisinopril, norvasc    HLD - Lipitor    PE - Eliquis    Insomnia - Melatonin    Dementia - Dozepazil, enhanced supervision, bed/chair alarm, fall precautions     Urosepsis, bacteremia - Ceftriaxone completed on 6/13, DC midline      Cough - resolved     Pain - Tylenol PRN    Skin: Turn Q2, warm pack prn to right medial calf hematoma, bacitracin to skin tear, nystatin powder     Bladder -  Flomax    Bowels: Senna QHS, Miralax PRN    Supplements - Vitamin C, MVI    GI PPX: Protonix    DVT PPX; Eliquis History of Present Illness:   88M s/p left AKA on 5/21 with Dr. Fry for acute limb ischemia 2/2 arterial occlusion. Post-op rhabdomyolysis,  acute blood loss anemia, urosepsis (on IV abx until 6/13), urinary retention, ileus and PE started on eliquis, bilateral LE duplex negative for DVT (+right popliteal aneurysm).      Subjective:   Patient seen and examined. No acute overnight events. Slept well. Denies pain. Unsure when last BM was.     [X ] Constitutional WNL  [X ] Cardio WNL   [X ]  Resp WNL   [X ] GI WNL   [ X]  WNL  [X ]  Heme WNL   [X ] Endo WNL  [X ]  Skin WNL  [ ]  MSK WNL  [ ] Neuro WNL  [ ] Cog WNL  [ X] Psych WNL    Vital Signs Last 24 Hrs  T(C): 36.9 (18 Jun 2018 10:11), Max: 37.1 (17 Jun 2018 20:25)  T(F): 98.4 (18 Jun 2018 10:11), Max: 98.7 (17 Jun 2018 20:25)  HR: 82 (18 Jun 2018 10:11) (82 - 95)  BP: 119/69 (18 Jun 2018 10:11) (119/69 - 139/72)  BP(mean): --  RR: 14 (18 Jun 2018 10:11) (14 - 14)  SpO2: 96% (18 Jun 2018 10:11) (94% - 96%)Vital Signs Last 24 Hrs    PHYSICAL EXAM  Gen- NAD, Pleasant affect  CV- RRR,S1/S2   Resp- CTAB, no w/r/c  Abd- Soft, NT, ND  Neuro - confused, oriented to self   Motor - Right HF 3/5, left HF 4/5  Ext- Left residual limb with    Wounds -Left residual limb with steristrips - CDI  Skin - Left forearm skin tear    MEDICATIONS  (STANDING):  amLODIPine   Tablet 10 milliGRAM(s) Oral daily  apixaban 2.5 milliGRAM(s) Oral every 12 hours  ascorbic acid 500 milliGRAM(s) Oral daily  aspirin  chewable 81 milliGRAM(s) Oral daily  atorvastatin 10 milliGRAM(s) Oral at bedtime  BACItracin   Ointment 1 Application(s) Topical daily  donepezil 10 milliGRAM(s) Oral at bedtime  lisinopril 5 milliGRAM(s) Oral daily  melatonin 6 milliGRAM(s) Oral at bedtime  multivitamin/minerals 1 Tablet(s) Oral daily  nystatin Powder 1 Application(s) Topical two times a day  pantoprazole   Suspension 40 milliGRAM(s) Oral daily  senna 2 Tablet(s) Oral at bedtime  tamsulosin 0.4 milliGRAM(s) Oral at bedtime    MEDICATIONS  (PRN):  acetaminophen   Tablet. 650 milliGRAM(s) Oral every 6 hours PRN Mild Pain (1 - 3)  polyethylene glycol 3350 17 Gram(s) Oral daily PRN Constipation    Functional Progress:  Gait: Min assist WC mobility 60'  Transfer: max assist sit-stand  ADL: Mod assist UE dressing  Functional transfers: Max assist      Assessment/Plan:   88M s/p left AKA for arterial occlusion with functional impairments.     Left AKA-  provided 6/15    HTN - ASA,  Lisinopril, norvasc    HLD - Lipitor    PE - Eliquis    Insomnia - Melatonin    Dementia - Dozepazil, enhanced supervision, bed/chair alarm, fall precautions     Urosepsis, bacteremia - Ceftriaxone completed on 6/13, DC midline      Cough - resolved     Pain - Tylenol PRN    Skin: Turn Q2, warm pack prn to right medial calf hematoma, bacitracin to skin tear, nystatin powder     Bladder -  Flomax    Bowels: Senna QHS, Miralax PRN    Supplements - Vitamin C, MVI    GI PPX: Protonix    DVT PPX; Eliquis History of Present Illness:   88M s/p left AKA on 5/21 with Dr. Fry for acute limb ischemia 2/2 arterial occlusion. Post-op rhabdomyolysis,  acute blood loss anemia, urosepsis (on IV abx until 6/13), urinary retention, ileus and PE started on eliquis, bilateral LE duplex negative for DVT (+right popliteal aneurysm).      Subjective:   Patient seen and examined. No acute overnight events. Slept well. Denies pain. Unsure when last BM was.     [X ] Constitutional WNL  [X ] Cardio WNL   [X ]  Resp WNL   [X ] GI WNL   [ X]  WNL  [X ]  Heme WNL   [X ] Endo WNL  [X ]  Skin WNL  [ ]  MSK WNL  [ ] Neuro WNL  [ ] Cog WNL  [ X] Psych WNL    Vital Signs Last 24 Hrs  T(C): 36.9 (18 Jun 2018 10:11), Max: 37.1 (17 Jun 2018 20:25)  T(F): 98.4 (18 Jun 2018 10:11), Max: 98.7 (17 Jun 2018 20:25)  HR: 82 (18 Jun 2018 10:11) (82 - 95)  BP: 119/69 (18 Jun 2018 10:11) (119/69 - 139/72)  BP(mean): --  RR: 14 (18 Jun 2018 10:11) (14 - 14)  SpO2: 96% (18 Jun 2018 10:11) (94% - 96%)Vital Signs Last 24 Hrs    PHYSICAL EXAM  Gen- NAD, Pleasant affect  CV- RRR,S1/S2   Resp- CTAB, no w/r/c  Abd- Soft, NT, ND  Neuro - confused, oriented to self   Motor - Right HF 3/5, left HF 4/5  Ext- Left residual limb with    Wounds -Left residual limb with steristrips - CDI  Skin - Left forearm skin tear    MEDICATIONS  (STANDING):  amLODIPine   Tablet 10 milliGRAM(s) Oral daily  apixaban 2.5 milliGRAM(s) Oral every 12 hours  ascorbic acid 500 milliGRAM(s) Oral daily  aspirin  chewable 81 milliGRAM(s) Oral daily  atorvastatin 10 milliGRAM(s) Oral at bedtime  BACItracin   Ointment 1 Application(s) Topical daily  donepezil 10 milliGRAM(s) Oral at bedtime  lisinopril 5 milliGRAM(s) Oral daily  melatonin 6 milliGRAM(s) Oral at bedtime  multivitamin/minerals 1 Tablet(s) Oral daily  nystatin Powder 1 Application(s) Topical two times a day  pantoprazole   Suspension 40 milliGRAM(s) Oral daily  senna 2 Tablet(s) Oral at bedtime  tamsulosin 0.4 milliGRAM(s) Oral at bedtime    MEDICATIONS  (PRN):  acetaminophen   Tablet. 650 milliGRAM(s) Oral every 6 hours PRN Mild Pain (1 - 3)  polyethylene glycol 3350 17 Gram(s) Oral daily PRN Constipation    Functional Progress:  Gait: Min assist WC mobility 60'  Transfer: max assist sit-stand  ADL: Mod assist UE dressing  Functional transfers: Max assist      Assessment/Plan:   88M s/p left AKA for arterial occlusion with functional impairments.     Left AKA-  provided 6/15    HTN - ASA,  Lisinopril, norvasc    HLD - Lipitor    PE - Eliquis    Insomnia - Melatonin    Dementia - Donezepil, enhanced supervision, bed/chair alarm, fall precautions     Urosepsis, bacteremia - Ceftriaxone completed on 6/13, DC midline      Cough - resolved     Pain - Tylenol PRN    Skin: Turn Q2, warm pack prn to right medial calf hematoma, bacitracin to skin tear, nystatin powder     Bladder -  Flomax    Bowels: Senna QHS, Miralax PRN    Supplements - Vitamin C, MVI    GI PPX: Protonix    DVT PPX; Eliquis

## 2018-06-18 NOTE — PROGRESS NOTE ADULT - SUBJECTIVE AND OBJECTIVE BOX
Patient is a 88y old  Male who presents with a chief complaint of s/p left AKA ccb HTN/anemia/urinary retention/ileus/sepsis sec to uti/bacteremia(completed CTX)/PE    Patient seen and examined at bedside.    ALLERGIES:  No Known Allergies    MEDICATIONS:  acetaminophen   Tablet. 650 milliGRAM(s) Oral every 6 hours PRN  amLODIPine   Tablet 10 milliGRAM(s) Oral daily  apixaban 2.5 milliGRAM(s) Oral every 12 hours  ascorbic acid 500 milliGRAM(s) Oral daily  aspirin  chewable 81 milliGRAM(s) Oral daily  atorvastatin 10 milliGRAM(s) Oral at bedtime  BACItracin   Ointment 1 Application(s) Topical daily  donepezil 10 milliGRAM(s) Oral at bedtime  lisinopril 5 milliGRAM(s) Oral daily  melatonin 6 milliGRAM(s) Oral at bedtime  multivitamin 1 Tablet(s) Oral daily  nystatin Powder 1 Application(s) Topical two times a day  pantoprazole   Suspension 40 milliGRAM(s) Oral daily  polyethylene glycol 3350 17 Gram(s) Oral daily PRN  senna 2 Tablet(s) Oral at bedtime  tamsulosin 0.4 milliGRAM(s) Oral at bedtime    Vital Signs Last 24 Hrs  T(C): 36.9 (18 Jun 2018 10:11), Max: 37.1 (17 Jun 2018 20:25)  T(F): 98.4 (18 Jun 2018 10:11), Max: 98.7 (17 Jun 2018 20:25)  HR: 82 (18 Jun 2018 10:11) (82 - 95)  BP: 119/69 (18 Jun 2018 10:11) (119/69 - 139/72)  BP(mean): --  RR: 14 (18 Jun 2018 10:11) (14 - 14)  SpO2: 96% (18 Jun 2018 10:11) (94% - 96%)  I&O's Summary      PHYSICAL EXAM:  General: NAD, A/O x3  ENT: MMM  Neck: Supple, No JVD  Lungs: Clear to percussion bilaterally  Cardio: RRR, S1/S2, No murmurs  Abdomen: Soft, Nontender, Nondistended; Bowel sounds present  Extremities: No clubbing, cyanosis, or edema    LABS:                        12.5   5.4   )-----------( 238      ( 18 Jun 2018 06:00 )             37.7     06-18    143  |  108  |  34  ----------------------------<  94  3.9   |  25  |  1.08    Ca    9.2      18 Jun 2018 06:00  Phos  3.7     06-18  Mg     1.9     06-18    TPro  6.8  /  Alb  3.0  /  TBili  0.4  /  DBili  x   /  AST  24  /  ALT  30  /  AlkPhos  121  06-18                CAPILLARY BLOOD GLUCOSE                RADIOLOGY & ADDITIONAL TESTS:    Care Discussed with Consultants/Other Providers:

## 2018-06-19 PROCEDURE — 99232 SBSQ HOSP IP/OBS MODERATE 35: CPT | Mod: GC

## 2018-06-19 RX ORDER — MEMANTINE HYDROCHLORIDE 10 MG/1
10 TABLET ORAL EVERY 12 HOURS
Qty: 0 | Refills: 0 | Status: DISCONTINUED | OUTPATIENT
Start: 2018-06-19 | End: 2018-06-23

## 2018-06-19 RX ADMIN — NYSTATIN CREAM 1 APPLICATION(S): 100000 CREAM TOPICAL at 17:52

## 2018-06-19 RX ADMIN — DONEPEZIL HYDROCHLORIDE 10 MILLIGRAM(S): 10 TABLET, FILM COATED ORAL at 21:29

## 2018-06-19 RX ADMIN — PANTOPRAZOLE SODIUM 40 MILLIGRAM(S): 20 TABLET, DELAYED RELEASE ORAL at 12:41

## 2018-06-19 RX ADMIN — MEMANTINE HYDROCHLORIDE 10 MILLIGRAM(S): 10 TABLET ORAL at 17:47

## 2018-06-19 RX ADMIN — AMLODIPINE BESYLATE 10 MILLIGRAM(S): 2.5 TABLET ORAL at 06:15

## 2018-06-19 RX ADMIN — APIXABAN 2.5 MILLIGRAM(S): 2.5 TABLET, FILM COATED ORAL at 06:15

## 2018-06-19 RX ADMIN — Medication 6 MILLIGRAM(S): at 21:29

## 2018-06-19 RX ADMIN — APIXABAN 2.5 MILLIGRAM(S): 2.5 TABLET, FILM COATED ORAL at 17:47

## 2018-06-19 RX ADMIN — Medication 81 MILLIGRAM(S): at 12:41

## 2018-06-19 RX ADMIN — TAMSULOSIN HYDROCHLORIDE 0.4 MILLIGRAM(S): 0.4 CAPSULE ORAL at 21:29

## 2018-06-19 RX ADMIN — NYSTATIN CREAM 1 APPLICATION(S): 100000 CREAM TOPICAL at 06:10

## 2018-06-19 RX ADMIN — ATORVASTATIN CALCIUM 10 MILLIGRAM(S): 80 TABLET, FILM COATED ORAL at 21:29

## 2018-06-19 RX ADMIN — Medication 1 TABLET(S): at 12:41

## 2018-06-19 RX ADMIN — LISINOPRIL 5 MILLIGRAM(S): 2.5 TABLET ORAL at 06:15

## 2018-06-19 RX ADMIN — SENNA PLUS 2 TABLET(S): 8.6 TABLET ORAL at 21:29

## 2018-06-19 RX ADMIN — Medication 500 MILLIGRAM(S): at 12:40

## 2018-06-19 NOTE — CHART NOTE - NSCHARTNOTEFT_GEN_A_CORE
Delayed Entry:  Pt found sitting on ground  after sliding out of wheelchair at 12pm- no noted head trauma or LOC; no new pain. Exam remained stable - incision site without evidence of bleeding or dehiscence.   Will continue fall precautions, bed/chair alarm and enhanced supervision.

## 2018-06-19 NOTE — CHART NOTE - NSCHARTNOTEFT_GEN_A_CORE
Nutrition Follow Up:     Source: Patient [x]    Family [ ]     other [x]: medical chart     Diet : No active diet available. Noted pt was previously receiving Soft, 2gNa + Ensure Enlive 8oz po BID    Pt is confused. Observed good po intake at breakfast- observed intake of supplement after opening for pt. Pt stated that he likes it. PO intake 80% (6/17) as per flow sheets. Pt needs assistance c tray set up and reminders to eat what is on his tray.     Admission Weight: Weight (kg): 85.3kg- no current weight available  % Weight Change: n/a    Pertinent Medications: MEDICATIONS  (STANDING):    Protonix, senna, MVI, vitamin C     Pertinent Labs:  (6/18) BUN 34    Skin: No pressure ulcers- pt s/p left AKA  Edema: +1 left leg/residual limb   Last BM: 6/15 per flow sheets    Estimated Needs:   [x] no change since previous assessment    Previous Nutrition Diagnosis: Increased nutrient needs for wound healing- addressed with Ensure Enlive 8oz po BID (700 kcals, 40g protein), MVI, Vitamin C    Nutrition Diagnosis is [x] ongoing  [ ] resolved [ ] not applicable      New Nutrition Diagnosis: [x] not applicable    Interventions:     1. Activate diet order: soft, low sodium + Ensure Enlive 8oz po BID  2. Provide assistance c meals/tray set-up and remind pt to consume supplements/items on tray  3. Obtain new weight      Monitoring and Evaluation:     [x] PO intake [ ] Tolerance to diet prescription [x] weights [x] follow up per protocol    Kasey Santana RDN

## 2018-06-19 NOTE — PROVIDER CONTACT NOTE (FALL NOTIFICATION) - ASSESSMENT
Patient alert and oriented  no complaints of discomfort/vs taken  bp:  109/70  [id number] 14  o2sat 99% ra  temp 98.1

## 2018-06-19 NOTE — PROGRESS NOTE ADULT - SUBJECTIVE AND OBJECTIVE BOX
History of Present Illness:   88M s/p left AKA on 5/21 with Dr. Fry for acute limb ischemia 2/2 arterial occlusion. Post-op rhabdomyolysis,  acute blood loss anemia, urosepsis (on IV abx until 6/13), urinary retention, ileus and PE started on eliquis, bilateral LE duplex negative for DVT (+right popliteal aneurysm).      Subjective:   Patient seen and examined. No overnight events.  is placed on residual limb, some pain when attempted to fit better but otherwise no complaints. Last BM per nursing 6/17.     [X ] Constitutional WNL  [X ] Cardio WNL   [X ]  Resp WNL   [X ] GI WNL   [ X]  WNL  [X ]  Heme WNL   [X ] Endo WNL  [X ]  Skin WNL  [ ]  MSK WNL  [ ] Neuro WNL  [ ] Cog WNL  [ X] Psych WNL  Vital Signs Last 24 Hrs  T(C): 36.8 (19 Jun 2018 08:48), Max: 36.8 (18 Jun 2018 20:45)  T(F): 98.2 (19 Jun 2018 08:48), Max: 98.3 (18 Jun 2018 20:45)  HR: 94 (19 Jun 2018 08:48) (90 - 94)  BP: 118/74 (19 Jun 2018 08:48) (118/74 - 120/70)  BP(mean): --  RR: 14 (19 Jun 2018 08:48) (14 - 14)  SpO2: 95% (18 Jun 2018 20:45) (95% - 95%)    PHYSICAL EXAM  Gen- NAD, Pleasant affect  CV- RRR,S1/S2   Resp- CTAB, no w/r/c  Abd- Soft, NT, ND  Neuro - confused, oriented to self   Motor - Right HF 3/5, left HF 4/5  Ext- Left residual limb with    Wounds -Left residual limb with steristrips - CDI  Skin - Left forearm skin tear                          12.5   5.4   )-----------( 238      ( 18 Jun 2018 06:00 )             37.7     06-18    143  |  108  |  34<H>  ----------------------------<  94  3.9   |  25  |  1.08    Ca    9.2      18 Jun 2018 06:00  Phos  3.7     06-18  Mg     1.9     06-18    TPro  6.8  /  Alb  3.0<L>  /  TBili  0.4  /  DBili  x   /  AST  24  /  ALT  30  /  AlkPhos  121<H>  06-18      MEDICATIONS  (STANDING):  amLODIPine   Tablet 10 milliGRAM(s) Oral daily  apixaban 2.5 milliGRAM(s) Oral every 12 hours  ascorbic acid 500 milliGRAM(s) Oral daily  aspirin  chewable 81 milliGRAM(s) Oral daily  atorvastatin 10 milliGRAM(s) Oral at bedtime  BACItracin   Ointment 1 Application(s) Topical daily  donepezil 10 milliGRAM(s) Oral at bedtime  lisinopril 5 milliGRAM(s) Oral daily  melatonin 6 milliGRAM(s) Oral at bedtime  memantine 10 milliGRAM(s) Oral every 12 hours  multivitamin 1 Tablet(s) Oral daily  nystatin Powder 1 Application(s) Topical two times a day  pantoprazole   Suspension 40 milliGRAM(s) Oral daily  senna 2 Tablet(s) Oral at bedtime  tamsulosin 0.4 milliGRAM(s) Oral at bedtime    MEDICATIONS  (PRN):  acetaminophen   Tablet. 650 milliGRAM(s) Oral every 6 hours PRN Mild Pain (1 - 3)  polyethylene glycol 3350 17 Gram(s) Oral daily PRN Constipation      Functional Progress:  Gait: Min assist WC mobility 60'  Transfer: max assist sit-stand  ADL: Mod assist UE dressing  Functional transfers: Max assist      Assessment/Plan:   88M s/p left AKA for arterial occlusion with functional impairments.     Left AKA-  provided 6/15    HTN - ASA,  Lisinopril, norvasc    HLD - Lipitor    PE - Eliquis    Insomnia - Melatonin    Dementia - Continue Donezepil 10mg (discussed with clinical pharmacist and 10mg BID is not recommended dose); add namenda 10mg Q12 per medication reconciliation with wife, enhanced supervision, bed/chair alarm, fall precautions     Urosepsis, bacteremia - Ceftriaxone completed on 6/13, DC midline      Cough - resolved     Pain - Tylenol PRN    Skin: Turn Q2, warm pack prn to right medial calf hematoma, bacitracin to skin tear, nystatin powder     Bladder -  Flomax    Bowels: Senna QHS, Miralax PRN    Supplements - Vitamin C, MVI    GI PPX: Protonix    DVT PPX; Eliquis    Rehab - Continue comprehensive rehab program of 3hr/day 5 days/week of PT/OT

## 2018-06-20 DIAGNOSIS — I26.99 OTHER PULMONARY EMBOLISM WITHOUT ACUTE COR PULMONALE: ICD-10-CM

## 2018-06-20 DIAGNOSIS — G30.9 ALZHEIMER'S DISEASE, UNSPECIFIED: ICD-10-CM

## 2018-06-20 DIAGNOSIS — E78.00 PURE HYPERCHOLESTEROLEMIA, UNSPECIFIED: ICD-10-CM

## 2018-06-20 PROCEDURE — 99232 SBSQ HOSP IP/OBS MODERATE 35: CPT

## 2018-06-20 PROCEDURE — 99233 SBSQ HOSP IP/OBS HIGH 50: CPT

## 2018-06-20 RX ADMIN — Medication 81 MILLIGRAM(S): at 12:27

## 2018-06-20 RX ADMIN — ATORVASTATIN CALCIUM 10 MILLIGRAM(S): 80 TABLET, FILM COATED ORAL at 21:53

## 2018-06-20 RX ADMIN — APIXABAN 2.5 MILLIGRAM(S): 2.5 TABLET, FILM COATED ORAL at 17:57

## 2018-06-20 RX ADMIN — TAMSULOSIN HYDROCHLORIDE 0.4 MILLIGRAM(S): 0.4 CAPSULE ORAL at 21:52

## 2018-06-20 RX ADMIN — PANTOPRAZOLE SODIUM 40 MILLIGRAM(S): 20 TABLET, DELAYED RELEASE ORAL at 12:27

## 2018-06-20 RX ADMIN — Medication 1 APPLICATION(S): at 12:27

## 2018-06-20 RX ADMIN — AMLODIPINE BESYLATE 10 MILLIGRAM(S): 2.5 TABLET ORAL at 06:26

## 2018-06-20 RX ADMIN — APIXABAN 2.5 MILLIGRAM(S): 2.5 TABLET, FILM COATED ORAL at 06:25

## 2018-06-20 RX ADMIN — MEMANTINE HYDROCHLORIDE 10 MILLIGRAM(S): 10 TABLET ORAL at 17:57

## 2018-06-20 RX ADMIN — MEMANTINE HYDROCHLORIDE 10 MILLIGRAM(S): 10 TABLET ORAL at 06:25

## 2018-06-20 RX ADMIN — LISINOPRIL 5 MILLIGRAM(S): 2.5 TABLET ORAL at 06:25

## 2018-06-20 RX ADMIN — DONEPEZIL HYDROCHLORIDE 10 MILLIGRAM(S): 10 TABLET, FILM COATED ORAL at 21:54

## 2018-06-20 RX ADMIN — NYSTATIN CREAM 1 APPLICATION(S): 100000 CREAM TOPICAL at 17:56

## 2018-06-20 RX ADMIN — NYSTATIN CREAM 1 APPLICATION(S): 100000 CREAM TOPICAL at 06:25

## 2018-06-20 RX ADMIN — Medication 500 MILLIGRAM(S): at 12:27

## 2018-06-20 RX ADMIN — SENNA PLUS 2 TABLET(S): 8.6 TABLET ORAL at 21:52

## 2018-06-20 RX ADMIN — Medication 6 MILLIGRAM(S): at 21:52

## 2018-06-20 RX ADMIN — Medication 1 TABLET(S): at 12:27

## 2018-06-20 NOTE — PROGRESS NOTE ADULT - ASSESSMENT
89 y/o s/p Left AKA-PT/OT per Rehab.     HTN    Cough     DVT/PE
88male s/p left AKA secondary to arterial occlusion now with functional impairments

## 2018-06-20 NOTE — PROGRESS NOTE ADULT - SUBJECTIVE AND OBJECTIVE BOX
History of Present Illness:   88M s/p left AKA on 5/21 with Dr. Fry for acute limb ischemia 2/2 arterial occlusion. Post-op rhabdomyolysis,  acute blood loss anemia, urosepsis (on IV abx until 6/13), urinary retention, ileus and PE started on eliquis, bilateral LE duplex negative for DVT (+right popliteal aneurysm).      Subjective:   Patient seen and examined. No overnight events.  Pt offered no complaints.    [X ] Constitutional WNL  [X ] Cardio WNL   [X ]  Resp WNL   [X ] GI WNL   [ X]  WNL  [X ]  Heme WNL   [X ] Endo WNL  [X ]  Skin WNL  [ ]  MSK WNL  [ ] Neuro WNL  [ ] Cog WNL  [ X] Psych WNL  Vital Signs Last 24 Hrs  T(C): 36.3 (20 Jun 2018 08:06), Max: 37.3 (19 Jun 2018 20:45)  T(F): 97.4 (20 Jun 2018 08:06), Max: 99.1 (19 Jun 2018 20:45)  HR: 98 (20 Jun 2018 08:06) (98 - 101)  BP: 131/80 (20 Jun 2018 08:06) (127/79 - 131/80)  BP(mean): --  RR: 14 (20 Jun 2018 08:06) (14 - 14)  SpO2: 95% (20 Jun 2018 08:06) (95% - 95%)    PHYSICAL EXAM  Gen- NAD, Pleasant affect  CV- RRR,S1/S2   Resp- CTAB, no w/r/c  Abd- Soft, NT, ND  Neuro - confused, oriented to self   Motor - Right HF 3/5, left HF 4/5  Ext- Left residual limb with    Wounds -Left residual limb with steristrips - CDI  Skin - Left forearm skin tear                          12.5   5.4   )-----------( 238      ( 18 Jun 2018 06:00 )             37.7     06-18    143  |  108  |  34<H>  ----------------------------<  94  3.9   |  25  |  1.08    Ca    9.2      18 Jun 2018 06:00  Phos  3.7     06-18  Mg     1.9     06-18    TPro  6.8  /  Alb  3.0<L>  /  TBili  0.4  /  DBili  x   /  AST  24  /  ALT  30  /  AlkPhos  121<H>  06-18      MEDICATIONS  (STANDING):  amLODIPine   Tablet 10 milliGRAM(s) Oral daily  apixaban 2.5 milliGRAM(s) Oral every 12 hours  ascorbic acid 500 milliGRAM(s) Oral daily  aspirin  chewable 81 milliGRAM(s) Oral daily  atorvastatin 10 milliGRAM(s) Oral at bedtime  BACItracin   Ointment 1 Application(s) Topical daily  donepezil 10 milliGRAM(s) Oral at bedtime  lisinopril 5 milliGRAM(s) Oral daily  melatonin 6 milliGRAM(s) Oral at bedtime  memantine 10 milliGRAM(s) Oral every 12 hours  multivitamin 1 Tablet(s) Oral daily  nystatin Powder 1 Application(s) Topical two times a day  pantoprazole   Suspension 40 milliGRAM(s) Oral daily  senna 2 Tablet(s) Oral at bedtime  tamsulosin 0.4 milliGRAM(s) Oral at bedtime    MEDICATIONS  (PRN):  acetaminophen   Tablet. 650 milliGRAM(s) Oral every 6 hours PRN Mild Pain (1 - 3)  polyethylene glycol 3350 17 Gram(s) Oral daily PRN Constipation    Functional Progress:  Gait: Min assist WC mobility 125'  Transfer: max assist sit-stand  ADL: Mod assist UE dressing  Functional transfers: Max assist      Assessment/Plan:   88M s/p left AKA for arterial occlusion with functional impairments.     Left AKA-  provided 6/15    HTN - ASA,  Lisinopril, norvasc    HLD - Lipitor    PE - Eliquis    Insomnia - Melatonin    Dementia - Continue Donezepil 10mg (discussed with clinical pharmacist and 10mg BID is not recommended dose); added namenda 10mg Q12 per medication reconciliation with wife, enhanced supervision, bed/chair alarm, fall precautions     Urosepsis, bacteremia - Ceftriaxone completed on 6/13, DC midline      Cough - resolved     Pain - Tylenol PRN    Skin: Turn Q2, warm pack prn to right medial calf hematoma, bacitracin to skin tear, nystatin powder     Bladder -  Flomax    Bowels: Senna QHS, Miralax PRN    Supplements - Vitamin C, MVI    GI PPX: Protonix    DVT PPX; Eliquis    Rehab - Continue comprehensive rehab program of 3hr/day 5 days/week of PT/OT

## 2018-06-20 NOTE — PROGRESS NOTE ADULT - SUBJECTIVE AND OBJECTIVE BOX
Patient is a 88y old  Male who presents with a chief complaint of left AKA    Patient seen and examined at bedside.    ALLERGIES:  No Known Allergies        Vital Signs Last 24 Hrs  T(F): 97.4 (20 Jun 2018 08:06), Max: 99.1 (19 Jun 2018 20:45)  HR: 98 (20 Jun 2018 08:06) (98 - 101)  BP: 131/80 (20 Jun 2018 08:06) (127/79 - 131/80)  RR: 14 (20 Jun 2018 08:06) (14 - 14)  SpO2: 95% (20 Jun 2018 08:06) (95% - 95%)  I&O's Summary    MEDICATIONS:  acetaminophen   Tablet. 650 milliGRAM(s) Oral every 6 hours PRN  amLODIPine   Tablet 10 milliGRAM(s) Oral daily  apixaban 2.5 milliGRAM(s) Oral every 12 hours  ascorbic acid 500 milliGRAM(s) Oral daily  aspirin  chewable 81 milliGRAM(s) Oral daily  atorvastatin 10 milliGRAM(s) Oral at bedtime  BACItracin   Ointment 1 Application(s) Topical daily  donepezil 10 milliGRAM(s) Oral at bedtime  lisinopril 5 milliGRAM(s) Oral daily  melatonin 6 milliGRAM(s) Oral at bedtime  memantine 10 milliGRAM(s) Oral every 12 hours  multivitamin 1 Tablet(s) Oral daily  nystatin Powder 1 Application(s) Topical two times a day  pantoprazole   Suspension 40 milliGRAM(s) Oral daily  polyethylene glycol 3350 17 Gram(s) Oral daily PRN  senna 2 Tablet(s) Oral at bedtime  tamsulosin 0.4 milliGRAM(s) Oral at bedtime      PHYSICAL EXAM:  General: NAD, A/O x 3  ENT: MMM  Neck: Supple, No JVD  Lungs: Clear to auscultation bilaterally  Cardio: RRR, S1/S2, No murmurs  Abdomen: Soft, Nontender, Nondistended; Bowel sounds present  Extremities: Left residual limb with /steri strips    LABS:                        12.5   5.4   )-----------( 238      ( 18 Jun 2018 06:00 )             37.7     06-18    143  |  108  |  34  ----------------------------<  94  3.9   |  25  |  1.08    Ca    9.2      18 Jun 2018 06:00  Phos  3.7     06-18  Mg     1.9     06-18    TPro  6.8  /  Alb  3.0  /  TBili  0.4  /  DBili  x   /  AST  24  /  ALT  30  /  AlkPhos  121  06-18    eGFR if Non African American: 61 mL/min/1.73M2 (06-18-18 @ 06:00)  eGFR if : 71 mL/min/1.73M2 (06-18-18 @ 06:00)                    CAPILLARY BLOOD GLUCOSE                RADIOLOGY & ADDITIONAL TESTS:    Care Discussed with Consultants/Other Providers:

## 2018-06-20 NOTE — PROGRESS NOTE ADULT - PROBLEM SELECTOR PROBLEM 4
Chronic deep vein thrombosis (DVT) of lower extremity, unspecified laterality, unspecified vein
Other pulmonary embolism without acute cor pulmonale, unspecified chronicity

## 2018-06-21 ENCOUNTER — TRANSCRIPTION ENCOUNTER (OUTPATIENT)
Age: 83
End: 2018-06-21

## 2018-06-21 PROCEDURE — 99232 SBSQ HOSP IP/OBS MODERATE 35: CPT

## 2018-06-21 RX ORDER — LISINOPRIL 2.5 MG/1
1 TABLET ORAL
Qty: 0 | Refills: 0 | DISCHARGE
Start: 2018-06-21

## 2018-06-21 RX ORDER — ATORVASTATIN CALCIUM 80 MG/1
1 TABLET, FILM COATED ORAL
Qty: 0 | Refills: 0 | COMMUNITY

## 2018-06-21 RX ORDER — APIXABAN 2.5 MG/1
1 TABLET, FILM COATED ORAL
Qty: 0 | Refills: 0 | DISCHARGE
Start: 2018-06-21

## 2018-06-21 RX ORDER — MEMANTINE HYDROCHLORIDE 10 MG/1
1 TABLET ORAL
Qty: 0 | Refills: 0 | DISCHARGE
Start: 2018-06-21

## 2018-06-21 RX ORDER — SENNA PLUS 8.6 MG/1
2 TABLET ORAL
Qty: 0 | Refills: 0 | DISCHARGE
Start: 2018-06-21

## 2018-06-21 RX ORDER — NYSTATIN CREAM 100000 [USP'U]/G
1 CREAM TOPICAL
Qty: 0 | Refills: 0 | DISCHARGE
Start: 2018-06-21

## 2018-06-21 RX ORDER — ATORVASTATIN CALCIUM 80 MG/1
1 TABLET, FILM COATED ORAL
Qty: 0 | Refills: 0 | DISCHARGE
Start: 2018-06-21

## 2018-06-21 RX ORDER — ACETAMINOPHEN 500 MG
2 TABLET ORAL
Qty: 0 | Refills: 0 | DISCHARGE
Start: 2018-06-21

## 2018-06-21 RX ORDER — AMLODIPINE BESYLATE 2.5 MG/1
1 TABLET ORAL
Qty: 0 | Refills: 0 | DISCHARGE
Start: 2018-06-21

## 2018-06-21 RX ORDER — ASPIRIN/CALCIUM CARB/MAGNESIUM 324 MG
1 TABLET ORAL
Qty: 0 | Refills: 0 | COMMUNITY

## 2018-06-21 RX ORDER — POLYETHYLENE GLYCOL 3350 17 G/17G
17 POWDER, FOR SOLUTION ORAL
Qty: 0 | Refills: 0 | DISCHARGE
Start: 2018-06-21

## 2018-06-21 RX ORDER — LANOLIN ALCOHOL/MO/W.PET/CERES
2 CREAM (GRAM) TOPICAL
Qty: 0 | Refills: 0 | DISCHARGE
Start: 2018-06-21

## 2018-06-21 RX ORDER — PANTOPRAZOLE SODIUM 20 MG/1
1 TABLET, DELAYED RELEASE ORAL
Qty: 0 | Refills: 0 | DISCHARGE
Start: 2018-06-21

## 2018-06-21 RX ORDER — TAMSULOSIN HYDROCHLORIDE 0.4 MG/1
1 CAPSULE ORAL
Qty: 0 | Refills: 0 | DISCHARGE
Start: 2018-06-21

## 2018-06-21 RX ORDER — ASPIRIN/CALCIUM CARB/MAGNESIUM 324 MG
1 TABLET ORAL
Qty: 0 | Refills: 0 | DISCHARGE
Start: 2018-06-21

## 2018-06-21 RX ORDER — ASCORBIC ACID 60 MG
1 TABLET,CHEWABLE ORAL
Qty: 0 | Refills: 0 | DISCHARGE
Start: 2018-06-21

## 2018-06-21 RX ORDER — BACITRACIN ZINC 500 UNIT/G
1 OINTMENT IN PACKET (EA) TOPICAL
Qty: 0 | Refills: 0 | DISCHARGE
Start: 2018-06-21

## 2018-06-21 RX ADMIN — PANTOPRAZOLE SODIUM 40 MILLIGRAM(S): 20 TABLET, DELAYED RELEASE ORAL at 14:06

## 2018-06-21 RX ADMIN — NYSTATIN CREAM 1 APPLICATION(S): 100000 CREAM TOPICAL at 06:38

## 2018-06-21 RX ADMIN — Medication 6 MILLIGRAM(S): at 21:57

## 2018-06-21 RX ADMIN — AMLODIPINE BESYLATE 10 MILLIGRAM(S): 2.5 TABLET ORAL at 07:06

## 2018-06-21 RX ADMIN — SENNA PLUS 2 TABLET(S): 8.6 TABLET ORAL at 21:57

## 2018-06-21 RX ADMIN — NYSTATIN CREAM 1 APPLICATION(S): 100000 CREAM TOPICAL at 20:14

## 2018-06-21 RX ADMIN — DONEPEZIL HYDROCHLORIDE 10 MILLIGRAM(S): 10 TABLET, FILM COATED ORAL at 21:57

## 2018-06-21 RX ADMIN — ATORVASTATIN CALCIUM 10 MILLIGRAM(S): 80 TABLET, FILM COATED ORAL at 21:56

## 2018-06-21 RX ADMIN — MEMANTINE HYDROCHLORIDE 10 MILLIGRAM(S): 10 TABLET ORAL at 07:06

## 2018-06-21 RX ADMIN — Medication 500 MILLIGRAM(S): at 14:06

## 2018-06-21 RX ADMIN — TAMSULOSIN HYDROCHLORIDE 0.4 MILLIGRAM(S): 0.4 CAPSULE ORAL at 21:57

## 2018-06-21 RX ADMIN — Medication 1 TABLET(S): at 14:06

## 2018-06-21 RX ADMIN — APIXABAN 2.5 MILLIGRAM(S): 2.5 TABLET, FILM COATED ORAL at 17:21

## 2018-06-21 RX ADMIN — LISINOPRIL 5 MILLIGRAM(S): 2.5 TABLET ORAL at 07:05

## 2018-06-21 RX ADMIN — APIXABAN 2.5 MILLIGRAM(S): 2.5 TABLET, FILM COATED ORAL at 07:06

## 2018-06-21 RX ADMIN — MEMANTINE HYDROCHLORIDE 10 MILLIGRAM(S): 10 TABLET ORAL at 17:22

## 2018-06-21 RX ADMIN — Medication 81 MILLIGRAM(S): at 14:06

## 2018-06-21 NOTE — DISCHARGE NOTE ADULT - PROVIDER TOKENS
TOKEN:'[id number]',FREE:[LAST:[PCP],PHONE:[(   )    -],FAX:[(   )    -]],FREE:[LAST:[Neurology],PHONE:[(   )    -],FAX:[(   )    -]]

## 2018-06-21 NOTE — DISCHARGE NOTE ADULT - SECONDARY DIAGNOSIS.
Alzheimer disease AAA (abdominal aortic aneurysm) Cataract of both eyes DVT (deep venous thrombosis) Essential hypertension History of cataract surgery

## 2018-06-21 NOTE — PROGRESS NOTE ADULT - ATTENDING COMMENTS
Agree with above  Cont comprehensive rehab, DVT ppx, wound care, 
Agree with above.  Medically stable.  Incision left residual limb healing well.  Pt made limited progress in rehab; plan for continued inpatient rehab at subacute facility.
Agree with above.  Medically stable.  Making gains.  Cont comprehensive rehab, dvt ppx, pain management

## 2018-06-21 NOTE — DISCHARGE NOTE ADULT - PATIENT PORTAL LINK FT
You can access the [facility] Patient Portal, offered by [facility], by registering with the following website: [email or web address]

## 2018-06-21 NOTE — DISCHARGE NOTE ADULT - CARE PLAN
Principal Discharge DX:	Above knee amputation of left lower extremity  Goal:	Continue therapy at [facility] for progression of function  Assessment and plan of treatment:	PT, OT for AKA and ST for cognition  Secondary Diagnosis:	Alzheimer disease  Secondary Diagnosis:	AAA (abdominal aortic aneurysm)  Secondary Diagnosis:	Cataract of both eyes  Secondary Diagnosis:	DVT (deep venous thrombosis)  Secondary Diagnosis:	Essential hypertension  Secondary Diagnosis:	History of cataract surgery

## 2018-06-21 NOTE — DISCHARGE NOTE ADULT - MEDICATION SUMMARY - MEDICATIONS TO TAKE
I will START or STAY ON the medications listed below when I get home from the hospital:    acetaminophen 325 mg oral tablet  -- 2 tab(s) by mouth every 6 hours, As needed, Mild Pain (1 - 3)  -- Indication: For Pain    aspirin 81 mg oral tablet, chewable  -- 1 tab(s) by mouth once a day  -- Indication: For Pure hypercholesterolemia    lisinopril 5 mg oral tablet  -- 1 tab(s) by mouth once a day  -- Indication: For hypertension    tamsulosin 0.4 mg oral capsule  -- 1 cap(s) by mouth once a day (at bedtime)  -- Indication: For BPH    apixaban 2.5 mg oral tablet  -- 1 tab(s) by mouth every 12 hours  -- Indication: For Other pulmonary embolism without acute cor pulmonale, unspecified chronicity    atorvastatin 10 mg oral tablet  -- 1 tab(s) by mouth once a day (at bedtime)  -- Indication: For Hyperlipidemia    amLODIPine 10 mg oral tablet  -- 1 tab(s) by mouth once a day  -- Indication: For hypertension    donepezil 10 mg oral tablet  -- 1 tab(s) by mouth 2 times a day  -- Indication: For dementia    bacitracin 500 units/g topical ointment  -- 1 application on skin once a day  -- Indication: For skin tear    nystatin 100,000 units/g topical powder  -- 1 application on skin 2 times a day  -- Indication: For skin care     senna oral tablet  -- 2 tab(s) by mouth once a day (at bedtime)  -- Indication: For Constipation    polyethylene glycol 3350 oral powder for reconstitution  -- 17 gram(s) by mouth once a day, As needed, Constipation  -- Indication: For Constipiaton     memantine 10 mg oral tablet  -- 1 tab(s) by mouth every 12 hours  -- Indication: For dementia    melatonin 3 mg oral tablet  -- 2 tab(s) by mouth once a day (at bedtime)  -- Indication: For insomnia    pantoprazole 40 mg oral granule, delayed release  -- 1  by mouth once a day  -- Indication: For gi PPX    Multiple Vitamins oral tablet  -- 1 tab(s) by mouth once a day  -- Indication: For supplement    Ocuvite oral tablet  -- 1 tab(s) by mouth once a day  -- Indication: For supplement     ascorbic acid 500 mg oral tablet  -- 1 tab(s) by mouth once a day  -- Indication: For supplement

## 2018-06-21 NOTE — DISCHARGE NOTE ADULT - PLAN OF CARE
Continue therapy at [facility] for progression of function PT, OT for AKA and ST for cognition

## 2018-06-21 NOTE — DISCHARGE NOTE ADULT - NS AS ACTIVITY OBS
Walking-Indoors allowed/with supervision and assistance/Walking-Outdoors allowed/Do not make important decisions/Do not drive or operate machinery/Showering allowed/No Heavy lifting/straining

## 2018-06-21 NOTE — PROGRESS NOTE ADULT - SUBJECTIVE AND OBJECTIVE BOX
History of Present Illness:   88M s/p left AKA on 5/21 with Dr. Fry for acute limb ischemia 2/2 arterial occlusion. Post-op rhabdomyolysis,  acute blood loss anemia, urosepsis (on IV abx until 6/13), urinary retention, ileus and PE started on eliquis, bilateral LE duplex negative for DVT (+right popliteal aneurysm).      Subjective:   Patient seen and examined. No acute overnight events. Slept well, no complaints of pain at present.     [X ] Constitutional WNL  [X ] Cardio WNL   [X ]  Resp WNL   [X ] GI WNL   [ X]  WNL  [X ]  Heme WNL   [X ] Endo WNL  [X ]  Skin WNL  [ ]  MSK WNL  [ ] Neuro WNL  [ ] Cog WNL  [ X] Psych WNL  Vital Signs Last 24 Hrs  T(C): 36.4 (21 Jun 2018 08:28), Max: 37 (20 Jun 2018 22:20)  T(F): 97.6 (21 Jun 2018 08:28), Max: 98.6 (20 Jun 2018 22:20)  HR: 95 (21 Jun 2018 08:28) (95 - 96)  BP: 122/76 (21 Jun 2018 08:28) (122/76 - 132/72)  BP(mean): --  RR: 14 (21 Jun 2018 08:28) (14 - 14)  SpO2: 96% (21 Jun 2018 08:28) (95% - 96%)    PHYSICAL EXAM  Gen- NAD, Pleasant affect  CV- RRR,S1/S2   Resp- CTAB, no w/r/c  Abd- Soft, NT, ND  Neuro - confused, oriented to self   Motor - Right HF 3/5, left HF 4/5  Ext- Left residual limb with    Wounds -Left residual limb with steristrips - CDI  Skin - Left forearm skin tear                          12.5   5.4   )-----------( 238      ( 18 Jun 2018 06:00 )             37.7     06-18    143  |  108  |  34<H>  ----------------------------<  94  3.9   |  25  |  1.08    Ca    9.2      18 Jun 2018 06:00  Phos  3.7     06-18  Mg     1.9     06-18    TPro  6.8  /  Alb  3.0<L>  /  TBili  0.4  /  DBili  x   /  AST  24  /  ALT  30  /  AlkPhos  121<H>  06-18      MEDICATIONS  (STANDING):  amLODIPine   Tablet 10 milliGRAM(s) Oral daily  apixaban 2.5 milliGRAM(s) Oral every 12 hours  ascorbic acid 500 milliGRAM(s) Oral daily  aspirin  chewable 81 milliGRAM(s) Oral daily  atorvastatin 10 milliGRAM(s) Oral at bedtime  BACItracin   Ointment 1 Application(s) Topical daily  donepezil 10 milliGRAM(s) Oral at bedtime  lisinopril 5 milliGRAM(s) Oral daily  melatonin 6 milliGRAM(s) Oral at bedtime  memantine 10 milliGRAM(s) Oral every 12 hours  multivitamin 1 Tablet(s) Oral daily  nystatin Powder 1 Application(s) Topical two times a day  pantoprazole   Suspension 40 milliGRAM(s) Oral daily  senna 2 Tablet(s) Oral at bedtime  tamsulosin 0.4 milliGRAM(s) Oral at bedtime    MEDICATIONS  (PRN):  acetaminophen   Tablet. 650 milliGRAM(s) Oral every 6 hours PRN Mild Pain (1 - 3)  polyethylene glycol 3350 17 Gram(s) Oral daily PRN Constipation    Functional Progress:  Gait: Min assist WC mobility 125'  Transfer: max assist sit-stand  ADL: Mod assist UE dressing  Functional transfers: Max assist      Assessment/Plan:   88M s/p left AKA for arterial occlusion with functional impairments.     Left AKA-  provided 6/15    HTN - ASA,  Lisinopril, norvasc    HLD - Lipitor    PE - Eliquis    Insomnia - Melatonin    Dementia - Continue Donezepil 10mg (discussed with clinical pharmacist and 10mg BID is not recommended dose); added namenda 10mg Q12 per medication reconciliation with wife, enhanced supervision, bed/chair alarm, fall precautions     Urosepsis, bacteremia - Ceftriaxone completed on 6/13, DC midline      Cough - resolved     Pain - Tylenol PRN    Skin: Turn Q2, warm pack prn to right medial calf hematoma, bacitracin to skin tear, nystatin powder     Bladder -  Flomax    Bowels: Senna QHS, Miralax PRN    Supplements - Vitamin C, MVI    GI PPX: Protonix    DVT PPX; Eliquis    Rehab - Discharge to [facility] today.

## 2018-06-21 NOTE — DISCHARGE NOTE ADULT - HOSPITAL COURSE
87yo M with peripheral arterial disease and recent diagnosis of left lower extremity DVT (recently started on lovenox/coumadin with thereputic INR) presented to [facility] on 5/19 with left lower extremity numbness and transferred to [facility] for acute limb ischemia. CTA showed Multiple irregular filling defect in the left superficial femoral artery, the left SFA occludes by the adductor canal without reconstitution of distal arteries of the left leg, concerning for severe arterial compromise to the left calf and foot as well as popliteal aneurysm. Pre-op pt received FFP for elevated INR (4.2). Pt is s/p left AKA on 5/21 inder Fry. There were no  intra-operative complications.     Post-op course notable for hypertension requiring IV medications, rhabdomyolysis which improved,  urinary retention requiring parra, ileus managed with bowel meds, acute blood loss anemia not requiring transfusion, code sepsis (for fever) and rapid response (for hypoxia and change in mental status) on 5/29 secondary to urosepsis and bacteremia started on ceftriaxone to be completed on 6/13 through  midline placement on 6/4.  Also found to have small pulmonary embolus in the right upper lobe  transitioned from heparin gtt to eliquis, Bilatera LE duplex was negative for DVT, showed right popliteal aneurysm. Patient deemed stable for discharge to acute rehab on 6/4.     Rehab course notable for fall on 6/19 (slid from wheelchair) without notable injury, hypertension for which Norvasc was increased; PVR monitored and were within normal limits and therefore discontinued, resumed on home doses of dementia medications. Patient deemed stable for discharge to subacute rehab on 6/21.

## 2018-06-21 NOTE — DISCHARGE NOTE ADULT - CARE PROVIDER_API CALL
Grayson Fry), Surgery  [address]  [address]  [place] [address]  Phone: [phone]  Fax: [phone]    PCP,   Phone: (   )    -  Fax: (   )    -    Neurology,   Phone: (   )    -  Fax: (   )    -

## 2018-06-22 PROCEDURE — 99232 SBSQ HOSP IP/OBS MODERATE 35: CPT

## 2018-06-22 RX ADMIN — DONEPEZIL HYDROCHLORIDE 10 MILLIGRAM(S): 10 TABLET, FILM COATED ORAL at 21:44

## 2018-06-22 RX ADMIN — LISINOPRIL 5 MILLIGRAM(S): 2.5 TABLET ORAL at 06:19

## 2018-06-22 RX ADMIN — PANTOPRAZOLE SODIUM 40 MILLIGRAM(S): 20 TABLET, DELAYED RELEASE ORAL at 13:12

## 2018-06-22 RX ADMIN — SENNA PLUS 2 TABLET(S): 8.6 TABLET ORAL at 21:44

## 2018-06-22 RX ADMIN — APIXABAN 2.5 MILLIGRAM(S): 2.5 TABLET, FILM COATED ORAL at 17:02

## 2018-06-22 RX ADMIN — MEMANTINE HYDROCHLORIDE 10 MILLIGRAM(S): 10 TABLET ORAL at 06:19

## 2018-06-22 RX ADMIN — MEMANTINE HYDROCHLORIDE 10 MILLIGRAM(S): 10 TABLET ORAL at 17:02

## 2018-06-22 RX ADMIN — NYSTATIN CREAM 1 APPLICATION(S): 100000 CREAM TOPICAL at 06:20

## 2018-06-22 RX ADMIN — TAMSULOSIN HYDROCHLORIDE 0.4 MILLIGRAM(S): 0.4 CAPSULE ORAL at 21:44

## 2018-06-22 RX ADMIN — Medication 500 MILLIGRAM(S): at 13:12

## 2018-06-22 RX ADMIN — Medication 6 MILLIGRAM(S): at 21:44

## 2018-06-22 RX ADMIN — ATORVASTATIN CALCIUM 10 MILLIGRAM(S): 80 TABLET, FILM COATED ORAL at 21:44

## 2018-06-22 RX ADMIN — APIXABAN 2.5 MILLIGRAM(S): 2.5 TABLET, FILM COATED ORAL at 06:19

## 2018-06-22 RX ADMIN — AMLODIPINE BESYLATE 10 MILLIGRAM(S): 2.5 TABLET ORAL at 06:18

## 2018-06-22 RX ADMIN — Medication 1 TABLET(S): at 13:12

## 2018-06-22 RX ADMIN — NYSTATIN CREAM 1 APPLICATION(S): 100000 CREAM TOPICAL at 21:44

## 2018-06-22 RX ADMIN — Medication 81 MILLIGRAM(S): at 13:12

## 2018-06-22 NOTE — PROGRESS NOTE ADULT - SUBJECTIVE AND OBJECTIVE BOX
History of Present Illness:   88M s/p left AKA on 5/21 with Dr. Fry for acute limb ischemia 2/2 arterial occlusion. Post-op rhabdomyolysis,  acute blood loss anemia, urosepsis (on IV abx until 6/13), urinary retention, ileus and PE started on eliquis, bilateral LE duplex negative for DVT (+right popliteal aneurysm).      Subjective:   Patient seen and examined. No acute overnight events. Slept well  No complaints offered    [X ] Constitutional WNL  [X ] Cardio WNL   [X ]  Resp WNL   [X ] GI WNL   [ X]  WNL  [X ]  Heme WNL   [X ] Endo WNL  [X ]  Skin WNL  [ ]  MSK WNL  [ ] Neuro WNL  [ ] Cog WNL  [ X] Psych WNL  Vital Signs Last 24 Hrs  T(C): 36.6 (22 Jun 2018 07:48), Max: 37.1 (22 Jun 2018 00:25)  T(F): 97.8 (22 Jun 2018 07:48), Max: 98.7 (22 Jun 2018 00:25)  HR: 90 (22 Jun 2018 07:48) (90 - 95)  BP: 117/74 (22 Jun 2018 07:48) (117/74 - 138/85)  BP(mean): --  RR: 14 (22 Jun 2018 07:48) (14 - 14)  SpO2: 94% (22 Jun 2018 00:25) (94% - 94%)    PHYSICAL EXAM  Gen- NAD, Pleasant affect  CV- RRR,S1/S2   Resp- CTAB, no w/r/c  Abd- Soft, NT, ND  Neuro - confused, oriented to self   Motor - Right HF 3/5, left HF 4/5  Ext- Left residual limb with    Wounds -Left residual limb incision with steristrips - CDI  No erythema noted  Skin - Left forearm skin tear                          12.5   5.4   )-----------( 238      ( 18 Jun 2018 06:00 )             37.7     06-18    143  |  108  |  34<H>  ----------------------------<  94  3.9   |  25  |  1.08    Ca    9.2      18 Jun 2018 06:00  Phos  3.7     06-18  Mg     1.9     06-18    TPro  6.8  /  Alb  3.0<L>  /  TBili  0.4  /  DBili  x   /  AST  24  /  ALT  30  /  AlkPhos  121<H>  06-18      MEDICATIONS  (STANDING):  amLODIPine   Tablet 10 milliGRAM(s) Oral daily  apixaban 2.5 milliGRAM(s) Oral every 12 hours  ascorbic acid 500 milliGRAM(s) Oral daily  aspirin  chewable 81 milliGRAM(s) Oral daily  atorvastatin 10 milliGRAM(s) Oral at bedtime  BACItracin   Ointment 1 Application(s) Topical daily  donepezil 10 milliGRAM(s) Oral at bedtime  lisinopril 5 milliGRAM(s) Oral daily  melatonin 6 milliGRAM(s) Oral at bedtime  memantine 10 milliGRAM(s) Oral every 12 hours  multivitamin 1 Tablet(s) Oral daily  nystatin Powder 1 Application(s) Topical two times a day  pantoprazole   Suspension 40 milliGRAM(s) Oral daily  senna 2 Tablet(s) Oral at bedtime  tamsulosin 0.4 milliGRAM(s) Oral at bedtime    MEDICATIONS  (PRN):  acetaminophen   Tablet. 650 milliGRAM(s) Oral every 6 hours PRN Mild Pain (1 - 3)  polyethylene glycol 3350 17 Gram(s) Oral daily PRN Constipation      Functional Progress:  Gait: Min assist WC mobility 125'  Transfer: min/mod assist sit- parallel bars  ADL: Mod assist UE dressing  Functional transfers: Max assist      Assessment/Plan:   88M s/p left AKA for arterial occlusion with functional impairments.     Left AKA-  provided 6/15    HTN - ASA,  Lisinopril, norvasc    HLD - Lipitor    PE - Eliquis    Insomnia - Melatonin    Dementia - Continue Donezepil 10mg (discussed with clinical pharmacist and 10mg BID is not recommended dose); added namenda 10mg Q12 per medication reconciliation with wife, enhanced supervision, bed/chair alarm, fall precautions     Urosepsis, bacteremia - Ceftriaxone completed on 6/13, DCd midline      Cough - resolved     Pain - Tylenol PRN    Skin: Turn Q2, bacitracin to skin tear, nystatin powder     Bladder -  Flomax    Bowels: Senna QHS, Miralax PRN    Supplements - Vitamin C, MVI    GI PPX: Protonix    DVT PPX; Eliquis    Rehab - Discharge to [facility]-planned for 6/23

## 2018-06-23 VITALS
SYSTOLIC BLOOD PRESSURE: 110 MMHG | OXYGEN SATURATION: 97 % | DIASTOLIC BLOOD PRESSURE: 64 MMHG | WEIGHT: 170.64 LBS | HEART RATE: 98 BPM | RESPIRATION RATE: 14 BRPM | TEMPERATURE: 98 F

## 2018-06-23 PROCEDURE — 99238 HOSP IP/OBS DSCHRG MGMT 30/<: CPT

## 2018-06-23 RX ADMIN — LISINOPRIL 5 MILLIGRAM(S): 2.5 TABLET ORAL at 06:32

## 2018-06-23 RX ADMIN — MEMANTINE HYDROCHLORIDE 10 MILLIGRAM(S): 10 TABLET ORAL at 06:32

## 2018-06-23 RX ADMIN — Medication 81 MILLIGRAM(S): at 11:18

## 2018-06-23 RX ADMIN — AMLODIPINE BESYLATE 10 MILLIGRAM(S): 2.5 TABLET ORAL at 06:32

## 2018-06-23 RX ADMIN — APIXABAN 2.5 MILLIGRAM(S): 2.5 TABLET, FILM COATED ORAL at 06:32

## 2018-06-23 RX ADMIN — PANTOPRAZOLE SODIUM 40 MILLIGRAM(S): 20 TABLET, DELAYED RELEASE ORAL at 11:19

## 2018-06-23 RX ADMIN — Medication 500 MILLIGRAM(S): at 11:18

## 2018-06-23 RX ADMIN — NYSTATIN CREAM 1 APPLICATION(S): 100000 CREAM TOPICAL at 06:32

## 2018-06-23 RX ADMIN — Medication 1 TABLET(S): at 11:19

## 2018-06-23 NOTE — PROGRESS NOTE ADULT - PROVIDER SPECIALTY LIST ADULT
Hospitalist
Hospitalist
Rehab Medicine
Hospitalist

## 2018-06-23 NOTE — PROGRESS NOTE ADULT - SUBJECTIVE AND OBJECTIVE BOX
History of Present Illness:   88M s/p left AKA on 5/21 with Dr. Fry for acute limb ischemia 2/2 arterial occlusion. Post-op rhabdomyolysis,  acute blood loss anemia, urosepsis (on IV abx until 6/13), urinary retention, ileus and PE started on eliquis, bilateral LE duplex negative for DVT (+right popliteal aneurysm).      Subjective:   Patient seen and examined. No acute overnight events. Slept well  No complaints offered    [X ] Constitutional WNL  [X ] Cardio WNL   [X ]  Resp WNL   [X ] GI WNL   [ X]  WNL  [X ]  Heme WNL   [X ] Endo WNL  [X ]  Skin WNL  [ ]  MSK WNL  [ ] Neuro WNL  [ ] Cog WNL  [ X] Psych WNL  Vital Signs Last 24 Hrs  T(C): 36.6 (22 Jun 2018 07:48), Max: 37.1 (22 Jun 2018 00:25)  T(F): 97.8 (22 Jun 2018 07:48), Max: 98.7 (22 Jun 2018 00:25)  HR: 90 (22 Jun 2018 07:48) (90 - 95)  BP: 117/74 (22 Jun 2018 07:48) (117/74 - 138/85)  BP(mean): --  RR: 14 (22 Jun 2018 07:48) (14 - 14)  SpO2: 94% (22 Jun 2018 00:25) (94% - 94%)    PHYSICAL EXAM  Gen- NAD, Pleasant affect  CV- RRR,S1/S2   Resp- CTAB, no w/r/c  Abd- Soft, NT, ND  Neuro - confused, oriented to self   Motor - Right HF 3/5, left HF 4/5  Ext- Left residual limb with    Wounds -Left residual limb incision with steristrips - CDI  No erythema noted  Skin - Left forearm skin tear                          12.5   5.4   )-----------( 238      ( 18 Jun 2018 06:00 )             37.7     06-18    143  |  108  |  34<H>  ----------------------------<  94  3.9   |  25  |  1.08    Ca    9.2      18 Jun 2018 06:00  Phos  3.7     06-18  Mg     1.9     06-18    TPro  6.8  /  Alb  3.0<L>  /  TBili  0.4  /  DBili  x   /  AST  24  /  ALT  30  /  AlkPhos  121<H>  06-18      MEDICATIONS  (STANDING):  amLODIPine   Tablet 10 milliGRAM(s) Oral daily  apixaban 2.5 milliGRAM(s) Oral every 12 hours  ascorbic acid 500 milliGRAM(s) Oral daily  aspirin  chewable 81 milliGRAM(s) Oral daily  atorvastatin 10 milliGRAM(s) Oral at bedtime  BACItracin   Ointment 1 Application(s) Topical daily  donepezil 10 milliGRAM(s) Oral at bedtime  lisinopril 5 milliGRAM(s) Oral daily  melatonin 6 milliGRAM(s) Oral at bedtime  memantine 10 milliGRAM(s) Oral every 12 hours  multivitamin 1 Tablet(s) Oral daily  nystatin Powder 1 Application(s) Topical two times a day  pantoprazole   Suspension 40 milliGRAM(s) Oral daily  senna 2 Tablet(s) Oral at bedtime  tamsulosin 0.4 milliGRAM(s) Oral at bedtime    MEDICATIONS  (PRN):  acetaminophen   Tablet. 650 milliGRAM(s) Oral every 6 hours PRN Mild Pain (1 - 3)  polyethylene glycol 3350 17 Gram(s) Oral daily PRN Constipation      Functional Progress:  Gait: Min assist WC mobility 125'  Transfer: min/mod assist sit- parallel bars  ADL: Mod assist UE dressing  Functional transfers: Max assist      Assessment/Plan:   88M s/p left AKA for arterial occlusion with functional impairments. Pt stable for discharge to [facility].    Left AKA-  provided 6/15    HTN - ASA,  Lisinopril, norvasc    HLD - Lipitor    PE - Eliquis    Insomnia - Melatonin    Dementia - Continue Donezepil 10mg (discussed with clinical pharmacist and 10mg BID is not recommended dose); added namenda 10mg Q12 per medication reconciliation with wife, enhanced supervision, bed/chair alarm, fall precautions     Urosepsis, bacteremia - Ceftriaxone completed on 6/13, DCd midline      Cough - resolved     Pain - Tylenol PRN    Skin: Turn Q2, bacitracin to skin tear, nystatin powder     Bladder -  Flomax    Bowels: Senna QHS, Miralax PRN    Supplements - Vitamin C, MVI    GI PPX: Protonix    DVT PPX; Eliquis    Rehab - Discharge to [facility]-planned for 6/23

## 2018-06-25 LAB
HCT VFR BLDA CALC: 53 — HIGH (ref 39–50)
HGB BLD CALC-MCNC: 17.3 G/DL — HIGH (ref 13–17)
OTHER CELLS CSF MANUAL: 24 ML/DL — HIGH (ref 18–22)

## 2018-07-08 DIAGNOSIS — N40.1 BENIGN PROSTATIC HYPERPLASIA WITH LOWER URINARY TRACT SYMPTOMS: ICD-10-CM

## 2018-07-08 DIAGNOSIS — I71.4 ABDOMINAL AORTIC ANEURYSM, WITHOUT RUPTURE: ICD-10-CM

## 2018-07-08 DIAGNOSIS — Z79.2 LONG TERM (CURRENT) USE OF ANTIBIOTICS: ICD-10-CM

## 2018-07-08 DIAGNOSIS — D64.9 ANEMIA, UNSPECIFIED: ICD-10-CM

## 2018-07-08 DIAGNOSIS — R05 COUGH: ICD-10-CM

## 2018-07-08 DIAGNOSIS — R33.9 RETENTION OF URINE, UNSPECIFIED: ICD-10-CM

## 2018-07-08 DIAGNOSIS — I73.9 PERIPHERAL VASCULAR DISEASE, UNSPECIFIED: ICD-10-CM

## 2018-07-08 DIAGNOSIS — I10 ESSENTIAL (PRIMARY) HYPERTENSION: ICD-10-CM

## 2018-07-08 DIAGNOSIS — Z86.711 PERSONAL HISTORY OF PULMONARY EMBOLISM: ICD-10-CM
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
## 2018-07-08 DIAGNOSIS — Z51.89 ENCOUNTER FOR OTHER SPECIFIED AFTERCARE: ICD-10-CM

## 2018-07-08 DIAGNOSIS — Z47.81 ENCOUNTER FOR ORTHOPEDIC AFTERCARE FOLLOWING SURGICAL AMPUTATION: ICD-10-CM

## 2018-07-08 DIAGNOSIS — Z66 DO NOT RESUSCITATE: ICD-10-CM

## 2018-07-08 DIAGNOSIS — N39.0 URINARY TRACT INFECTION, SITE NOT SPECIFIED: ICD-10-CM

## 2018-07-08 DIAGNOSIS — B30.9 VIRAL CONJUNCTIVITIS, UNSPECIFIED: ICD-10-CM
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
## 2018-07-08 DIAGNOSIS — Z89.612 ACQUIRED ABSENCE OF LEFT LEG ABOVE KNEE: ICD-10-CM

## 2018-07-08 DIAGNOSIS — Z85.46 PERSONAL HISTORY OF MALIGNANT NEOPLASM OF PROSTATE: ICD-10-CM

## 2018-07-08 DIAGNOSIS — E78.5 HYPERLIPIDEMIA, UNSPECIFIED: ICD-10-CM

## 2018-07-08 DIAGNOSIS — Z86.718 PERSONAL HISTORY OF OTHER VENOUS THROMBOSIS AND EMBOLISM: ICD-10-CM

## 2018-07-08 DIAGNOSIS — E46 UNSPECIFIED PROTEIN-CALORIE MALNUTRITION: ICD-10-CM
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
## 2018-07-08 DIAGNOSIS — Z79.01 LONG TERM (CURRENT) USE OF ANTICOAGULANTS: ICD-10-CM

## 2018-07-08 DIAGNOSIS — G47.00 INSOMNIA, UNSPECIFIED: ICD-10-CM

## 2018-07-08 PROCEDURE — 97163 PT EVAL HIGH COMPLEX 45 MIN: CPT

## 2018-07-08 PROCEDURE — 97116 GAIT TRAINING THERAPY: CPT

## 2018-07-08 PROCEDURE — 97167 OT EVAL HIGH COMPLEX 60 MIN: CPT

## 2018-07-08 PROCEDURE — 83735 ASSAY OF MAGNESIUM: CPT

## 2018-07-08 PROCEDURE — 85610 PROTHROMBIN TIME: CPT

## 2018-07-08 PROCEDURE — 97530 THERAPEUTIC ACTIVITIES: CPT

## 2018-07-08 PROCEDURE — 97140 MANUAL THERAPY 1/> REGIONS: CPT

## 2018-07-08 PROCEDURE — 83036 HEMOGLOBIN GLYCOSYLATED A1C: CPT

## 2018-07-08 PROCEDURE — 97535 SELF CARE MNGMENT TRAINING: CPT

## 2018-07-08 PROCEDURE — 80053 COMPREHEN METABOLIC PANEL: CPT

## 2018-07-08 PROCEDURE — 85025 COMPLETE CBC W/AUTO DIFF WBC: CPT

## 2018-07-08 PROCEDURE — 97110 THERAPEUTIC EXERCISES: CPT

## 2018-07-08 PROCEDURE — 71045 X-RAY EXAM CHEST 1 VIEW: CPT

## 2018-07-08 PROCEDURE — 85027 COMPLETE CBC AUTOMATED: CPT

## 2018-07-08 PROCEDURE — 93005 ELECTROCARDIOGRAM TRACING: CPT

## 2018-07-08 PROCEDURE — 80048 BASIC METABOLIC PNL TOTAL CA: CPT

## 2018-07-08 PROCEDURE — 84100 ASSAY OF PHOSPHORUS: CPT

## 2018-08-30 NOTE — PHYSICAL THERAPY INITIAL EVALUATION ADULT - NAME OF CLINICIAN
Test Reason : 

Blood Pressure : ***/*** mmHG

Vent. Rate : 065 BPM     Atrial Rate : 065 BPM

   P-R Int : 124 ms          QRS Dur : 096 ms

    QT Int : 386 ms       P-R-T Axes : 014 067 036 degrees

   QTc Int : 401 ms

 

NORMAL SINUS RHYTHM

EARLY REPOLARIZATION

NORMAL ECG

WHEN COMPARED WITH ECG OF 25-MAY-2017 22:38,

NO SIGNIFICANT CHANGE WAS FOUND

Confirmed by DWAYNE WEST MD ([id number]) on 8/30/2018 11:56:08 AM

 

Referred By:             Confirmed By:DWAYNE WEST MD Joana Lombardi RN

## 2018-11-22 NOTE — ED ADULT NURSE NOTE - NS ED NURSE DISCH DISPOSITION
Verified Results  CHLAMYDIA / GC BY NUCLEIC ACID AMPLIFICATION 25Sep2017 04:06PM JUSTIN ESCOBAR     Test Name Result Flag Reference   NEISSERIA GONORRHOEAE BY NUCLEIC ACID AMPLIFICATION NEGATIVE  NEGATIVE   CHLAMYDIA TRACHOMATIS BY NUCLEIC ACID AMPLIFICATION NEGATIVE  NEGATIVE   SOURCE URINE       URINALYSIS, COMPLETE INCLUDING MICROSCOPIC AND URINE CULTURE REFLEX 25Sep2017 04:06PM JUSTIN ESCOBAR     Test Name Result Flag Reference   URINE COLOR YELLOW  YELLOW   APPEARANCE, URINE CLEAR     URINE GLUCOSE NEGATIVE mg/dl  NEGATIVE   URINE BILIRUBIN NEGATIVE  NEGATIVE   KETONES NEGATIVE mg/dl  NEGATIVE   URINE SPECIFIC GRAVITY 1.013  1.005-1.030   RBC-URINE NEGATIVE  NEGATIVE   PH-URINE 5.0 Units  5.0-7.0   URINE PROTEIN 30 mg/dl A NEGATIVE   UROBILINOGEN-URINE 0.2 mg/dl  0.0-1.0   NITRITE NEGATIVE  NEGATIVE   WBC-URINE NEGATIVE  NEGATIVE   SQUAMOUS EPITHELIAL CELLS NONE SEEN /HPF  0-5   ERYTHROCYTES 1 to 3 /HPF  0-3   LEUKOCYTES 1 to 5 /HPF  0-5   BACTERIA FEW /HPF A NONE SEEN   HYALINE CASTS NONE SEEN /LPF  0-5   SPECIMEN TYPE      URINE, CLEAN CATCH/MIDSTREAM   MUCUS PRESENT         
Admitted

## 2019-03-25 ENCOUNTER — EMERGENCY (EMERGENCY)
Facility: HOSPITAL | Age: 84
LOS: 1 days | Discharge: HOME | End: 2019-03-25
Attending: STUDENT IN AN ORGANIZED HEALTH CARE EDUCATION/TRAINING PROGRAM
Payer: MEDICARE

## 2019-03-25 VITALS
HEIGHT: 72 IN | OXYGEN SATURATION: 99 % | RESPIRATION RATE: 18 BRPM | HEART RATE: 76 BPM | TEMPERATURE: 97 F | WEIGHT: 199.96 LBS | SYSTOLIC BLOOD PRESSURE: 153 MMHG | DIASTOLIC BLOOD PRESSURE: 79 MMHG

## 2019-03-25 DIAGNOSIS — Z98.49 CATARACT EXTRACTION STATUS, UNSPECIFIED EYE: Chronic | ICD-10-CM

## 2019-03-25 DIAGNOSIS — Z98.89 OTHER SPECIFIED POSTPROCEDURAL STATES: Chronic | ICD-10-CM

## 2019-03-25 DIAGNOSIS — Z90.49 ACQUIRED ABSENCE OF OTHER SPECIFIED PARTS OF DIGESTIVE TRACT: Chronic | ICD-10-CM

## 2019-03-25 DIAGNOSIS — Z90.79 ACQUIRED ABSENCE OF OTHER GENITAL ORGAN(S): Chronic | ICD-10-CM

## 2019-03-25 LAB
ALBUMIN SERPL ELPH-MCNC: 3.6 G/DL — SIGNIFICANT CHANGE UP (ref 3.3–5.2)
ALP SERPL-CCNC: 114 U/L — SIGNIFICANT CHANGE UP (ref 40–120)
ALT FLD-CCNC: 9 U/L — SIGNIFICANT CHANGE UP
ANION GAP SERPL CALC-SCNC: 11 MMOL/L — SIGNIFICANT CHANGE UP (ref 5–17)
APTT BLD: 30.1 SEC — SIGNIFICANT CHANGE UP (ref 27.5–36.3)
AST SERPL-CCNC: 14 U/L — SIGNIFICANT CHANGE UP
BASOPHILS # BLD AUTO: 0 K/UL — SIGNIFICANT CHANGE UP (ref 0–0.2)
BASOPHILS NFR BLD AUTO: 0.6 % — SIGNIFICANT CHANGE UP (ref 0–2)
BILIRUB SERPL-MCNC: 0.2 MG/DL — LOW (ref 0.4–2)
BUN SERPL-MCNC: 14 MG/DL — SIGNIFICANT CHANGE UP (ref 8–20)
CALCIUM SERPL-MCNC: 9.1 MG/DL — SIGNIFICANT CHANGE UP (ref 8.6–10.2)
CHLORIDE SERPL-SCNC: 107 MMOL/L — SIGNIFICANT CHANGE UP (ref 98–107)
CK SERPL-CCNC: 62 U/L — SIGNIFICANT CHANGE UP (ref 30–200)
CO2 SERPL-SCNC: 22 MMOL/L — SIGNIFICANT CHANGE UP (ref 22–29)
CREAT SERPL-MCNC: 0.91 MG/DL — SIGNIFICANT CHANGE UP (ref 0.5–1.3)
EOSINOPHIL # BLD AUTO: 0.2 K/UL — SIGNIFICANT CHANGE UP (ref 0–0.5)
EOSINOPHIL NFR BLD AUTO: 2.7 % — SIGNIFICANT CHANGE UP (ref 0–5)
GLUCOSE SERPL-MCNC: 105 MG/DL — SIGNIFICANT CHANGE UP (ref 70–115)
HCT VFR BLD CALC: 39.9 % — LOW (ref 42–52)
HGB BLD-MCNC: 13.4 G/DL — LOW (ref 14–18)
INR BLD: 0.97 RATIO — SIGNIFICANT CHANGE UP (ref 0.88–1.16)
LYMPHOCYTES # BLD AUTO: 2.1 K/UL — SIGNIFICANT CHANGE UP (ref 1–4.8)
LYMPHOCYTES # BLD AUTO: 32 % — SIGNIFICANT CHANGE UP (ref 20–55)
MCHC RBC-ENTMCNC: 32.9 PG — HIGH (ref 27–31)
MCHC RBC-ENTMCNC: 33.6 G/DL — SIGNIFICANT CHANGE UP (ref 32–36)
MCV RBC AUTO: 98 FL — HIGH (ref 80–94)
MONOCYTES # BLD AUTO: 0.7 K/UL — SIGNIFICANT CHANGE UP (ref 0–0.8)
MONOCYTES NFR BLD AUTO: 11.2 % — HIGH (ref 3–10)
NEUTROPHILS # BLD AUTO: 3.5 K/UL — SIGNIFICANT CHANGE UP (ref 1.8–8)
NEUTROPHILS NFR BLD AUTO: 53.3 % — SIGNIFICANT CHANGE UP (ref 37–73)
PLATELET # BLD AUTO: 212 K/UL — SIGNIFICANT CHANGE UP (ref 150–400)
POTASSIUM SERPL-MCNC: 3.8 MMOL/L — SIGNIFICANT CHANGE UP (ref 3.5–5.3)
POTASSIUM SERPL-SCNC: 3.8 MMOL/L — SIGNIFICANT CHANGE UP (ref 3.5–5.3)
PROT SERPL-MCNC: 6.7 G/DL — SIGNIFICANT CHANGE UP (ref 6.6–8.7)
PROTHROM AB SERPL-ACNC: 11.2 SEC — SIGNIFICANT CHANGE UP (ref 10–12.9)
RBC # BLD: 4.07 M/UL — LOW (ref 4.6–6.2)
RBC # FLD: 13.7 % — SIGNIFICANT CHANGE UP (ref 11–15.6)
SODIUM SERPL-SCNC: 140 MMOL/L — SIGNIFICANT CHANGE UP (ref 135–145)
TROPONIN T SERPL-MCNC: <0.01 NG/ML — SIGNIFICANT CHANGE UP (ref 0–0.06)
WBC # BLD: 6.6 K/UL — SIGNIFICANT CHANGE UP (ref 4.8–10.8)
WBC # FLD AUTO: 6.6 K/UL — SIGNIFICANT CHANGE UP (ref 4.8–10.8)

## 2019-03-25 PROCEDURE — 71045 X-RAY EXAM CHEST 1 VIEW: CPT | Mod: 26

## 2019-03-25 PROCEDURE — 99284 EMERGENCY DEPT VISIT MOD MDM: CPT | Mod: 25

## 2019-03-25 PROCEDURE — 83880 ASSAY OF NATRIURETIC PEPTIDE: CPT

## 2019-03-25 PROCEDURE — 99284 EMERGENCY DEPT VISIT MOD MDM: CPT

## 2019-03-25 PROCEDURE — 93010 ELECTROCARDIOGRAM REPORT: CPT

## 2019-03-25 PROCEDURE — 80053 COMPREHEN METABOLIC PANEL: CPT

## 2019-03-25 PROCEDURE — 71045 X-RAY EXAM CHEST 1 VIEW: CPT

## 2019-03-25 PROCEDURE — 36415 COLL VENOUS BLD VENIPUNCTURE: CPT

## 2019-03-25 PROCEDURE — 85027 COMPLETE CBC AUTOMATED: CPT

## 2019-03-25 PROCEDURE — 85730 THROMBOPLASTIN TIME PARTIAL: CPT

## 2019-03-25 PROCEDURE — 84484 ASSAY OF TROPONIN QUANT: CPT

## 2019-03-25 PROCEDURE — 93005 ELECTROCARDIOGRAM TRACING: CPT

## 2019-03-25 PROCEDURE — 85610 PROTHROMBIN TIME: CPT

## 2019-03-25 PROCEDURE — 93971 EXTREMITY STUDY: CPT

## 2019-03-25 PROCEDURE — 82550 ASSAY OF CK (CPK): CPT

## 2019-03-25 NOTE — ED STATDOCS - PROGRESS NOTE DETAILS
Naeem Anderson for Dr Sumaya Saunders : Pt is an 84 y/o M, with PMHx of AAA, right iliac aneurysm, dementia, HTN, and prostate CA, s/p left AKA s/p aneurysm, presenting to the ED with c/o right foot and calf swelling. Hx obtained by daughter. She states the pt had the leg evaluated two weeks ago by his PMD and it was mildly swollen at that time. Reports since then, the swelling had progressively become worse. Swelling is from the mid calf down to the foot. Pt ambulates with assistance of a walker but wife notes that it has been difficult for him recently. Denies CP, SOB, and fever. Pt is on eliquis BID. Sent to [facility] for further evaluation.   PE: + DP pulses on doppler.

## 2019-03-25 NOTE — ED STATDOCS - PMH
AAA (abdominal aortic aneurysm)    AAA (abdominal aortic aneurysm)  5cm  Right iliac aneurysm 4cm  Alzheimer disease    Alzheimer disease    Cataract    Cataract of both eyes    DVT (deep venous thrombosis)    DVT (deep venous thrombosis)    Gallstones    Hypertension    Prostate CA    Prostate ca

## 2019-03-25 NOTE — ED STATDOCS - PSH
H/O prostatectomy    H/O prostatectomy  1998  History of cataract surgery    History of cholecystectomy    History of cholecystectomy  1997  Status post cataract surgery  bilateral

## 2019-03-26 VITALS
HEART RATE: 70 BPM | TEMPERATURE: 97 F | SYSTOLIC BLOOD PRESSURE: 127 MMHG | RESPIRATION RATE: 19 BRPM | OXYGEN SATURATION: 96 % | DIASTOLIC BLOOD PRESSURE: 65 MMHG

## 2019-03-26 PROBLEM — C61 MALIGNANT NEOPLASM OF PROSTATE: Chronic | Status: ACTIVE | Noted: 2018-05-20

## 2019-03-26 PROBLEM — I82.409 ACUTE EMBOLISM AND THROMBOSIS OF UNSPECIFIED DEEP VEINS OF UNSPECIFIED LOWER EXTREMITY: Chronic | Status: ACTIVE | Noted: 2018-05-19

## 2019-03-26 PROBLEM — H26.9 UNSPECIFIED CATARACT: Chronic | Status: ACTIVE | Noted: 2018-05-20

## 2019-03-26 PROBLEM — I82.409 ACUTE EMBOLISM AND THROMBOSIS OF UNSPECIFIED DEEP VEINS OF UNSPECIFIED LOWER EXTREMITY: Chronic | Status: ACTIVE | Noted: 2018-05-20

## 2019-03-26 LAB — NT-PROBNP SERPL-SCNC: 250 PG/ML — SIGNIFICANT CHANGE UP (ref 0–300)

## 2019-03-26 PROCEDURE — 93971 EXTREMITY STUDY: CPT | Mod: 26,RT

## 2019-03-26 RX ORDER — FUROSEMIDE 40 MG
1 TABLET ORAL
Qty: 3 | Refills: 0
Start: 2019-03-26 | End: 2019-03-28

## 2019-03-26 NOTE — ED PROVIDER NOTE - PRINCIPAL DIAGNOSIS
Leg swelling normal... Well appearing, well nourished, awake, alert, oriented to person, place, time/situation and in no apparent distress.

## 2019-03-26 NOTE — ED PROVIDER NOTE - OBJECTIVE STATEMENT
Patient is a 89 y/o male with a pmhx of Alzheimer disease, HTN, DVT, right iliac aneurysm, prostate CA, prsenting tot he ED with right foot and calf swelling, Hx obtained by wife. Wife states the patient had his right leg evaluated by his PMD two weeks ago, mildly swollen at this time. Wife states leg has become progressively worse in the past week (swelling increased. Patient denies current pain. Patient curerntly on eliquis. Patient has vascular appointment this Thursday. Patient denies hx of PE in the past, denies IVC filter. Patient denies cp, SOB, fevers, nausea, vomiting, abd pain.

## 2019-03-26 NOTE — ED PROVIDER NOTE - PHYSICAL EXAMINATION
Const: Awake, alert and oriented. In no acute distress. Well appearing.  HEENT: NC/AT. Moist mucous membranes.  Eyes: No scleral icterus. EOMI.  Neck:. Soft and supple. Full ROM without pain.  Cardiac: +S1/S2. No murmurs.   Resp: Speaking in full sentences. No evidence of respiratory distress. No wheezes, rales or rhonchi.  Abd: Soft, non-tender, non-distended. Normal bowel sounds in all 4 quadrants. No guarding or rebound.  Back: Spine midline and non-tender. No CVAT.  MSK: Left leg amputation, tenderness of right calf on palpation, FROM in all extremities, DP palpable, neurovasculary intact   Skin: Swelling from right mid calf down to the foot  Lymph: No cervical lymphadenopathy.  Neuro: Awake, alert & oriented x 3. CN II-XII intact, Moves all extremities symmetrically.

## 2019-03-26 NOTE — ED PROVIDER NOTE - ATTENDING CONTRIBUTION TO CARE
89 yo male presenting with persistent but mild swelling and discomfort. I personally saw the patient with the PA, and completed the key components of the history and physical exam. I then discussed the management plan with the PA.

## 2019-11-20 ENCOUNTER — TRANSCRIPTION ENCOUNTER (OUTPATIENT)
Age: 84
End: 2019-11-20

## 2019-11-20 ENCOUNTER — INPATIENT (INPATIENT)
Facility: HOSPITAL | Age: 84
LOS: 0 days | Discharge: HOME | DRG: 202 | End: 2019-11-21
Attending: FAMILY MEDICINE | Admitting: FAMILY MEDICINE
Payer: COMMERCIAL

## 2019-11-20 VITALS
SYSTOLIC BLOOD PRESSURE: 128 MMHG | TEMPERATURE: 97 F | OXYGEN SATURATION: 98 % | HEART RATE: 55 BPM | WEIGHT: 199.96 LBS | RESPIRATION RATE: 18 BRPM | DIASTOLIC BLOOD PRESSURE: 54 MMHG

## 2019-11-20 DIAGNOSIS — N17.9 ACUTE KIDNEY FAILURE, UNSPECIFIED: ICD-10-CM

## 2019-11-20 DIAGNOSIS — Z98.89 OTHER SPECIFIED POSTPROCEDURAL STATES: Chronic | ICD-10-CM

## 2019-11-20 DIAGNOSIS — Z86.718 PERSONAL HISTORY OF OTHER VENOUS THROMBOSIS AND EMBOLISM: ICD-10-CM

## 2019-11-20 DIAGNOSIS — Z98.49 CATARACT EXTRACTION STATUS, UNSPECIFIED EYE: Chronic | ICD-10-CM

## 2019-11-20 DIAGNOSIS — Z29.9 ENCOUNTER FOR PROPHYLACTIC MEASURES, UNSPECIFIED: ICD-10-CM

## 2019-11-20 DIAGNOSIS — I71.4 ABDOMINAL AORTIC ANEURYSM, WITHOUT RUPTURE: ICD-10-CM

## 2019-11-20 DIAGNOSIS — Z90.79 ACQUIRED ABSENCE OF OTHER GENITAL ORGAN(S): Chronic | ICD-10-CM

## 2019-11-20 DIAGNOSIS — G30.9 ALZHEIMER'S DISEASE, UNSPECIFIED: ICD-10-CM

## 2019-11-20 DIAGNOSIS — J40 BRONCHITIS, NOT SPECIFIED AS ACUTE OR CHRONIC: ICD-10-CM

## 2019-11-20 DIAGNOSIS — Z90.49 ACQUIRED ABSENCE OF OTHER SPECIFIED PARTS OF DIGESTIVE TRACT: Chronic | ICD-10-CM

## 2019-11-20 DIAGNOSIS — I10 ESSENTIAL (PRIMARY) HYPERTENSION: ICD-10-CM

## 2019-11-20 LAB
ANION GAP SERPL CALC-SCNC: 3 MMOL/L — LOW (ref 5–17)
APPEARANCE UR: CLEAR — SIGNIFICANT CHANGE UP
BASOPHILS # BLD AUTO: 0.07 K/UL — SIGNIFICANT CHANGE UP (ref 0–0.2)
BASOPHILS NFR BLD AUTO: 0.9 % — SIGNIFICANT CHANGE UP (ref 0–2)
BILIRUB UR-MCNC: NEGATIVE — SIGNIFICANT CHANGE UP
BUN SERPL-MCNC: 30 MG/DL — HIGH (ref 7–23)
CALCIUM SERPL-MCNC: 8.8 MG/DL — SIGNIFICANT CHANGE UP (ref 8.5–10.1)
CHLORIDE SERPL-SCNC: 111 MMOL/L — HIGH (ref 96–108)
CO2 SERPL-SCNC: 30 MMOL/L — SIGNIFICANT CHANGE UP (ref 22–31)
COLOR SPEC: YELLOW — SIGNIFICANT CHANGE UP
CREAT SERPL-MCNC: 1.7 MG/DL — HIGH (ref 0.5–1.3)
DIFF PNL FLD: NEGATIVE — SIGNIFICANT CHANGE UP
EOSINOPHIL # BLD AUTO: 0.26 K/UL — SIGNIFICANT CHANGE UP (ref 0–0.5)
EOSINOPHIL NFR BLD AUTO: 3.3 % — SIGNIFICANT CHANGE UP (ref 0–6)
GLUCOSE SERPL-MCNC: 98 MG/DL — SIGNIFICANT CHANGE UP (ref 70–99)
GLUCOSE UR QL: NEGATIVE — SIGNIFICANT CHANGE UP
HCT VFR BLD CALC: 41.8 % — SIGNIFICANT CHANGE UP (ref 39–50)
HGB BLD-MCNC: 13.6 G/DL — SIGNIFICANT CHANGE UP (ref 13–17)
IMM GRANULOCYTES NFR BLD AUTO: 0.3 % — SIGNIFICANT CHANGE UP (ref 0–1.5)
KETONES UR-MCNC: NEGATIVE — SIGNIFICANT CHANGE UP
LACTATE SERPL-SCNC: 1.9 MMOL/L — SIGNIFICANT CHANGE UP (ref 0.7–2)
LEUKOCYTE ESTERASE UR-ACNC: NEGATIVE — SIGNIFICANT CHANGE UP
LYMPHOCYTES # BLD AUTO: 1.93 K/UL — SIGNIFICANT CHANGE UP (ref 1–3.3)
LYMPHOCYTES # BLD AUTO: 24.7 % — SIGNIFICANT CHANGE UP (ref 13–44)
MCHC RBC-ENTMCNC: 32.5 GM/DL — SIGNIFICANT CHANGE UP (ref 32–36)
MCHC RBC-ENTMCNC: 32.5 PG — SIGNIFICANT CHANGE UP (ref 27–34)
MCV RBC AUTO: 100 FL — SIGNIFICANT CHANGE UP (ref 80–100)
MONOCYTES # BLD AUTO: 0.68 K/UL — SIGNIFICANT CHANGE UP (ref 0–0.9)
MONOCYTES NFR BLD AUTO: 8.7 % — SIGNIFICANT CHANGE UP (ref 2–14)
NEUTROPHILS # BLD AUTO: 4.86 K/UL — SIGNIFICANT CHANGE UP (ref 1.8–7.4)
NEUTROPHILS NFR BLD AUTO: 62.1 % — SIGNIFICANT CHANGE UP (ref 43–77)
NITRITE UR-MCNC: NEGATIVE — SIGNIFICANT CHANGE UP
NRBC # BLD: 0 /100 WBCS — SIGNIFICANT CHANGE UP (ref 0–0)
PH UR: 5 — SIGNIFICANT CHANGE UP (ref 5–8)
PLATELET # BLD AUTO: 191 K/UL — SIGNIFICANT CHANGE UP (ref 150–400)
POTASSIUM SERPL-MCNC: 4 MMOL/L — SIGNIFICANT CHANGE UP (ref 3.5–5.3)
POTASSIUM SERPL-SCNC: 4 MMOL/L — SIGNIFICANT CHANGE UP (ref 3.5–5.3)
PROCALCITONIN SERPL-MCNC: 0.09 NG/ML — HIGH (ref 0–0.04)
PROT UR-MCNC: NEGATIVE — SIGNIFICANT CHANGE UP
RBC # BLD: 4.18 M/UL — LOW (ref 4.2–5.8)
RBC # FLD: 13.1 % — SIGNIFICANT CHANGE UP (ref 10.3–14.5)
SODIUM SERPL-SCNC: 144 MMOL/L — SIGNIFICANT CHANGE UP (ref 135–145)
SP GR SPEC: 1.01 — SIGNIFICANT CHANGE UP (ref 1.01–1.02)
UROBILINOGEN FLD QL: NEGATIVE — SIGNIFICANT CHANGE UP
WBC # BLD: 7.82 K/UL — SIGNIFICANT CHANGE UP (ref 3.8–10.5)
WBC # FLD AUTO: 7.82 K/UL — SIGNIFICANT CHANGE UP (ref 3.8–10.5)

## 2019-11-20 PROCEDURE — 93010 ELECTROCARDIOGRAM REPORT: CPT

## 2019-11-20 PROCEDURE — 99285 EMERGENCY DEPT VISIT HI MDM: CPT

## 2019-11-20 PROCEDURE — 71046 X-RAY EXAM CHEST 2 VIEWS: CPT | Mod: 26

## 2019-11-20 PROCEDURE — 99223 1ST HOSP IP/OBS HIGH 75: CPT | Mod: GC

## 2019-11-20 RX ORDER — DEXAMETHASONE 0.5 MG/5ML
5 ELIXIR ORAL ONCE
Refills: 0 | Status: COMPLETED | OUTPATIENT
Start: 2019-11-20 | End: 2019-11-20

## 2019-11-20 RX ORDER — ASPIRIN/CALCIUM CARB/MAGNESIUM 324 MG
81 TABLET ORAL DAILY
Refills: 0 | Status: DISCONTINUED | OUTPATIENT
Start: 2019-11-20 | End: 2019-11-21

## 2019-11-20 RX ORDER — MEMANTINE HYDROCHLORIDE 10 MG/1
1 TABLET ORAL
Qty: 0 | Refills: 0 | DISCHARGE

## 2019-11-20 RX ORDER — APIXABAN 2.5 MG/1
2.5 TABLET, FILM COATED ORAL EVERY 12 HOURS
Refills: 0 | Status: DISCONTINUED | OUTPATIENT
Start: 2019-11-21 | End: 2019-11-21

## 2019-11-20 RX ORDER — MEMANTINE HYDROCHLORIDE 10 MG/1
10 TABLET ORAL
Refills: 0 | Status: DISCONTINUED | OUTPATIENT
Start: 2019-11-20 | End: 2019-11-21

## 2019-11-20 RX ORDER — SODIUM CHLORIDE 9 MG/ML
1000 INJECTION INTRAMUSCULAR; INTRAVENOUS; SUBCUTANEOUS
Refills: 0 | Status: DISCONTINUED | OUTPATIENT
Start: 2019-11-20 | End: 2019-11-21

## 2019-11-20 RX ORDER — ATORVASTATIN CALCIUM 80 MG/1
10 TABLET, FILM COATED ORAL AT BEDTIME
Refills: 0 | Status: DISCONTINUED | OUTPATIENT
Start: 2019-11-20 | End: 2019-11-21

## 2019-11-20 RX ORDER — DONEPEZIL HYDROCHLORIDE 10 MG/1
1 TABLET, FILM COATED ORAL
Qty: 0 | Refills: 0 | DISCHARGE

## 2019-11-20 RX ORDER — CHOLECALCIFEROL (VITAMIN D3) 125 MCG
0 CAPSULE ORAL
Qty: 0 | Refills: 0 | DISCHARGE

## 2019-11-20 RX ORDER — APIXABAN 2.5 MG/1
1 TABLET, FILM COATED ORAL
Qty: 0 | Refills: 0 | DISCHARGE

## 2019-11-20 RX ORDER — APIXABAN 2.5 MG/1
5 TABLET, FILM COATED ORAL EVERY 12 HOURS
Refills: 0 | Status: DISCONTINUED | OUTPATIENT
Start: 2019-11-20 | End: 2019-11-20

## 2019-11-20 RX ORDER — ATORVASTATIN CALCIUM 80 MG/1
1 TABLET, FILM COATED ORAL
Qty: 0 | Refills: 0 | DISCHARGE

## 2019-11-20 RX ORDER — MULTIVIT-MIN/FERROUS GLUCONATE 9 MG/15 ML
1 LIQUID (ML) ORAL
Qty: 0 | Refills: 0 | DISCHARGE

## 2019-11-20 RX ORDER — DONEPEZIL HYDROCHLORIDE 10 MG/1
10 TABLET, FILM COATED ORAL AT BEDTIME
Refills: 0 | Status: DISCONTINUED | OUTPATIENT
Start: 2019-11-20 | End: 2019-11-21

## 2019-11-20 RX ORDER — IPRATROPIUM/ALBUTEROL SULFATE 18-103MCG
3 AEROSOL WITH ADAPTER (GRAM) INHALATION ONCE
Refills: 0 | Status: COMPLETED | OUTPATIENT
Start: 2019-11-20 | End: 2019-11-20

## 2019-11-20 RX ORDER — METOPROLOL TARTRATE 50 MG
0.5 TABLET ORAL
Qty: 0 | Refills: 0 | DISCHARGE

## 2019-11-20 RX ORDER — ASPIRIN/CALCIUM CARB/MAGNESIUM 324 MG
1 TABLET ORAL
Qty: 0 | Refills: 0 | DISCHARGE

## 2019-11-20 RX ORDER — LISINOPRIL 2.5 MG/1
10 TABLET ORAL DAILY
Refills: 0 | Status: DISCONTINUED | OUTPATIENT
Start: 2019-11-20 | End: 2019-11-20

## 2019-11-20 RX ORDER — SODIUM CHLORIDE 9 MG/ML
1000 INJECTION INTRAMUSCULAR; INTRAVENOUS; SUBCUTANEOUS ONCE
Refills: 0 | Status: COMPLETED | OUTPATIENT
Start: 2019-11-20 | End: 2019-11-20

## 2019-11-20 RX ORDER — ALBUTEROL 90 UG/1
2.5 AEROSOL, METERED ORAL EVERY 6 HOURS
Refills: 0 | Status: DISCONTINUED | OUTPATIENT
Start: 2019-11-20 | End: 2019-11-21

## 2019-11-20 RX ORDER — METOPROLOL TARTRATE 50 MG
12.5 TABLET ORAL DAILY
Refills: 0 | Status: DISCONTINUED | OUTPATIENT
Start: 2019-11-20 | End: 2019-11-21

## 2019-11-20 RX ADMIN — DONEPEZIL HYDROCHLORIDE 10 MILLIGRAM(S): 10 TABLET, FILM COATED ORAL at 22:08

## 2019-11-20 RX ADMIN — Medication 3 MILLILITER(S): at 15:32

## 2019-11-20 RX ADMIN — Medication 5 MILLIGRAM(S): at 15:03

## 2019-11-20 RX ADMIN — SODIUM CHLORIDE 1000 MILLILITER(S): 9 INJECTION INTRAMUSCULAR; INTRAVENOUS; SUBCUTANEOUS at 15:30

## 2019-11-20 RX ADMIN — APIXABAN 5 MILLIGRAM(S): 2.5 TABLET, FILM COATED ORAL at 18:27

## 2019-11-20 RX ADMIN — SODIUM CHLORIDE 50 MILLILITER(S): 9 INJECTION INTRAMUSCULAR; INTRAVENOUS; SUBCUTANEOUS at 22:52

## 2019-11-20 RX ADMIN — MEMANTINE HYDROCHLORIDE 10 MILLIGRAM(S): 10 TABLET ORAL at 18:27

## 2019-11-20 RX ADMIN — ATORVASTATIN CALCIUM 10 MILLIGRAM(S): 80 TABLET, FILM COATED ORAL at 22:09

## 2019-11-20 RX ADMIN — Medication 20 MILLIGRAM(S): at 22:09

## 2019-11-20 NOTE — H&P ADULT - NSICDXPASTMEDICALHX_GEN_ALL_CORE_FT
PAST MEDICAL HISTORY:  AAA (abdominal aortic aneurysm) 5cm  Right iliac aneurysm 4cm    Alzheimer disease     Cataract of both eyes     DVT (deep venous thrombosis)     DVT (deep venous thrombosis)     Gallstones     Hypertension     Prostate CA

## 2019-11-20 NOTE — H&P ADULT - NSICDXFAMILYHX_GEN_ALL_CORE_FT
FAMILY HISTORY:  Family history of heart disease    Sibling  Still living? No  Family history of Alzheimer's disease, Age at diagnosis: Age Unknown

## 2019-11-20 NOTE — H&P ADULT - PROBLEM SELECTOR PLAN 3
-Last CT in EMR shows AAA of 4.8cm in 5/2019  -Recently had CT angio 1 week with Dr. Rausch, results not yet known  -Continue asa 81mg

## 2019-11-20 NOTE — ED PROVIDER NOTE - CARE PLAN
Assessment and plan of treatment:	89yo M h/o AAA, dvt on eliquis p/w cough nonproductive x 3 days. denies f/c/n/v/d/cp/swelling. Principal Discharge DX:	Acute kidney injury  Assessment and plan of treatment:	89yo M h/o AAA, dvt on eliquis p/w cough nonproductive x 3 days. denies f/c/n/v/d/cp/swelling.  Secondary Diagnosis:	Bronchitis

## 2019-11-20 NOTE — ED ADULT TRIAGE NOTE - CHIEF COMPLAINT QUOTE
Pt here from home with family who states that he has had a non productive cough for 3 days, he went to his PCP and they did a chest x-ray which showed right lower lung congestion.

## 2019-11-20 NOTE — H&P ADULT - ASSESSMENT
Patient is a 89y/o M with PMHx of AAA (last documented CT showed 4.8 cm in 5/2018, had CT 1 week prior, results unknown), Alzheimer's disease, DVT (April 2018, requiring L AKA, now on eliquis), HTN, prostate ca s/p prostatectomy who presents with nonproductive cough x3 days. Admitted for bronchitis and DILLON.

## 2019-11-20 NOTE — H&P ADULT - NSHPREVIEWOFSYSTEMS_GEN_ALL_CORE
CONSTITUTIONAL: denies fever, chills, fatigue, weakness  HEENT: denies blurred vision, sore throat  CARDIOVASCULAR: denies chest pain, chest pressure, palpitations  RESPIRATORY: admits cough, denies shortness of breath, sputum production  GASTROINTESTINAL: denies nausea, vomiting, diarrhea, abdominal pain  GENITOURINARY: denies dysuria, discharge  NEUROLOGICAL: denies numbness, headache, focal weakness  MUSCULOSKELETAL: denies new joint pain, muscle aches  HEMATOLOGIC: admits bruising 2/2 eliquis

## 2019-11-20 NOTE — H&P ADULT - NSHPPHYSICALEXAM_GEN_ALL_CORE
T(C): 36.8 (11-20-19 @ 16:20), Max: 36.8 (11-20-19 @ 16:20)  HR: 58 (11-20-19 @ 16:20) (55 - 58)  BP: 130/60 (11-20-19 @ 16:20) (128/54 - 130/60)  RR: 16 (11-20-19 @ 16:20) (16 - 18)  SpO2: 97% (11-20-19 @ 16:20) (97% - 98%)    GENERAL: patient appears well, no acute distress, appropriate, pleasant  EYES: sclera clear, no exudates  ENMT: oropharynx clear without erythema, no exudates, dry mucous membranes  NECK: supple, soft  LUNGS: good air entry bilaterally, clear to auscultation, symmetric breath sounds, no wheezing or rhonchi appreciated  HEART: soft S1/S2, regular rate and rhythm, no murmurs noted, no lower extremity edema  GASTROINTESTINAL: abdomen is soft, nontender, nondistended, normoactive bowel sounds, no palpable masses  INTEGUMENT: warm skin, appears well perfused  MUSCULOSKELETAL: no clubbing or cyanosis, no obvious deformity  NEUROLOGIC: awake, alert, oriented x2-3, good muscle tone in 4 extremities, no obvious sensory deficits T(C): 36.8 (11-20-19 @ 16:20), Max: 36.8 (11-20-19 @ 16:20)  HR: 58 (11-20-19 @ 16:20) (55 - 58)  BP: 130/60 (11-20-19 @ 16:20) (128/54 - 130/60)  RR: 16 (11-20-19 @ 16:20) (16 - 18)  SpO2: 97% (11-20-19 @ 16:20) (97% - 98%)    GENERAL: patient appears well, no acute distress, appropriate, pleasant  EYES: sclera clear, no exudates  ENMT: oropharynx clear without erythema, no exudates, dry mucous membranes  NECK: supple, soft  LUNGS: good air entry bilaterally, clear to auscultation, symmetric breath sounds, no wheezing or rhonchi appreciated  HEART: soft S1/S2, regular rate and rhythm, no murmurs noted, no lower extremity edema  GASTROINTESTINAL: abdomen is soft, nontender, nondistended, normoactive bowel sounds, no palpable masses  INTEGUMENT: warm skin, appears well perfused  MUSCULOSKELETAL: Left AKA, no clubbing or cyanosis, no obvious deformity  NEUROLOGIC: awake, alert, oriented x2-3, good muscle tone in 4 extremities, no obvious sensory deficits

## 2019-11-20 NOTE — H&P ADULT - PROBLEM SELECTOR PLAN 6
-Chronic, stable  -Continue torsemide, toprol, lisinopril with hold parameters -Chronic, stable  -Continue toprol with hold parameters  -Hold home torsemide and lisinopril 2/2 DILLON

## 2019-11-20 NOTE — ED PROVIDER NOTE - CLINICAL SUMMARY MEDICAL DECISION MAKING FREE TEXT BOX
89yo M h/o AAA, dvt on eliquis p/w cough nonproductive x 3 days. denies f/c/n/v/d/cp/swelling. 89yo M h/o AAA, dvt on eliquis p/w cough nonproductive x 3 days. denies f/c/n/v/d/cp/swelling.  labs sig for DILLON, cxr neg, improved w/ nebs, pt admitted for iv hydration, further eval and mgmt

## 2019-11-20 NOTE — H&P ADULT - ATTENDING COMMENTS
Patient seen and examined. Case discussed with resident and I agree with documentation above, edited where necessary.     Discussed plan of care with patient and his wife re: steroids, nebulizer treatments, IV fluids, and monitoring kidney function, anticipated hospital course.

## 2019-11-20 NOTE — ED ADULT NURSE REASSESSMENT NOTE - NS ED NURSE REASSESS COMMENT FT1
Received report from Long GOMEZ. Patient resting in bed. Family bedside. Safety and comfort measures provided and maintained.

## 2019-11-20 NOTE — ED PROVIDER NOTE - NS ED ROS FT
GEN: no fever, no chills, no weakness  HENT: no eye pain, no visual changes, no ear pain, no visual or hearing changes, no sore throat, no swelling or neck pain  CV: no chest pain, no palpitations, no dizziness, no swelling  RESP: +coughing, no sob, no IWOB, no GARDNER  GI: no abd pain, no distension, no nausea, no vomiting, no diarrhea, no constipation  : no dysuria,  no frequency, no hematuria, no discharge, no flank pain  MUSCULOSKELETAL: no myalgia, no arthralgia, no joint swelling, no bruising   SKIN: no rash, no wounds, no itching  NEURO: no change in mentation, no visual changes, no HA, no focal weakness, no trouble speaking, no gait abnormalities, no dizziness  PSYCH: no suidical ideation, no homicidal ideation, no depression, no anxiety, no hallucinations

## 2019-11-20 NOTE — H&P ADULT - HISTORY OF PRESENT ILLNESS
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 Patient was seen in UC today and advised to come to the ED for further evaluation. Per wife, patient would have coughing fits with subsequently difficulty breathing that resolved with cessation of cough. Patient denies any chest pain, nausea, vomiting, fever, chills, SOB, wheezing, sore throat.     In the ED, patient's vitals were temp 98.2, HR 58, /60, RR 16, O2 97%.  Labs were significant for BUN/Cr 30/1.70, GFR 35, procalc 0.09  UA: negative  CXR: negative  EKG: pending  Patient received 1L NS bolus, duoneb x1, decadron x1. 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 Patient was seen in UC today and advised to come to the ED for further evaluation. Per wife, patient would have coughing fits with subsequently difficulty breathing that resolves once he stops coughing. Patient denies any chest pain, nausea, vomiting, fever, chills, SOB, wheezing, sore throat.     In the ED, patient's vitals were temp 98.2, HR 58, /60, RR 16, O2 97%.  Labs were significant for BUN/Cr 30/1.70, GFR 35, procalc 0.09  UA: negative  CXR: negative  EKG: pending  Patient received 1L NS bolus, duoneb x1, decadron x1. 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 Patient was seen in UC today and advised to come to the ED for further evaluation. Per wife, patient would have coughing fits with subsequently difficulty breathing that resolves once he stops coughing. Patient denies any chest pain, nausea, vomiting, fever, chills, SOB, wheezing, sore throat.     In the ED, patient's vitals were temp 98.2, HR 58, /60, RR 16, O2 97%.  Labs were significant for BUN/Cr 30/1.70, GFR 35, procalc 0.09  UA: negative  CXR: negative  EKG: NSR at 77bpm. RBBB and L anterior fascicular block, mostly unchanged from prior  Patient received 1L NS bolus, duoneb x1, decadron x1.

## 2019-11-20 NOTE — H&P ADULT - PROBLEM SELECTOR PLAN 7
IMPROVE VTE Individual Risk Assessment          RISK                                                          Points  [ x] Previous VTE                                                3  [  ] Thrombophilia                                             2  [  ] Lower limb paralysis                                   2        (unable to hold up >15 seconds)    [  ] Current Cancer                                             2         (within 6 months)  [  ] Immobilization > 24 hrs                              1  [  ] ICU/CCU stay > 24 hours                             1  [x ] Age > 60                                                         1    IMPROVE VTE Score: 4    Patient on eliquis BID

## 2019-11-20 NOTE — H&P ADULT - NSHPSOCIALHISTORY_GEN_ALL_CORE
Marital Status:   Lives with: wife  Tobacco Use: Former smoker  Alcohol Use: Denies  Substance Use: Denies Marital Status:   Lives with: wife, 24/7 A  Tobacco Use: Former smoker  Alcohol Use: Denies  Substance Use: Denies  Uses a wheelchair, has a prosthetic leg that he wears occasionally

## 2019-11-20 NOTE — H&P ADULT - PROBLEM SELECTOR PLAN 1
-Patient with cough x3 days, with SOB during coughing fits  -S/p duoneb x1 and dexamethasone x1 in the ED  -Will start prednisone  -Unlikely pneumonia as CXR negative, no fevers, WBC WNL -Patient with cough x3 days, with SOB during coughing fits  -S/p duoneb x1 and dexamethasone x1 in the ED  -Will start prednisone 20mg qd for 5 days  -Unlikely pneumonia as CXR negative, no fevers, WBC WNL -Patient with cough x3 days, with SOB during coughing fits  -S/p duoneb x1 and dexamethasone x1 in the ED  -Will start prednisone 20mg qd for 5 days  -Unlikely pneumonia as CXR negative, no fevers, WBC WNL  -RVP pending, f/u

## 2019-11-20 NOTE — H&P ADULT - PROBLEM SELECTOR PLAN 2
-Likely 2/2 dehydration as patient with poor fluid intake and on diuretic  -BUN/Cr is 30/1.7, baseline Cr 0.8-1.0  -S/p 1L NS bolus in the ED  -Monitor BMP -Likely 2/2 dehydration as patient with poor fluid intake and on diuretic  -BUN/Cr is 30/1.7, baseline Cr 0.8-1.0  -S/p 1L NS bolus in the ED  -IVF gentle hydration  -Monitor BMP -Likely 2/2 dehydration as patient with poor fluid intake and on diuretic  -Hold Torsemide, hold Lisinopril.  -BUN/Cr is 30/1.7, baseline Cr 0.8-1.0  -S/p 1L NS bolus in the ED  -IVF gentle hydration  -Monitor BMP

## 2019-11-20 NOTE — ED PROVIDER NOTE - PHYSICAL EXAMINATION
GEN: awake, alert, well appearing, NAD   HENT: atraumatic, normocephalic, ANTHONY, EOMI, no midline instability, oropharynx w/o erythema or exudates, no lymphadenopathy  CV: normal rate and rhythm, S1, S2, no MRG, equal pulses throughout, no JVD  RESP: no distress, no IWOB, no retraction, wheezing   ABD: soft, nontender, nondistended, no rebound, no guarding, normoactive bowel sounds, no organomegally  MUSCULOSKELETAL: strenght 5/5 x 4, full range of motion, CMS intact   SKIN: normal color, no turgor, no wounds or rash   NEURO: Awake alert oriented x 3, no facial asymmetry, no slurred speech, no pronator drift, moving all extremities  PSYCH: no suicial ideation, no homicidal ideation, no depression, no anxiety, no hallucination

## 2019-11-20 NOTE — PHARMACOTHERAPY INTERVENTION NOTE - COMMENTS
Confirmed that patient takes Eliquis 2.5mg BID outpatient for h/o DVT in April 2018. Recommended continuing home dose of 2.5mg BID as DVT >6 months ago; provider agreed.

## 2019-11-20 NOTE — ED ADULT NURSE NOTE - NSIMPLEMENTINTERV_GEN_ALL_ED
Implemented All Fall with Harm Risk Interventions:  [place] to call system. Call bell, personal items and telephone within reach. Instruct patient to call for assistance. Room bathroom lighting operational. Non-slip footwear when patient is off stretcher. Physically safe environment: no spills, clutter or unnecessary equipment. Stretcher in lowest position, wheels locked, appropriate side rails in place. Provide visual cue, wrist band, yellow gown, etc. Monitor gait and stability. Monitor for mental status changes and reorient to person, place, and time. Review medications for side effects contributing to fall risk. Reinforce activity limits and safety measures with patient and family. Provide visual clues: red socks.

## 2019-11-21 ENCOUNTER — TRANSCRIPTION ENCOUNTER (OUTPATIENT)
Age: 84
End: 2019-11-21

## 2019-11-21 VITALS
DIASTOLIC BLOOD PRESSURE: 73 MMHG | SYSTOLIC BLOOD PRESSURE: 126 MMHG | RESPIRATION RATE: 18 BRPM | TEMPERATURE: 98 F | HEART RATE: 71 BPM

## 2019-11-21 LAB
ANION GAP SERPL CALC-SCNC: 7 MMOL/L — SIGNIFICANT CHANGE UP (ref 5–17)
BUN SERPL-MCNC: 27 MG/DL — HIGH (ref 7–23)
CALCIUM SERPL-MCNC: 7.9 MG/DL — LOW (ref 8.5–10.1)
CHLORIDE SERPL-SCNC: 115 MMOL/L — HIGH (ref 96–108)
CO2 SERPL-SCNC: 23 MMOL/L — SIGNIFICANT CHANGE UP (ref 22–31)
CREAT SERPL-MCNC: 1.4 MG/DL — HIGH (ref 0.5–1.3)
GLUCOSE SERPL-MCNC: 136 MG/DL — HIGH (ref 70–99)
HCT VFR BLD CALC: 33.1 % — LOW (ref 39–50)
HGB BLD-MCNC: 11.3 G/DL — LOW (ref 13–17)
MCHC RBC-ENTMCNC: 33.5 PG — SIGNIFICANT CHANGE UP (ref 27–34)
MCHC RBC-ENTMCNC: 34.1 GM/DL — SIGNIFICANT CHANGE UP (ref 32–36)
MCV RBC AUTO: 98.2 FL — SIGNIFICANT CHANGE UP (ref 80–100)
NRBC # BLD: 0 /100 WBCS — SIGNIFICANT CHANGE UP (ref 0–0)
PLATELET # BLD AUTO: 175 K/UL — SIGNIFICANT CHANGE UP (ref 150–400)
POTASSIUM SERPL-MCNC: 3.9 MMOL/L — SIGNIFICANT CHANGE UP (ref 3.5–5.3)
POTASSIUM SERPL-SCNC: 3.9 MMOL/L — SIGNIFICANT CHANGE UP (ref 3.5–5.3)
RAPID RVP RESULT: DETECTED
RBC # BLD: 3.37 M/UL — LOW (ref 4.2–5.8)
RBC # FLD: 12.8 % — SIGNIFICANT CHANGE UP (ref 10.3–14.5)
RV+EV RNA SPEC QL NAA+PROBE: DETECTED
SODIUM SERPL-SCNC: 145 MMOL/L — SIGNIFICANT CHANGE UP (ref 135–145)
WBC # BLD: 6.28 K/UL — SIGNIFICANT CHANGE UP (ref 3.8–10.5)
WBC # FLD AUTO: 6.28 K/UL — SIGNIFICANT CHANGE UP (ref 3.8–10.5)

## 2019-11-21 PROCEDURE — 87633 RESP VIRUS 12-25 TARGETS: CPT

## 2019-11-21 PROCEDURE — 99285 EMERGENCY DEPT VISIT HI MDM: CPT | Mod: 25

## 2019-11-21 PROCEDURE — 84145 PROCALCITONIN (PCT): CPT

## 2019-11-21 PROCEDURE — 71046 X-RAY EXAM CHEST 2 VIEWS: CPT

## 2019-11-21 PROCEDURE — 96374 THER/PROPH/DIAG INJ IV PUSH: CPT

## 2019-11-21 PROCEDURE — 83605 ASSAY OF LACTIC ACID: CPT

## 2019-11-21 PROCEDURE — 80048 BASIC METABOLIC PNL TOTAL CA: CPT

## 2019-11-21 PROCEDURE — 81003 URINALYSIS AUTO W/O SCOPE: CPT

## 2019-11-21 PROCEDURE — 93005 ELECTROCARDIOGRAM TRACING: CPT

## 2019-11-21 PROCEDURE — 36415 COLL VENOUS BLD VENIPUNCTURE: CPT

## 2019-11-21 PROCEDURE — 87798 DETECT AGENT NOS DNA AMP: CPT

## 2019-11-21 PROCEDURE — 87581 M.PNEUMON DNA AMP PROBE: CPT

## 2019-11-21 PROCEDURE — 94640 AIRWAY INHALATION TREATMENT: CPT

## 2019-11-21 PROCEDURE — 85027 COMPLETE CBC AUTOMATED: CPT

## 2019-11-21 PROCEDURE — 97162 PT EVAL MOD COMPLEX 30 MIN: CPT

## 2019-11-21 PROCEDURE — 87486 CHLMYD PNEUM DNA AMP PROBE: CPT

## 2019-11-21 PROCEDURE — 87040 BLOOD CULTURE FOR BACTERIA: CPT

## 2019-11-21 PROCEDURE — 99239 HOSP IP/OBS DSCHRG MGMT >30: CPT

## 2019-11-21 RX ORDER — LISINOPRIL 2.5 MG/1
1 TABLET ORAL
Qty: 0 | Refills: 0 | DISCHARGE

## 2019-11-21 RX ADMIN — Medication 20 MILLIGRAM(S): at 06:17

## 2019-11-21 RX ADMIN — Medication 12.5 MILLIGRAM(S): at 06:17

## 2019-11-21 RX ADMIN — MEMANTINE HYDROCHLORIDE 10 MILLIGRAM(S): 10 TABLET ORAL at 06:17

## 2019-11-21 RX ADMIN — Medication 81 MILLIGRAM(S): at 13:38

## 2019-11-21 RX ADMIN — APIXABAN 2.5 MILLIGRAM(S): 2.5 TABLET, FILM COATED ORAL at 07:21

## 2019-11-21 NOTE — PROGRESS NOTE ADULT - PROBLEM SELECTOR PLAN 1
Viral, + entero/rhinovirus  Patient with cough x3 days, with mild SOB during coughing fits  Symptomatic management  -S/p duoneb x1 and dexamethasone x1 in the ED  -Continue prednisone 20mg qd for 5 days, today day 2  -Unlikely pneumonia as CXR negative, no fevers, WBC WNL

## 2019-11-21 NOTE — PROGRESS NOTE ADULT - ASSESSMENT
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 Admitted for bronchitis and DILLON.

## 2019-11-21 NOTE — PHYSICAL THERAPY INITIAL EVALUATION ADULT - ACTIVE RANGE OF MOTION EXAMINATION, REHAB EVAL
RLE Active ROM was WNL (within normal limits)/gurjit. upper extremity Active ROM was WNL (within normal limits)

## 2019-11-21 NOTE — PHYSICAL THERAPY INITIAL EVALUATION ADULT - PERTINENT HX OF CURRENT PROBLEM, REHAB EVAL
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

## 2019-11-21 NOTE — PHYSICAL THERAPY INITIAL EVALUATION ADULT - PASSIVE RANGE OF MOTION EXAMINATION, REHAB EVAL
bilateral upper extremity Passive ROM was WNL (within normal limits)/Right LE Passive ROM was WNL (within normal limits)

## 2019-11-21 NOTE — PROGRESS NOTE ADULT - PROBLEM SELECTOR PLAN 6
-Chronic, controlled  -Continue toprol with hold parameters  -Hold home torsemide and lisinopril 2/2 DILLON

## 2019-11-21 NOTE — PHYSICAL THERAPY INITIAL EVALUATION ADULT - ADDITIONAL COMMENTS
Patient lives in private home with wife and 24 hour aide. There is a ramp to enter the home and the living quarters are on one main floor. Patient uses wheel chair to negotiate home and is mod Ax1 for functional transfers while wearing prosthetic device. Patient uses walker and prosthetic with Ax2 to ambulate throughout the week. Patient receives Ax1-2 for all ADLs. Patient lives in private home with wife and 24 hour aide. There is a ramp to enter the home and the living quarters are on main floor. Patient uses wheel chair to negotiate home and is A X 1 for functional transfers while wearing prosthetic device. As per wife, with prosthetic and assist x 2 patient practices ambulation 2-3x/wk.  Patient requires A x 1 for all ADLs; patient has walk in shower, fully handicapped accessible.

## 2019-11-21 NOTE — DISCHARGE NOTE PROVIDER - HOSPITAL COURSE
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 Patient was seen in UC today and advised to come to the ED for further evaluation. Per wife, patient would have coughing fits with subsequently difficulty breathing that resolves once he stops coughing. Patient denies any chest pain, nausea, vomiting, fever, chills, SOB, wheezing, sore throat.         Hospital course: Admitted for bronchitis/DILLON. Started on gentle IV hydration with short course of stress steroids. Other home medical regimen continued, nephrotoxic agents held temporarily given DILLON.  PT consulted for evaluation/ PT needs. Pt responded well to above treatment/interventions. Pt seen and examined day of discharge, medically optimized and stable for discharge home. 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 Patient was seen in UC today and advised to come to the ED for further evaluation. Per wife, patient would have coughing fits with subsequently difficulty breathing that resolves once he stops coughing. Patient denies any chest pain, nausea, vomiting, fever, chills, SOB, wheezing, sore throat.         Hospital course: Admitted for bronchitis/DILLON. Started on gentle IV hydration with short course of stress steroids. Other home medical regimen continued, nephrotoxic agents held temporarily given DILLON.  PT consulted for evaluation/ PT needs. Pt responded well to above treatment/interventions. Pt seen and examined day of discharge, medically optimized and stable for discharge home.        patient was seen and examined on day of dc and time spent on dc was more than     33 minutes

## 2019-11-21 NOTE — DISCHARGE NOTE PROVIDER - NSDCCPCAREPLAN_GEN_ALL_CORE_FT
PRINCIPAL DISCHARGE DIAGNOSIS  Diagnosis: Bronchitis  Assessment and Plan of Treatment: You have a viral bronchitis. Please continue to stay well hydrated and drink plenty of fluids.  Please continue to take prednisone 20mg for 3x more days after discharge  Please follow up with your PMD within 1 week of discharge      SECONDARY DISCHARGE DIAGNOSES  Diagnosis: Acute kidney injury  Assessment and Plan of Treatment: You had an acute injury to your kidneys due to reduced fluid/oral intake. Please continue to drink plenty of fluids after discharge.  Please hold your diuretic torsemide and ACEi lisinopril for now until you follow up with your PMD within 1 week to recheck your kidney function numbers.    Diagnosis: Hypertension  Assessment and Plan of Treatment: Continue home regimen expect for Torsemide and Lisinopril until you follow up with your PMD to recheck your kidney function    Diagnosis: Alzheimer disease  Assessment and Plan of Treatment: Continue home regimen    Diagnosis: History of DVT (deep vein thrombosis)  Assessment and Plan of Treatment: Continue eliquis    Diagnosis: AAA (abdominal aortic aneurysm)  Assessment and Plan of Treatment: Stable

## 2019-11-21 NOTE — PHYSICAL THERAPY INITIAL EVALUATION ADULT - LEVEL OF INDEPENDENCE: GAIT, REHAB EVAL
Due to prosthetic being left at home/unable to perform unable to perform/Prosthetic not available for use, left at home

## 2019-11-21 NOTE — PHYSICAL THERAPY INITIAL EVALUATION ADULT - LEVEL OF INDEPENDENCE: SIT/STAND, REHAB EVAL
moderate assist (50% patients effort)/Without use of prosthetic device, patient uses prosthetic device for functional transfers while home.

## 2019-11-21 NOTE — DISCHARGE NOTE PROVIDER - NSDCMRMEDTOKEN_GEN_ALL_CORE_FT
acetaminophen 325 mg oral tablet: 2 tab(s) orally every 6 hours, As needed, Mild Pain (1 - 3)  Aspir 81 oral delayed release tablet: 1 tab(s) orally once a day  atorvastatin 10 mg oral tablet: 1 tab(s) orally once a day  donepezil 10 mg oral tablet: 1 tab(s) orally 2 times a day  Eliquis 2.5 mg oral tablet: 1 tab(s) orally 2 times a day  Namenda 10 mg oral tablet: 1 tab(s) orally 2 times a day  Ocuvite oral tablet: 1 tab(s) orally once a day  predniSONE 20 mg oral tablet: 1 tab(s) orally once a day  Toprol-XL 25 mg oral tablet, extended release: 0.5 tab(s) orally once a day  Vitamin D3: 1 tablet orally once a day.    *Dosing hx n/a*

## 2019-11-21 NOTE — DISCHARGE NOTE NURSING/CASE MANAGEMENT/SOCIAL WORK - PATIENT PORTAL LINK FT
You can access the FollowMyHealth Patient Portal offered by [facility] by registering at the following website: [email or web address]. By joining "[email or web address]’s FollowMyHealth portal, you will also be able to view your health information using other applications (apps) compatible with our system.

## 2019-11-21 NOTE — PROGRESS NOTE ADULT - PROBLEM SELECTOR PLAN 2
Likely Prerenal likely 2/2 dehydration as patient with recent poor PO intake and on diuretic, improving today, cr trending down  -continue to hold Torsemide, hold Lisinopril for now  -baseline Cr 0.8-1.0, today 1.4  -continue gentle hydration, encourage PO intake   -trend bmp

## 2019-11-21 NOTE — PROGRESS NOTE ADULT - SUBJECTIVE AND OBJECTIVE BOX
Patient is a 89y old  Male who presents with a chief complaint of bronchitis, dehydration (2019 17:23)      INTERVAL HPI/OVERNIGHT EVENTS: Pt seen and examined with wife at bedside. Reports doing better, still having some cough/mild generalized weakness. Denies cp, sob, dizziness, abdominal pain.    MEDICATIONS  (STANDING):  apixaban 2.5 milliGRAM(s) Oral every 12 hours  aspirin enteric coated 81 milliGRAM(s) Oral daily  atorvastatin 10 milliGRAM(s) Oral at bedtime  donepezil 10 milliGRAM(s) Oral at bedtime  memantine 10 milliGRAM(s) Oral two times a day  metoprolol succinate ER 12.5 milliGRAM(s) Oral daily  predniSONE   Tablet 20 milliGRAM(s) Oral daily  sodium chloride 0.9%. 1000 milliLiter(s) (50 mL/Hr) IV Continuous <Continuous>    MEDICATIONS  (PRN):  ALBUTerol    0.083% 2.5 milliGRAM(s) Nebulizer every 6 hours PRN Shortness of Breath and/or Wheezing      Allergies    No Known Allergies    Intolerances        REVIEW OF SYSTEMS:  CONSTITUTIONAL: + weakness, No fever or chills  HEENT:  No headache, no sore throat  RESPIRATORY: + cough, no wheezing, no shortness of breath  CARDIOVASCULAR: No chest pain, palpitations, or leg swelling  GASTROINTESTINAL: No nausea, vomiting, or diarrhea  GENITOURINARY: No dysuria, frequency, or hematuria  NEUROLOGICAL: no focal weakness or dizziness  SKIN:  No rashes or lesions   MUSCULOSKELETAL: no myalgias   PSYCHIATRIC: No depression or anxiety  ROS Unable to obtain due to - [ ] Dementia  [ ] Lethargy  REST OF REVIEW Of SYSTEM - [x ] Normal     Vital Signs Last 24 Hrs  T(C): 36.4 (2019 06:05), Max: 37 (2019 18:00)  T(F): 97.5 (2019 06:05), Max: 98.6 (2019 18:00)  HR: 85 (2019 06:05) (55 - 89)  BP: 110/62 (2019 06:05) (110/62 - 137/81)  BP(mean): --  RR: 17 (2019 06:05) (16 - 18)  SpO2: 93% (2019 06:05) (93% - 98%)    PHYSICAL EXAM:  GENERAL: NAD, well nourished  HEENT:  EOMI, moist mucous membranes  CHEST/LUNG: grossly = CTA b/l, no rales, wheezes, or rhonchi  HEART:  RRR, S1, S2, no murmur  ABDOMEN:  BS+, soft, nontender, nondistended  EXTREMITIES: no edema or calf tenderness  SKIN:  no rash  NERVOUS SYSTEM: AA&Ox3    LABS:                        11.3   6.28  )-----------( 175      ( 2019 08:00 )             33.1     CBC Full  -  ( 2019 08:00 )  WBC Count : 6.28 K/uL  Hemoglobin : 11.3 g/dL  Hematocrit : 33.1 %  Platelet Count - Automated : 175 K/uL  Mean Cell Volume : 98.2 fl  Mean Cell Hemoglobin : 33.5 pg  Mean Cell Hemoglobin Concentration : 34.1 gm/dL  Auto Neutrophil # : x  Auto Lymphocyte # : x  Auto Monocyte # : x  Auto Eosinophil # : x  Auto Basophil # : x  Auto Neutrophil % : x  Auto Lymphocyte % : x  Auto Monocyte % : x  Auto Eosinophil % : x  Auto Basophil % : x    2019 08:01    145    |  115    |  27     ----------------------------<  136    3.9     |  23     |  1.40     Ca    7.9        2019 08:01        Urinalysis Basic - ( 2019 17:24 )    Color: Yellow / Appearance: Clear / S.015 / pH: x  Gluc: x / Ketone: Negative  / Bili: Negative / Urobili: Negative   Blood: x / Protein: Negative / Nitrite: Negative   Leuk Esterase: Negative / RBC: x / WBC x   Sq Epi: x / Non Sq Epi: x / Bacteria: x      CAPILLARY BLOOD GLUCOSE              RADIOLOGY & ADDITIONAL TESTS:    Personally reviewed.     Consultant(s) Notes Reviewed:  [x] YES  [ ] NO    Care Discussed with [x] Consultants  [x] Patient  [x ] Family  [ ]      [x ] Other; RN  DVT ppx

## 2020-08-04 NOTE — ED ADULT NURSE NOTE - FALL HARM RISK
Anesthesia Evaluation     Patient summary reviewed and Nursing notes reviewed   NPO Solid Status: > 8 hours  NPO Liquid Status: > 2 hours           Airway   Mallampati: II  TM distance: >3 FB  Neck ROM: full  No difficulty expected  Dental      Pulmonary    (+) sleep apnea on CPAP,   (-) COPD, asthma (mild allergic asthma; PRN MDI Not used for years), shortness of breath, recent URI  Cardiovascular     Patient on routine beta blocker    (+) hypertension, hyperlipidemia,   (-) past MI, dysrhythmias, angina      Neuro/Psych  (+) headaches, psychiatric history (xanax),     (-) seizures, TIA, CVA  GI/Hepatic/Renal/Endo    (+) morbid obesity,    (-)  obesity, liver disease, no renal disease, diabetes, no thyroid disorder    Musculoskeletal     Abdominal    Substance History      OB/GYN          Other   arthritis,          Phys Exam Other: IIB view with Mac 4  Upper front caps               Anesthesia Plan    ASA 3     spinal and MAC     intravenous induction     Anesthetic plan, all risks, benefits, and alternatives have been provided, discussed and informed consent has been obtained with: patient.    Plan discussed with CRNA.       other

## 2022-10-26 NOTE — DISCHARGE NOTE ADULT - MEDICATION SUMMARY - MEDICATIONS TO STOP TAKING
I will STOP taking the medications listed below when I get home from the hospital:  None Price (Do Not Change): 0.00 Instructions: This plan will send the code [id number] to the PM system.  DO NOT or CHANGE the price. Detail Level: Simple

## 2023-05-15 NOTE — ED PROVIDER NOTE - CPE EDP RESP NORM
Case Management Assessment            Discharge Note                    Date / Time of Note: 5/15/2023 3:13 PM                  Discharge Note Completed by: Robert Baker RN    Patient Name: Luba Galdamez   YOB: 1962  Diagnosis: Seizure-like activity ([facility] [address] [R56.9]  Breakthrough seizure ([facility] [address] Shelley Celaya   Date / Time: 5/14/2023 10:57 AM    Current PCP: Mallika Schuler patient: No    Hospitalization in the last 30 days: No       Advance Directives:  Code Status: Full Code  [place] DNR form completed and on chart: Not Indicated    Financial:  Payor: MEDICARE / Plan: MEDICARE PART A AND B / Product Type: *No Product type* /      Pharmacy:    [address] [phone]  [address] [place] [address] Ave  Phone: [phone] Fax: [phone] [facility] [address] [place] [address]  Phone: [phone] Fax: [phone]      Assistance purchasing medications?: Potential Assistance Purchasing Medications: No  Assistance provided by Case Management: None at this time    Does patient want to participate in local refill/ meds to beds program?:      Meds To Beds General Rules:  1. Can ONLY be done Monday- Friday between 8:30am-5pm  2. Prescription(s) must be in pharmacy by 3pm to be filled same day  3. Copy of patient's insurance/ prescription drug card and patient face sheet must be sent along with the prescription(s)  4. Cost of Rx cannot be added to hospital bill. If financial assistance is needed, please contact unit  or ;  or  CANNOT provide pharmacy voucher for patients co-pays  5.  Patients can then  the prescription on their way out of the hospital at discharge, or pharmacy can deliver to the bedside if staff is available. (payment due at time of pick-up or delivery
normal...

## 2024-08-08 NOTE — ED ADULT NURSE NOTE - NS PRO AD PATIENT TYPE
----- Message from Airam Cox DO sent at 8/8/2024  3:56 PM CDT -----  Regarding: RE: UTI  Treat with Macrobid 100 mg twice daily for 7 days. Given recent treatment with ciprofloxacin, not appropriate to repeat, as this may foster antibiotic resistance.     Health Care Proxy (HCP)
